# Patient Record
Sex: MALE | Race: BLACK OR AFRICAN AMERICAN | NOT HISPANIC OR LATINO | Employment: OTHER | URBAN - METROPOLITAN AREA
[De-identification: names, ages, dates, MRNs, and addresses within clinical notes are randomized per-mention and may not be internally consistent; named-entity substitution may affect disease eponyms.]

---

## 2020-12-10 DIAGNOSIS — R22.1 LOCALIZED SWELLING, MASS AND LUMP, NECK: Primary | ICD-10-CM

## 2020-12-10 DIAGNOSIS — R59.9 ENLARGED LYMPH NODES, UNSPECIFIED: ICD-10-CM

## 2021-12-21 ENCOUNTER — HOSPITAL ENCOUNTER (OUTPATIENT)
Dept: RADIOLOGY | Facility: OTHER | Age: 81
Discharge: HOME | End: 2021-12-21
Attending: FAMILY MEDICINE
Payer: MEDICARE

## 2021-12-21 DIAGNOSIS — G44.52 NEW DAILY PERSISTENT HEADACHE: Primary | ICD-10-CM

## 2021-12-21 DIAGNOSIS — G44.52 NEW DAILY PERSISTENT HEADACHE: ICD-10-CM

## 2021-12-21 PROCEDURE — 70450 CT HEAD/BRAIN W/O DYE: CPT | Mod: 26,,, | Performed by: RADIOLOGY

## 2021-12-21 PROCEDURE — 70450 CT HEAD WITHOUT CONTRAST: ICD-10-PCS | Mod: 26,,, | Performed by: RADIOLOGY

## 2021-12-21 PROCEDURE — 70450 CT HEAD/BRAIN W/O DYE: CPT | Mod: TC

## 2022-09-15 ENCOUNTER — HOSPITAL ENCOUNTER (EMERGENCY)
Facility: HOSPITAL | Age: 82
Discharge: HOME | End: 2022-09-15
Attending: EMERGENCY MEDICINE
Payer: MEDICARE

## 2022-09-15 VITALS
HEIGHT: 66 IN | OXYGEN SATURATION: 98 % | RESPIRATION RATE: 18 BRPM | DIASTOLIC BLOOD PRESSURE: 71 MMHG | SYSTOLIC BLOOD PRESSURE: 139 MMHG | HEART RATE: 84 BPM | WEIGHT: 196 LBS | BODY MASS INDEX: 31.5 KG/M2 | TEMPERATURE: 98 F

## 2022-09-15 DIAGNOSIS — R33.9 URINARY RETENTION: Primary | ICD-10-CM

## 2022-09-15 LAB
BILIRUB UR QL STRIP: NEGATIVE
CLARITY UR: CLEAR
COLOR UR: YELLOW
GLUCOSE UR QL STRIP: NEGATIVE
HGB UR QL STRIP: NEGATIVE
KETONES UR QL STRIP: NEGATIVE
LEUKOCYTE ESTERASE UR QL STRIP: NEGATIVE
NITRITE UR QL STRIP: NEGATIVE
PH UR STRIP: 6 [PH] (ref 5–8)
POCT GLUCOSE: 181 MG/DL (ref 70–110)
PROT UR QL STRIP: NEGATIVE
SP GR UR STRIP: 1.01 (ref 1–1.03)
URN SPEC COLLECT METH UR: NORMAL
UROBILINOGEN UR STRIP-ACNC: NEGATIVE EU/DL

## 2022-09-15 PROCEDURE — 99283 EMERGENCY DEPT VISIT LOW MDM: CPT | Mod: 25

## 2022-09-15 PROCEDURE — 81003 URINALYSIS AUTO W/O SCOPE: CPT | Performed by: EMERGENCY MEDICINE

## 2022-09-15 PROCEDURE — 82962 GLUCOSE BLOOD TEST: CPT

## 2022-09-15 PROCEDURE — 51702 INSERT TEMP BLADDER CATH: CPT

## 2022-09-15 RX ORDER — TAMSULOSIN HYDROCHLORIDE 0.4 MG/1
0.4 CAPSULE ORAL DAILY
Qty: 14 CAPSULE | Refills: 0 | Status: SHIPPED | OUTPATIENT
Start: 2022-09-15 | End: 2022-09-26 | Stop reason: SDUPTHER

## 2022-09-16 NOTE — ED NOTES
Pt discharged at this time after switching catheter to leg bag. Pt provided adequate return demonstration on emptying leg bag and placement. Pt discharged with son with follow-up referral to urology. Pt and son verbalized understanding.

## 2022-09-16 NOTE — ED PROVIDER NOTES
Encounter Date: 9/15/2022       History     Chief Complaint   Patient presents with    Urinary Frequency     Since last night started new medications on the 9th      82-year-old male history of hypertension diabetes presents with urinary frequency since yesterday.  He was recently admitted at [facility] for possible TIA.  Family reports history of at least 1 prior UTI.  Patient states about every 20 minutes he gets the sensation to urinate feels like he is not completely emptying his bladder.  He denies dysuria or abdominal pain.  Denies fevers chills nausea and vomiting.  To his knowledge he does not have any prostate issues.    The history is provided by the patient.   Review of patient's allergies indicates:  No Known Allergies  No past medical history on file.  No past surgical history on file.  No family history on file.     Review of Systems   Constitutional: Negative.    Gastrointestinal: Negative.    Genitourinary:  Positive for decreased urine volume, frequency and urgency.     Physical Exam     Initial Vitals [09/15/22 2116]   BP Pulse Resp Temp SpO2   139/71 84 18 97.7 °F (36.5 °C) 98 %      MAP       --         Physical Exam    Nursing note and vitals reviewed.  Constitutional: He appears well-developed and well-nourished. He is not diaphoretic.  Non-toxic appearance. He does not have a sickly appearance. He does not appear ill. No distress.   HENT:   Head: Normocephalic and atraumatic.   Eyes: EOM are normal.   Neck: Neck supple.   Normal range of motion.  Cardiovascular:  Normal rate, regular rhythm and normal heart sounds.     Exam reveals no gallop and no friction rub.       No murmur heard.  Pulmonary/Chest: Breath sounds normal. No respiratory distress. He has no wheezes. He has no rhonchi. He has no rales.   Abdominal: He exhibits distension.   Distended bladder   Musculoskeletal:         General: Normal range of motion.      Cervical back: Normal range of motion and neck supple. No rigidity.  Normal range of motion.     Neurological: He is alert and oriented to person, place, and time.   Skin: Skin is warm and dry. No rash noted.   Psychiatric: He has a normal mood and affect. His behavior is normal. Judgment and thought content normal.       ED Course   Procedures  Labs Reviewed   POCT GLUCOSE - Abnormal; Notable for the following components:       Result Value    POCT Glucose 181 (*)     All other components within normal limits   URINALYSIS, REFLEX TO URINE CULTURE    Narrative:     Specimen Source->Urine          Imaging Results    None          Medications - No data to display  Medical Decision Making:   History:   I obtained history from: someone other than patient.       <> Summary of History: Son-in-law  Old Medical Records: I decided to obtain old medical records.  Clinical Tests:   Lab Tests: Ordered and Reviewed             ED Course as of 09/15/22 2313   u Sep 15, 2022   2143 POCT Glucose(!): 181 [EF]   2143 BP: 139/71 [EF]   2143 Temp: 97.7 °F (36.5 °C) [EF]   2143 Temp src: Oral [EF]   2143 Pulse: 84 [EF]   2143 Resp: 18 [EF]   2143 SpO2: 98 % [EF]   2214 Leukocytes, UA: Negative [EF]   2214 NITRITE UA: Negative [EF]      ED Course User Index  [EF] Eric Ybarra MD                 Clinical Impression:   Final diagnoses:  [R33.9] Urinary retention (Primary)      ED Disposition Condition    Discharge Stable          ED Prescriptions       Medication Sig Dispense Start Date End Date Auth. Provider    tamsulosin (FLOMAX) 0.4 mg Cap Take 1 capsule (0.4 mg total) by mouth once daily. for 14 days 14 capsule 9/15/2022 9/29/2022 Eric Ybarra MD          Follow-up Information       Follow up With Specialties Details Why Contact Info    Victor M Campuzano MD Urology Schedule an appointment as soon as possible for a visit   [address] [place] [address] [address]  [place] [address]  [phone]      [facility] Emergency Dept Emergency Medicine  As needed, If symptoms worsen [address] [place] [address]  Drive  [facility] [address] [phone]            82-year-old male presents with urinary frequency hesitancy and a sensation of incomplete emptying.  Bladder scan demonstrates almost 1 L of urine has been retained.  Urinalysis is clear with no sign of infection.  Archer catheter will remain in place until Urology follow-up.  Patient will be started on Flomax.     Eric Ybarra MD  09/15/22 1814

## 2022-09-19 ENCOUNTER — OFFICE VISIT (OUTPATIENT)
Dept: FAMILY MEDICINE | Facility: CLINIC | Age: 82
End: 2022-09-19
Payer: MEDICARE

## 2022-09-19 VITALS
WEIGHT: 196.19 LBS | HEIGHT: 66 IN | BODY MASS INDEX: 31.53 KG/M2 | HEART RATE: 91 BPM | SYSTOLIC BLOOD PRESSURE: 98 MMHG | OXYGEN SATURATION: 98 % | TEMPERATURE: 99 F | DIASTOLIC BLOOD PRESSURE: 56 MMHG

## 2022-09-19 DIAGNOSIS — I95.9 HYPOTENSIVE EPISODE: ICD-10-CM

## 2022-09-19 DIAGNOSIS — R33.9 URINARY RETENTION: Primary | ICD-10-CM

## 2022-09-19 PROCEDURE — 99214 OFFICE O/P EST MOD 30 MIN: CPT | Mod: S$GLB,,, | Performed by: NURSE PRACTITIONER

## 2022-09-19 PROCEDURE — 99999 PR PBB SHADOW E&M-EST. PATIENT-LVL III: CPT | Mod: PBBFAC,,, | Performed by: NURSE PRACTITIONER

## 2022-09-19 PROCEDURE — 3074F SYST BP LT 130 MM HG: CPT | Mod: CPTII,S$GLB,, | Performed by: NURSE PRACTITIONER

## 2022-09-19 PROCEDURE — 1159F MED LIST DOCD IN RCRD: CPT | Mod: CPTII,S$GLB,, | Performed by: NURSE PRACTITIONER

## 2022-09-19 PROCEDURE — 99214 PR OFFICE/OUTPT VISIT, EST, LEVL IV, 30-39 MIN: ICD-10-PCS | Mod: S$GLB,,, | Performed by: NURSE PRACTITIONER

## 2022-09-19 PROCEDURE — 3074F PR MOST RECENT SYSTOLIC BLOOD PRESSURE < 130 MM HG: ICD-10-PCS | Mod: CPTII,S$GLB,, | Performed by: NURSE PRACTITIONER

## 2022-09-19 PROCEDURE — 3078F PR MOST RECENT DIASTOLIC BLOOD PRESSURE < 80 MM HG: ICD-10-PCS | Mod: CPTII,S$GLB,, | Performed by: NURSE PRACTITIONER

## 2022-09-19 PROCEDURE — 1126F PR PAIN SEVERITY QUANTIFIED, NO PAIN PRESENT: ICD-10-PCS | Mod: CPTII,S$GLB,, | Performed by: NURSE PRACTITIONER

## 2022-09-19 PROCEDURE — 3288F PR FALLS RISK ASSESSMENT DOCUMENTED: ICD-10-PCS | Mod: CPTII,S$GLB,, | Performed by: NURSE PRACTITIONER

## 2022-09-19 PROCEDURE — 3288F FALL RISK ASSESSMENT DOCD: CPT | Mod: CPTII,S$GLB,, | Performed by: NURSE PRACTITIONER

## 2022-09-19 PROCEDURE — 3078F DIAST BP <80 MM HG: CPT | Mod: CPTII,S$GLB,, | Performed by: NURSE PRACTITIONER

## 2022-09-19 PROCEDURE — 1126F AMNT PAIN NOTED NONE PRSNT: CPT | Mod: CPTII,S$GLB,, | Performed by: NURSE PRACTITIONER

## 2022-09-19 PROCEDURE — 1101F PR PT FALLS ASSESS DOC 0-1 FALLS W/OUT INJ PAST YR: ICD-10-PCS | Mod: CPTII,S$GLB,, | Performed by: NURSE PRACTITIONER

## 2022-09-19 PROCEDURE — 1159F PR MEDICATION LIST DOCUMENTED IN MEDICAL RECORD: ICD-10-PCS | Mod: CPTII,S$GLB,, | Performed by: NURSE PRACTITIONER

## 2022-09-19 PROCEDURE — 1101F PT FALLS ASSESS-DOCD LE1/YR: CPT | Mod: CPTII,S$GLB,, | Performed by: NURSE PRACTITIONER

## 2022-09-19 PROCEDURE — 99999 PR PBB SHADOW E&M-EST. PATIENT-LVL III: ICD-10-PCS | Mod: PBBFAC,,, | Performed by: NURSE PRACTITIONER

## 2022-09-19 RX ORDER — TIZANIDINE 4 MG/1
4 TABLET ORAL 2 TIMES DAILY PRN
COMMUNITY
Start: 2022-09-16 | End: 2022-09-28 | Stop reason: ALTCHOICE

## 2022-09-19 RX ORDER — LISINOPRIL 20 MG/1
20 TABLET ORAL DAILY
Qty: 90 TABLET | Refills: 3 | Status: SHIPPED | OUTPATIENT
Start: 2022-09-19 | End: 2022-09-28 | Stop reason: ALTCHOICE

## 2022-09-19 RX ORDER — AMLODIPINE BESYLATE 10 MG/1
10 TABLET ORAL DAILY
COMMUNITY
Start: 2022-06-07 | End: 2022-09-28 | Stop reason: ALTCHOICE

## 2022-09-19 RX ORDER — BRIMONIDINE TARTRATE 1.5 MG/ML
1 SOLUTION/ DROPS OPHTHALMIC 3 TIMES DAILY
COMMUNITY
Start: 2022-06-08 | End: 2023-03-21 | Stop reason: SDUPTHER

## 2022-09-19 RX ORDER — METFORMIN HYDROCHLORIDE 1000 MG/1
2000 TABLET ORAL EVERY MORNING
COMMUNITY
Start: 2022-09-08 | End: 2023-01-26 | Stop reason: SDUPTHER

## 2022-09-19 RX ORDER — ATORVASTATIN CALCIUM 80 MG/1
80 TABLET, FILM COATED ORAL DAILY
COMMUNITY
Start: 2022-09-08 | End: 2022-10-25 | Stop reason: SDUPTHER

## 2022-09-19 RX ORDER — INSULIN DETEMIR 100 [IU]/ML
INJECTION, SOLUTION SUBCUTANEOUS
COMMUNITY
Start: 2022-07-12 | End: 2022-09-28 | Stop reason: SDUPTHER

## 2022-09-19 RX ORDER — PEN NEEDLE, DIABETIC 31 GX5/16"
NEEDLE, DISPOSABLE MISCELLANEOUS
COMMUNITY
Start: 2022-06-07 | End: 2023-10-11 | Stop reason: SDUPTHER

## 2022-09-19 RX ORDER — CALCIUM CITRATE/VITAMIN D3 200MG-6.25
TABLET ORAL 3 TIMES DAILY
COMMUNITY
Start: 2022-04-09 | End: 2023-06-16 | Stop reason: SDUPTHER

## 2022-09-19 RX ORDER — INSULIN ASPART 100 [IU]/ML
INJECTION, SOLUTION INTRAVENOUS; SUBCUTANEOUS
COMMUNITY
Start: 2022-09-16 | End: 2023-05-01 | Stop reason: SDUPTHER

## 2022-09-19 RX ORDER — CLOPIDOGREL BISULFATE 75 MG/1
75 TABLET ORAL DAILY
COMMUNITY
Start: 2022-09-08 | End: 2022-10-25 | Stop reason: SDUPTHER

## 2022-09-19 RX ORDER — BRIMONIDINE TARTRATE 2 MG/ML
1 SOLUTION/ DROPS OPHTHALMIC 2 TIMES DAILY
COMMUNITY
Start: 2022-04-05 | End: 2023-03-21 | Stop reason: SDUPTHER

## 2022-09-19 RX ORDER — TAMSULOSIN HYDROCHLORIDE 0.4 MG/1
0.4 CAPSULE ORAL
COMMUNITY
End: 2022-09-19 | Stop reason: SDUPTHER

## 2022-09-19 RX ORDER — LISINOPRIL 40 MG/1
40 TABLET ORAL DAILY
COMMUNITY
Start: 2022-09-16 | End: 2022-09-19 | Stop reason: DRUGHIGH

## 2022-09-19 RX ORDER — ASPIRIN 81 MG/1
81 TABLET ORAL DAILY
COMMUNITY

## 2022-09-19 RX ORDER — DORZOLAMIDE HYDROCHLORIDE AND TIMOLOL MALEATE 20; 5 MG/ML; MG/ML
1 SOLUTION/ DROPS OPHTHALMIC 2 TIMES DAILY
COMMUNITY
Start: 2022-06-07 | End: 2023-03-21 | Stop reason: SDUPTHER

## 2022-09-19 RX ORDER — GABAPENTIN 300 MG/1
300 CAPSULE ORAL NIGHTLY
COMMUNITY
Start: 2022-09-16

## 2022-09-19 RX ORDER — TRAVOPROST OPHTHALMIC SOLUTION 0.04 MG/ML
1 SOLUTION OPHTHALMIC NIGHTLY
COMMUNITY
Start: 2022-06-07 | End: 2023-03-21 | Stop reason: SDUPTHER

## 2022-09-25 NOTE — PROGRESS NOTES
[facility] Note  Staff: KIRSTEN Silver    PCP: CORINNE Still    Chief Complaint: ER F/UP-Urinary Retention    Subjective:        HPI: Evelio Pandya Sr. is a 82 y.o. male presents today for ER F/UP-NEW PATIENT.    ER Visit on 9/15/22:  History of present illness  This 82 y.o. presents today for complaint of follow-up from emergency department.  Patient had urinary retention and has an indwelling catheter.    Emergency room had recommended follow-up with Urology.  He is taking the Flomax as directed.  Denies any fever chills.  No blood in urine.  Patient denies any back pain.  ER removed almost one liter of urine from bladder.    He was recently admitted at [facility] for possible TIA.  Family reports history of at least 1 prior UTI.  Patient states about every 20 minutes he gets the sensation to urinate feels like he is not completely emptying his bladder.  He denies dysuria or abdominal pain.  Denies fevers chills nausea and vomiting.  To his knowledge he does not have any prostate issues.    Yesterday:  Pt went to ER due to blood in his urine with avila catheter.  Therefore ER removed catheter and prescribed pt antibiotics.    TODAY:  Pt was unable to urinate, wearing depends 24/7.  PVR scan of >229 mL at this time.  Pt states he is leaking all day long, unable to hold his urine.  Catheter is not in place, was removed yesterday in ER.  Pt is currently taking Flomax 0.4 mg daily from initial ER visit.     REVIEW OF SYSTEMS:  A comprehensive 10 system review was performed and is negative except as noted above in HPI    PMHx:  Past Medical History:   Diagnosis Date    Diabetes mellitus     Hypertension     TIA (transient ischemic attack)      PSHx:  History reviewed. No pertinent surgical history.    Allergies:  Patient has no known allergies.    Medications: reviewed     Objective:     Vitals:    09/26/22 0813   BP: 125/70   Pulse: 83     General:WDWN in NAD  Eyes: PERRLA, normal  conjunctiva  Respiratory: no increased work on breathing, clear to auscultation  Cardiovascular: regular rate and rhythm. No obvious extremity edema.  GI: palpation of masses. No tenderness. No hepatosplenomegaly to palpation.  Musculoskeletal: normal range of motion of bilateral upper extremities. Normal muscle strength and tone.  Skin: no obvious rashes or lesions. No tightening of skin noted.  Neurologic: CN grossly normal. Normal sensation.   Psychiatric: awake, alert and oriented x 3. Mood and affect normal. Cooperative.     Assessment:       1. Benign prostatic hyperplasia with incomplete bladder emptying    2. Urinary retention          Plan:   BPH, hx recent TIA--Urinary Retention:  Pt was encouraged to drink plenty of fluids throughout the day today.  As well as bladder training every 2 hrs today in order to attempt to emptying bladder.    2.  Pt should increase Tamsulosin 0.4 to 0.8 mg daily at this time.    F/u with me in am to give UA and PVR recheck.  Pt and family verbalized understanding at this time.    Natali East, MARYP-C

## 2022-09-26 ENCOUNTER — HOSPITAL ENCOUNTER (EMERGENCY)
Facility: HOSPITAL | Age: 82
Discharge: HOME | End: 2022-09-26
Attending: EMERGENCY MEDICINE
Payer: MEDICARE

## 2022-09-26 ENCOUNTER — OFFICE VISIT (OUTPATIENT)
Dept: UROLOGY | Facility: CLINIC | Age: 82
End: 2022-09-26
Payer: MEDICARE

## 2022-09-26 VITALS
WEIGHT: 198 LBS | DIASTOLIC BLOOD PRESSURE: 70 MMHG | BODY MASS INDEX: 31.82 KG/M2 | HEART RATE: 83 BPM | SYSTOLIC BLOOD PRESSURE: 125 MMHG | HEIGHT: 66 IN

## 2022-09-26 VITALS
HEART RATE: 89 BPM | HEIGHT: 66 IN | WEIGHT: 198 LBS | BODY MASS INDEX: 31.82 KG/M2 | RESPIRATION RATE: 18 BRPM | SYSTOLIC BLOOD PRESSURE: 125 MMHG | TEMPERATURE: 99 F | DIASTOLIC BLOOD PRESSURE: 86 MMHG | OXYGEN SATURATION: 99 %

## 2022-09-26 DIAGNOSIS — R33.9 URINARY RETENTION: ICD-10-CM

## 2022-09-26 DIAGNOSIS — R39.14 BENIGN PROSTATIC HYPERPLASIA WITH INCOMPLETE BLADDER EMPTYING: Primary | ICD-10-CM

## 2022-09-26 DIAGNOSIS — R31.0 GROSS HEMATURIA: ICD-10-CM

## 2022-09-26 DIAGNOSIS — N40.1 BENIGN PROSTATIC HYPERPLASIA WITH INCOMPLETE BLADDER EMPTYING: Primary | ICD-10-CM

## 2022-09-26 DIAGNOSIS — T83.511A URINARY TRACT INFECTION ASSOCIATED WITH INDWELLING URETHRAL CATHETER, INITIAL ENCOUNTER: Primary | ICD-10-CM

## 2022-09-26 DIAGNOSIS — N39.0 URINARY TRACT INFECTION ASSOCIATED WITH INDWELLING URETHRAL CATHETER, INITIAL ENCOUNTER: Primary | ICD-10-CM

## 2022-09-26 LAB
BACTERIA #/AREA URNS HPF: ABNORMAL /HPF
BILIRUB UR QL STRIP: ABNORMAL
CLARITY UR: ABNORMAL
COLOR UR: ABNORMAL
GLUCOSE UR QL STRIP: ABNORMAL
HCV AB SERPL QL IA: NORMAL
HGB UR QL STRIP: ABNORMAL
HIV 1+2 AB+HIV1 P24 AG SERPL QL IA: NORMAL
HYALINE CASTS #/AREA URNS LPF: 0 /LPF
KETONES UR QL STRIP: ABNORMAL
LEUKOCYTE ESTERASE UR QL STRIP: ABNORMAL
MICROSCOPIC COMMENT: ABNORMAL
NITRITE UR QL STRIP: POSITIVE
PH UR STRIP: 6 [PH] (ref 5–8)
POC RESIDUAL URINE VOLUME: 229 ML (ref 0–100)
PROT UR QL STRIP: ABNORMAL
RBC #/AREA URNS HPF: >100 /HPF (ref 0–4)
SP GR UR STRIP: 1.02 (ref 1–1.03)
URN SPEC COLLECT METH UR: ABNORMAL
UROBILINOGEN UR STRIP-ACNC: ABNORMAL EU/DL
WBC #/AREA URNS HPF: 15 /HPF (ref 0–5)

## 2022-09-26 PROCEDURE — 86803 HEPATITIS C AB TEST: CPT | Performed by: EMERGENCY MEDICINE

## 2022-09-26 PROCEDURE — 99214 OFFICE O/P EST MOD 30 MIN: CPT | Mod: S$GLB,,, | Performed by: NURSE PRACTITIONER

## 2022-09-26 PROCEDURE — 1160F PR REVIEW ALL MEDS BY PRESCRIBER/CLIN PHARMACIST DOCUMENTED: ICD-10-PCS | Mod: CPTII,S$GLB,, | Performed by: NURSE PRACTITIONER

## 2022-09-26 PROCEDURE — 99999 PR PBB SHADOW E&M-EST. PATIENT-LVL V: ICD-10-PCS | Mod: PBBFAC,,, | Performed by: NURSE PRACTITIONER

## 2022-09-26 PROCEDURE — 63600175 PHARM REV CODE 636 W HCPCS: Performed by: EMERGENCY MEDICINE

## 2022-09-26 PROCEDURE — 51798 US URINE CAPACITY MEASURE: CPT | Mod: S$GLB,,, | Performed by: NURSE PRACTITIONER

## 2022-09-26 PROCEDURE — 36415 COLL VENOUS BLD VENIPUNCTURE: CPT | Performed by: EMERGENCY MEDICINE

## 2022-09-26 PROCEDURE — 87389 HIV-1 AG W/HIV-1&-2 AB AG IA: CPT | Performed by: EMERGENCY MEDICINE

## 2022-09-26 PROCEDURE — 51798 POCT BLADDER SCAN: ICD-10-PCS | Mod: S$GLB,,, | Performed by: NURSE PRACTITIONER

## 2022-09-26 PROCEDURE — 3074F PR MOST RECENT SYSTOLIC BLOOD PRESSURE < 130 MM HG: ICD-10-PCS | Mod: CPTII,S$GLB,, | Performed by: NURSE PRACTITIONER

## 2022-09-26 PROCEDURE — 96372 THER/PROPH/DIAG INJ SC/IM: CPT | Performed by: EMERGENCY MEDICINE

## 2022-09-26 PROCEDURE — 87077 CULTURE AEROBIC IDENTIFY: CPT | Performed by: EMERGENCY MEDICINE

## 2022-09-26 PROCEDURE — 87086 URINE CULTURE/COLONY COUNT: CPT | Performed by: EMERGENCY MEDICINE

## 2022-09-26 PROCEDURE — 3074F SYST BP LT 130 MM HG: CPT | Mod: CPTII,S$GLB,, | Performed by: NURSE PRACTITIONER

## 2022-09-26 PROCEDURE — 3078F PR MOST RECENT DIASTOLIC BLOOD PRESSURE < 80 MM HG: ICD-10-PCS | Mod: CPTII,S$GLB,, | Performed by: NURSE PRACTITIONER

## 2022-09-26 PROCEDURE — 3288F FALL RISK ASSESSMENT DOCD: CPT | Mod: CPTII,S$GLB,, | Performed by: NURSE PRACTITIONER

## 2022-09-26 PROCEDURE — 3288F PR FALLS RISK ASSESSMENT DOCUMENTED: ICD-10-PCS | Mod: CPTII,S$GLB,, | Performed by: NURSE PRACTITIONER

## 2022-09-26 PROCEDURE — 87186 SC STD MICRODIL/AGAR DIL: CPT | Performed by: EMERGENCY MEDICINE

## 2022-09-26 PROCEDURE — 1159F MED LIST DOCD IN RCRD: CPT | Mod: CPTII,S$GLB,, | Performed by: NURSE PRACTITIONER

## 2022-09-26 PROCEDURE — 99999 PR PBB SHADOW E&M-EST. PATIENT-LVL V: CPT | Mod: PBBFAC,,, | Performed by: NURSE PRACTITIONER

## 2022-09-26 PROCEDURE — 1159F PR MEDICATION LIST DOCUMENTED IN MEDICAL RECORD: ICD-10-PCS | Mod: CPTII,S$GLB,, | Performed by: NURSE PRACTITIONER

## 2022-09-26 PROCEDURE — 3078F DIAST BP <80 MM HG: CPT | Mod: CPTII,S$GLB,, | Performed by: NURSE PRACTITIONER

## 2022-09-26 PROCEDURE — 1125F AMNT PAIN NOTED PAIN PRSNT: CPT | Mod: CPTII,S$GLB,, | Performed by: NURSE PRACTITIONER

## 2022-09-26 PROCEDURE — 81000 URINALYSIS NONAUTO W/SCOPE: CPT | Performed by: EMERGENCY MEDICINE

## 2022-09-26 PROCEDURE — 1160F RVW MEDS BY RX/DR IN RCRD: CPT | Mod: CPTII,S$GLB,, | Performed by: NURSE PRACTITIONER

## 2022-09-26 PROCEDURE — 99214 PR OFFICE/OUTPT VISIT, EST, LEVL IV, 30-39 MIN: ICD-10-PCS | Mod: S$GLB,,, | Performed by: NURSE PRACTITIONER

## 2022-09-26 PROCEDURE — 99284 EMERGENCY DEPT VISIT MOD MDM: CPT

## 2022-09-26 PROCEDURE — 1125F PR PAIN SEVERITY QUANTIFIED, PAIN PRESENT: ICD-10-PCS | Mod: CPTII,S$GLB,, | Performed by: NURSE PRACTITIONER

## 2022-09-26 PROCEDURE — 87088 URINE BACTERIA CULTURE: CPT | Performed by: EMERGENCY MEDICINE

## 2022-09-26 PROCEDURE — 1101F PR PT FALLS ASSESS DOC 0-1 FALLS W/OUT INJ PAST YR: ICD-10-PCS | Mod: CPTII,S$GLB,, | Performed by: NURSE PRACTITIONER

## 2022-09-26 PROCEDURE — 1101F PT FALLS ASSESS-DOCD LE1/YR: CPT | Mod: CPTII,S$GLB,, | Performed by: NURSE PRACTITIONER

## 2022-09-26 RX ORDER — CEFTRIAXONE 1 G/1
1 INJECTION, POWDER, FOR SOLUTION INTRAMUSCULAR; INTRAVENOUS
Status: COMPLETED | OUTPATIENT
Start: 2022-09-26 | End: 2022-09-26

## 2022-09-26 RX ORDER — CEPHALEXIN 500 MG/1
500 CAPSULE ORAL 4 TIMES DAILY
Qty: 20 CAPSULE | Refills: 0 | Status: SHIPPED | OUTPATIENT
Start: 2022-09-26 | End: 2022-10-01

## 2022-09-26 RX ORDER — TAMSULOSIN HYDROCHLORIDE 0.4 MG/1
0.8 CAPSULE ORAL DAILY
Qty: 180 CAPSULE | Refills: 3 | Status: SHIPPED | OUTPATIENT
Start: 2022-09-26 | End: 2023-04-11 | Stop reason: SDUPTHER

## 2022-09-26 RX ORDER — ACETAMINOPHEN 500 MG/1
500 TABLET, FILM COATED ORAL 4 TIMES DAILY PRN
COMMUNITY
Start: 2022-09-16 | End: 2023-01-26 | Stop reason: ALTCHOICE

## 2022-09-26 RX ADMIN — CEFTRIAXONE SODIUM 1 G: 1 INJECTION, POWDER, FOR SOLUTION INTRAMUSCULAR; INTRAVENOUS at 02:09

## 2022-09-26 NOTE — DISCHARGE INSTRUCTIONS
Future Appointments   Date Time Provider Department Center   9/26/2022  8:00 AM BERTIN Correa 2C UROLOGY [place]

## 2022-09-26 NOTE — ED PROVIDER NOTES
Encounter Date: 9/26/2022       History     Chief Complaint   Patient presents with    Urinary Catheter problem     Seems to be leaking     HPI Patient is an 82-year-old man who presents emergency department for evaluation leaking around the Archer catheter and gross hematuria in his catheter bag.  He had a Archer catheter placed for urinary retention 2 weeks ago was scheduled to get it out on Friday but was left in over the weekend since they did not have a physician on staff Friday.  He has an appointment tomorrow with Urology to evaluate for catheter removal.  He denies any fever abdominal pain.  He has been having pain at the distal urethra from the catheter.  His wife states she has noticed a strong odor to to the urine today.  Review of patient's allergies indicates:  No Known Allergies  History reviewed. No pertinent past medical history.  History reviewed. No pertinent surgical history.  History reviewed. No pertinent family history.  Social History     Tobacco Use    Smoking status: Never    Smokeless tobacco: Never   Substance Use Topics    Alcohol use: Not Currently     Review of Systems   Constitutional:  Negative for fever.   HENT:  Negative for sore throat.    Respiratory:  Negative for shortness of breath.    Cardiovascular:  Negative for chest pain.   Gastrointestinal:  Negative for nausea.   Genitourinary:  Positive for difficulty urinating. Negative for dysuria.   Musculoskeletal:  Negative for back pain.   Skin:  Negative for rash.   Neurological:  Negative for weakness.   Hematological:  Does not bruise/bleed easily.     Physical Exam     Initial Vitals [09/26/22 0056]   BP Pulse Resp Temp SpO2   128/83 94 18 98.5 °F (36.9 °C) 99 %      MAP       --         Physical Exam    Nursing note and vitals reviewed.  Constitutional: He appears well-developed and well-nourished.   HENT:   Head: Normocephalic and atraumatic.   Eyes: EOM are normal. Pupils are equal, round, and reactive to light.   Neck: Neck  supple.   Pulmonary/Chest: No respiratory distress.   Abdominal: Abdomen is soft. He exhibits no distension. There is no abdominal tenderness.   Genitourinary:    Genitourinary Comments: Archer catheter in place with blood tinged urine.  No clots.  Strong odor to the urine noted.     Musculoskeletal:         General: Normal range of motion.      Cervical back: Neck supple.     Neurological: He is alert and oriented to person, place, and time.   Skin: Skin is warm and dry.       ED Course   Procedures  Labs Reviewed   URINALYSIS, REFLEX TO URINE CULTURE - Abnormal; Notable for the following components:       Result Value    Color, UA Brown (*)     Appearance, UA Hazy (*)     Protein, UA 2+ (*)     Glucose, UA 1+ (*)     Ketones, UA Trace (*)     Bilirubin (UA) 2+ (*)     Occult Blood UA 3+ (*)     Nitrite, UA Positive (*)     Urobilinogen, UA 2.0-3.0 (*)     Leukocytes, UA 1+ (*)     All other components within normal limits    Narrative:     Specimen Source->Urine   URINALYSIS MICROSCOPIC - Abnormal; Notable for the following components:    RBC, UA >100 (*)     WBC, UA 15 (*)     Bacteria Many (*)     All other components within normal limits    Narrative:     Specimen Source->Urine   CULTURE, URINE   HIV 1 / 2 ANTIBODY   HEPATITIS C ANTIBODY          Imaging Results    None          Medications   cefTRIAXone injection 1 g (1 g Intramuscular Given 9/26/22 0257)     Medical Decision Making:   History:   Old Medical Records: I decided to obtain old medical records.  Initial Assessment:   82-year-old man presents emergency department for evaluation of leaking around his Archer catheter and gross hematuria.  He has a history of urinary retention likely secondary to BPH and had Archer placed 2 weeks ago and was supposed to get it out last Friday but change to Monday.  Patient wishes to have his Archer removed.  Urinalysis reveals nitrite positive urine leukocytes and bacteria concerning for urinary tract infection.  This is  likely the cause of his hematuria.  Archer was removed and patient passed voiding trial being able to urinate on his own.  He is given Rocephin and will be started on Keflex while awaiting urine culture.  He has follow-up with Urology this morning.  Return precautions discussed.  Discharged in no acute distress.                        Clinical Impression:   Final diagnoses:  [T83.511A, N39.0] Urinary tract infection associated with indwelling urethral catheter, initial encounter (Primary)  [R31.0] Gross hematuria      ED Disposition Condition    Discharge Stable          ED Prescriptions       Medication Sig Dispense Start Date End Date Auth. Provider    cephALEXin (KEFLEX) 500 MG capsule Take 1 capsule (500 mg total) by mouth 4 (four) times daily. for 5 days 20 capsule 9/26/2022 10/1/2022 Rom Milan MD          Follow-up Information       Follow up With Specialties Details Why Contact [facility] Emergency Dept Emergency Medicine  As needed, If symptoms worsen [address] [place] [id number] [phone]             Rom Milan MD  09/26/22 1649

## 2022-09-26 NOTE — ED TRIAGE NOTES
Evelio Pandya Sr. is here with urinary catheter problems, seems to be leaking around penis with blood in bag.

## 2022-09-27 ENCOUNTER — TELEPHONE (OUTPATIENT)
Dept: FAMILY MEDICINE | Facility: CLINIC | Age: 82
End: 2022-09-27
Payer: MEDICARE

## 2022-09-27 ENCOUNTER — OFFICE VISIT (OUTPATIENT)
Dept: UROLOGY | Facility: CLINIC | Age: 82
End: 2022-09-27
Payer: MEDICARE

## 2022-09-27 VITALS
SYSTOLIC BLOOD PRESSURE: 120 MMHG | BODY MASS INDEX: 31.5 KG/M2 | HEART RATE: 44 BPM | HEIGHT: 66 IN | WEIGHT: 196 LBS | DIASTOLIC BLOOD PRESSURE: 74 MMHG

## 2022-09-27 DIAGNOSIS — N40.1 BENIGN PROSTATIC HYPERPLASIA WITH INCOMPLETE BLADDER EMPTYING: ICD-10-CM

## 2022-09-27 DIAGNOSIS — R33.9 URINARY RETENTION: Primary | ICD-10-CM

## 2022-09-27 DIAGNOSIS — R39.14 BENIGN PROSTATIC HYPERPLASIA WITH INCOMPLETE BLADDER EMPTYING: ICD-10-CM

## 2022-09-27 LAB
BILIRUB SERPL-MCNC: ABNORMAL MG/DL
BLOOD URINE, POC: ABNORMAL
CLARITY, POC UA: CLEAR
COLOR, POC UA: YELLOW
GLUCOSE UR QL STRIP: ABNORMAL
KETONES UR QL STRIP: ABNORMAL
LEUKOCYTE ESTERASE URINE, POC: ABNORMAL
NITRITE, POC UA: ABNORMAL
PH, POC UA: 5.5
POC RESIDUAL URINE VOLUME: 13 ML (ref 0–100)
PROTEIN, POC: ABNORMAL
SPECIFIC GRAVITY, POC UA: 1.01
UROBILINOGEN, POC UA: ABNORMAL

## 2022-09-27 PROCEDURE — 81002 POCT URINE DIPSTICK WITHOUT MICROSCOPE: ICD-10-PCS | Mod: S$GLB,,, | Performed by: NURSE PRACTITIONER

## 2022-09-27 PROCEDURE — 3074F PR MOST RECENT SYSTOLIC BLOOD PRESSURE < 130 MM HG: ICD-10-PCS | Mod: CPTII,S$GLB,, | Performed by: NURSE PRACTITIONER

## 2022-09-27 PROCEDURE — 1160F RVW MEDS BY RX/DR IN RCRD: CPT | Mod: CPTII,S$GLB,, | Performed by: NURSE PRACTITIONER

## 2022-09-27 PROCEDURE — 1159F PR MEDICATION LIST DOCUMENTED IN MEDICAL RECORD: ICD-10-PCS | Mod: CPTII,S$GLB,, | Performed by: NURSE PRACTITIONER

## 2022-09-27 PROCEDURE — 99999 PR PBB SHADOW E&M-EST. PATIENT-LVL IV: CPT | Mod: PBBFAC,,, | Performed by: NURSE PRACTITIONER

## 2022-09-27 PROCEDURE — 99213 PR OFFICE/OUTPT VISIT, EST, LEVL III, 20-29 MIN: ICD-10-PCS | Mod: S$GLB,,, | Performed by: NURSE PRACTITIONER

## 2022-09-27 PROCEDURE — 1159F MED LIST DOCD IN RCRD: CPT | Mod: CPTII,S$GLB,, | Performed by: NURSE PRACTITIONER

## 2022-09-27 PROCEDURE — 51798 US URINE CAPACITY MEASURE: CPT | Mod: S$GLB,,, | Performed by: NURSE PRACTITIONER

## 2022-09-27 PROCEDURE — 51798 POCT BLADDER SCAN: ICD-10-PCS | Mod: S$GLB,,, | Performed by: NURSE PRACTITIONER

## 2022-09-27 PROCEDURE — 3074F SYST BP LT 130 MM HG: CPT | Mod: CPTII,S$GLB,, | Performed by: NURSE PRACTITIONER

## 2022-09-27 PROCEDURE — 87086 URINE CULTURE/COLONY COUNT: CPT | Performed by: NURSE PRACTITIONER

## 2022-09-27 PROCEDURE — 3078F PR MOST RECENT DIASTOLIC BLOOD PRESSURE < 80 MM HG: ICD-10-PCS | Mod: CPTII,S$GLB,, | Performed by: NURSE PRACTITIONER

## 2022-09-27 PROCEDURE — 99213 OFFICE O/P EST LOW 20 MIN: CPT | Mod: S$GLB,,, | Performed by: NURSE PRACTITIONER

## 2022-09-27 PROCEDURE — 3078F DIAST BP <80 MM HG: CPT | Mod: CPTII,S$GLB,, | Performed by: NURSE PRACTITIONER

## 2022-09-27 PROCEDURE — 81002 URINALYSIS NONAUTO W/O SCOPE: CPT | Mod: S$GLB,,, | Performed by: NURSE PRACTITIONER

## 2022-09-27 PROCEDURE — 1160F PR REVIEW ALL MEDS BY PRESCRIBER/CLIN PHARMACIST DOCUMENTED: ICD-10-PCS | Mod: CPTII,S$GLB,, | Performed by: NURSE PRACTITIONER

## 2022-09-27 PROCEDURE — 99999 PR PBB SHADOW E&M-EST. PATIENT-LVL IV: ICD-10-PCS | Mod: PBBFAC,,, | Performed by: NURSE PRACTITIONER

## 2022-09-27 NOTE — TELEPHONE ENCOUNTER
----- Message from Dhara Nguyễn sent at 9/27/2022 10:09 AM CDT -----  Regarding: pt call back  Name of Who is Calling: LUIS WALLACE SR. [[id number]] [place] (daughter)       What is the request in detail: Pt's daughter is requesting for her father to be seen today due to having low blood pressure. He does have an appt tomorrow 09/28/2022 but she would like for him to be seen sooner. Please advise        Can the clinic reply by [facility]: NO        [facility]: [phone]

## 2022-09-27 NOTE — TELEPHONE ENCOUNTER
Spoke to patient's daughter via phone. Advised we do not have any earlier appointment. Advised to go to emergency room if pressure or pulse drops.

## 2022-09-27 NOTE — PROGRESS NOTES
[facility] Note  Staff: KIRSTEN Silver    PCP: CORINNE Still    Chief Complaint: F/UP-Urinary Retention    Subjective:        HPI: Evelio Pandya Sr. is a 82 y.o. male presents today in office for routine recheck.  Last ov yesterday we increased pt's flomax to 0.8 mg daily and we want to re-scan bladder this am.    Pt was initially evaluated by me yesterday as a hospital f/up regarding urinary retention issues.    OV on 9/26/22:  Pt was unable to urinate, wearing depends 24/7.  PVR scan of >229 mL at this time.  Pt states he is leaking all day long, unable to hold his urine.  Catheter is not in place, was removed yesterday in ER.  Pt is currently taking Flomax 0.4 mg daily from initial ER visit.    ER Visit on 9/15/22:  History of present illness  This 82 y.o. presents today for complaint of follow-up from emergency department.  Patient had urinary retention and has an indwelling catheter.    Emergency room had recommended follow-up with Urology.  He is taking the Flomax as directed.  Denies any fever chills.  No blood in urine.  Patient denies any back pain.  ER removed almost one liter of urine from bladder.     He was recently admitted at [facility] for possible TIA.  Family reports history of at least 1 prior UTI.  Patient states about every 20 minutes he gets the sensation to urinate feels like he is not completely emptying his bladder.  He denies dysuria or abdominal pain.  Denies fevers chills nausea and vomiting.  To his knowledge he does not have any prostate issues.     TODAY:  UA in office today was abnormal at this time.  PVR by bladder scan shows 13 mL  Pt currently taking daily Flomax 0.8 mg daily at this time.  No gross hematuria, No dysuria.      REVIEW OF SYSTEMS:  A comprehensive 10 system review was performed and is negative except as noted above in HPI    PMHx:  Past Medical History:   Diagnosis Date    Diabetes mellitus     Hypertension     TIA (transient ischemic  attack)     Urinary retention      PSHx:  History reviewed. No pertinent surgical history.    Allergies:  Patient has no known allergies.    Medications: reviewed   Objective:     Vitals:    09/27/22 0920   BP: 120/74   Pulse: (!) 44       General:WDWN in NAD  Eyes: PERRLA, normal conjunctiva  Respiratory: no increased work on breathing, clear to auscultation  Cardiovascular: regular rate and rhythm. No obvious extremity edema.  GI: palpation of masses. No tenderness. No hepatosplenomegaly to palpation.  Musculoskeletal: normal range of motion of bilateral upper extremities. Normal muscle strength and tone.  Skin: no obvious rashes or lesions. No tightening of skin noted.  Neurologic: CN grossly normal. Normal sensation.   Psychiatric: awake, alert and oriented x 3. Mood and affect normal. Cooperative.      Assessment:       1. Urinary retention    2. Benign prostatic hyperplasia with incomplete bladder emptying            Plan:     Urine culture to be processed today.  Continue Flomax 0.8 mg daily at this time, may need to decrease if HR stays below 60 bpm.    Discussed conservative measures to control urgency and frequency including avoiding bladder irritants, timed voiding, not postponing voiding, and bowel regimen (as distended bowel has extrinsic compressive effect on bladder. Discussed bladder irritants include coffe (even decaf), tea, alcohol, soda, spicy foods, acidic juices (orange, tomato), vinegar, and artificial sweeteners.     F/UP in one month for urine and PVR recheck.    Natali East, RENATEC

## 2022-09-28 ENCOUNTER — OFFICE VISIT (OUTPATIENT)
Dept: FAMILY MEDICINE | Facility: CLINIC | Age: 82
End: 2022-09-28
Payer: MEDICARE

## 2022-09-28 VITALS
HEART RATE: 77 BPM | BODY MASS INDEX: 31.64 KG/M2 | WEIGHT: 196.88 LBS | DIASTOLIC BLOOD PRESSURE: 58 MMHG | HEIGHT: 66 IN | SYSTOLIC BLOOD PRESSURE: 94 MMHG | TEMPERATURE: 98 F | OXYGEN SATURATION: 98 %

## 2022-09-28 DIAGNOSIS — H40.1133 PRIMARY OPEN ANGLE GLAUCOMA (POAG) OF BOTH EYES, SEVERE STAGE: ICD-10-CM

## 2022-09-28 DIAGNOSIS — Z79.4 TYPE 2 DIABETES MELLITUS WITH HYPERGLYCEMIA, WITH LONG-TERM CURRENT USE OF INSULIN: ICD-10-CM

## 2022-09-28 DIAGNOSIS — E11.65 TYPE 2 DIABETES MELLITUS WITH HYPERGLYCEMIA, WITH LONG-TERM CURRENT USE OF INSULIN: ICD-10-CM

## 2022-09-28 DIAGNOSIS — Z23 NEED FOR INFLUENZA VACCINATION: ICD-10-CM

## 2022-09-28 PROBLEM — N39.0 URINARY TRACT INFECTION: Status: ACTIVE | Noted: 2019-11-15

## 2022-09-28 PROBLEM — I10 HTN (HYPERTENSION): Status: ACTIVE | Noted: 2019-11-12

## 2022-09-28 PROBLEM — N40.0 BPH (BENIGN PROSTATIC HYPERPLASIA): Status: ACTIVE | Noted: 2022-09-06

## 2022-09-28 LAB — BACTERIA UR CULT: ABNORMAL

## 2022-09-28 PROCEDURE — 90686 IIV4 VACC NO PRSV 0.5 ML IM: CPT | Mod: S$GLB,,, | Performed by: FAMILY MEDICINE

## 2022-09-28 PROCEDURE — G0008 FLU VACCINE (QUAD) GREATER THAN OR EQUAL TO 3YO PRESERVATIVE FREE IM: ICD-10-PCS | Mod: S$GLB,,, | Performed by: FAMILY MEDICINE

## 2022-09-28 PROCEDURE — 1159F PR MEDICATION LIST DOCUMENTED IN MEDICAL RECORD: ICD-10-PCS | Mod: CPTII,S$GLB,, | Performed by: FAMILY MEDICINE

## 2022-09-28 PROCEDURE — 1159F MED LIST DOCD IN RCRD: CPT | Mod: CPTII,S$GLB,, | Performed by: FAMILY MEDICINE

## 2022-09-28 PROCEDURE — G0008 ADMIN INFLUENZA VIRUS VAC: HCPCS | Mod: S$GLB,,, | Performed by: FAMILY MEDICINE

## 2022-09-28 PROCEDURE — 1101F PT FALLS ASSESS-DOCD LE1/YR: CPT | Mod: CPTII,S$GLB,, | Performed by: FAMILY MEDICINE

## 2022-09-28 PROCEDURE — 1126F PR PAIN SEVERITY QUANTIFIED, NO PAIN PRESENT: ICD-10-PCS | Mod: CPTII,S$GLB,, | Performed by: FAMILY MEDICINE

## 2022-09-28 PROCEDURE — 99999 PR PBB SHADOW E&M-EST. PATIENT-LVL IV: ICD-10-PCS | Mod: PBBFAC,,, | Performed by: FAMILY MEDICINE

## 2022-09-28 PROCEDURE — 99214 OFFICE O/P EST MOD 30 MIN: CPT | Mod: 25,S$GLB,, | Performed by: FAMILY MEDICINE

## 2022-09-28 PROCEDURE — 3288F FALL RISK ASSESSMENT DOCD: CPT | Mod: CPTII,S$GLB,, | Performed by: FAMILY MEDICINE

## 2022-09-28 PROCEDURE — 3074F PR MOST RECENT SYSTOLIC BLOOD PRESSURE < 130 MM HG: ICD-10-PCS | Mod: CPTII,S$GLB,, | Performed by: FAMILY MEDICINE

## 2022-09-28 PROCEDURE — 1126F AMNT PAIN NOTED NONE PRSNT: CPT | Mod: CPTII,S$GLB,, | Performed by: FAMILY MEDICINE

## 2022-09-28 PROCEDURE — 90686 FLU VACCINE (QUAD) GREATER THAN OR EQUAL TO 3YO PRESERVATIVE FREE IM: ICD-10-PCS | Mod: S$GLB,,, | Performed by: FAMILY MEDICINE

## 2022-09-28 PROCEDURE — 3288F PR FALLS RISK ASSESSMENT DOCUMENTED: ICD-10-PCS | Mod: CPTII,S$GLB,, | Performed by: FAMILY MEDICINE

## 2022-09-28 PROCEDURE — 3078F PR MOST RECENT DIASTOLIC BLOOD PRESSURE < 80 MM HG: ICD-10-PCS | Mod: CPTII,S$GLB,, | Performed by: FAMILY MEDICINE

## 2022-09-28 PROCEDURE — 1101F PR PT FALLS ASSESS DOC 0-1 FALLS W/OUT INJ PAST YR: ICD-10-PCS | Mod: CPTII,S$GLB,, | Performed by: FAMILY MEDICINE

## 2022-09-28 PROCEDURE — 3074F SYST BP LT 130 MM HG: CPT | Mod: CPTII,S$GLB,, | Performed by: FAMILY MEDICINE

## 2022-09-28 PROCEDURE — 1160F RVW MEDS BY RX/DR IN RCRD: CPT | Mod: CPTII,S$GLB,, | Performed by: FAMILY MEDICINE

## 2022-09-28 PROCEDURE — 1160F PR REVIEW ALL MEDS BY PRESCRIBER/CLIN PHARMACIST DOCUMENTED: ICD-10-PCS | Mod: CPTII,S$GLB,, | Performed by: FAMILY MEDICINE

## 2022-09-28 PROCEDURE — 99214 PR OFFICE/OUTPT VISIT, EST, LEVL IV, 30-39 MIN: ICD-10-PCS | Mod: 25,S$GLB,, | Performed by: FAMILY MEDICINE

## 2022-09-28 PROCEDURE — 99999 PR PBB SHADOW E&M-EST. PATIENT-LVL IV: CPT | Mod: PBBFAC,,, | Performed by: FAMILY MEDICINE

## 2022-09-28 PROCEDURE — 3078F DIAST BP <80 MM HG: CPT | Mod: CPTII,S$GLB,, | Performed by: FAMILY MEDICINE

## 2022-09-28 RX ORDER — INSULIN DETEMIR 100 [IU]/ML
20 INJECTION, SOLUTION SUBCUTANEOUS NIGHTLY
Qty: 3 EACH | Refills: 11
Start: 2022-09-28 | End: 2023-04-11

## 2022-09-28 NOTE — PROGRESS NOTES
[facility]     Subjective:    Chief Complaint:   Chief Complaint   Patient presents with    Low Blood pressure       History of Present Illness:  82 y.o. male presents for multiple issues.   Diabetes type 2 more than 10 years, diabetic retinopathy and diabetic nephropathy and polyneuropathy present.  No glucose testing home.  Lab Results   Component Value Date    HGBA1C 7.7 (H) 09/07/2022   He is due for foot examination, I sent i ophthalmology appointment to to history of glaucoma, a micro-albumin test is pending.  He has poor compliance with diet.  Severe planus of the right eye.  Poor alertness, short-term memory.  He is able to complete complex task.  Interview handle also with the presence by phone of his daughter.  Lengthy discussion regarding living will/advance directives    He has moderate low blood pressure today with a thickness dizziness.  He denies syncope nor  orthostatic, no delirium, melena, syncope, rashes, or weight loss      I reviewed the patients chart dating back for the past few appointments. See above.    The following portions of the patient's history were reviewed and updated as appropriate: allergies, current medications, past family history, past medical history, past social history, past surgical history and problem list.    He denies chest pain upon exertion, dyspnea, nausea, vomiting, diaphoresis, and syncope. No pleuritic chest pain, unilateral leg swelling, calf tenderness, or calf pain.      Past Medical History:   Diagnosis Date    Diabetes mellitus     Hypertension     TIA (transient ischemic attack)     Urinary retention        History reviewed. No pertinent surgical history.    Social History  Social History     Tobacco Use    Smoking status: Never    Smokeless tobacco: Never   Substance Use Topics    Alcohol use: Not Currently       No family history on file.  Review of patient's allergies indicates:  No Known Allergies    Review of Systems [Negative unless checked  "off]    General ROS: []fever, []chills, []weight loss, [x]malaise/fatigue.  ENT ROS: []congestion, []rhinorrhea,  []sore throat, []neck pain, [x]hearing loss.  Ophthalmic ROS:[x]blurry vision, [] double vision, []photophobia, []eye pain.  Respiratory ROS: []cough, []pleuritic chest pain, []shortness of breath, []wheezing.  CVS ROS:[]chest pain, []dyspnea on exertion, []palpitations, []orthopnea, []leg swelling, []PND.   GI ROS: []nausea, []vomiting, [] epigastric pain, []abd pain, []diarrhea, []constipation, []blood/melena in stool.   Urology ROS:[]dysuria, []frequency, []flank pain,[] trouble voiding, [] hematuria.   MSK ROS: []myalgias, [x]joint pain, []muscular weakness,  []back pain, [] falls.   Derm ROS: []pruritis, []rash, []jaundice.  Neurological:[x]dizziness,[x]numbness,[]loss of consciousness, []weakness  []headaches.   Psych ROS: []hallucinations, []depression, []anxiety, []suicidal ideation.    Physical Examination  BP (!) 94/58 (BP Location: Right arm, Patient Position: Sitting, BP Method: Medium (Manual))   Pulse 77   Temp 97.6 °F (36.4 °C)   Ht 5' 6" (1.676 m)   Wt 89.3 kg (196 lb 13.9 oz)   SpO2 98%   BMI 31.78 kg/m²   Wt Readings from Last 3 Encounters:   09/28/22 89.3 kg (196 lb 13.9 oz)   09/27/22 88.9 kg (195 lb 15.8 oz)   09/26/22 89.8 kg (198 lb)     BP Readings from Last 3 Encounters:   09/28/22 (!) 94/58   09/27/22 120/74   09/26/22 125/70     Estimated body mass index is 31.78 kg/m² as calculated from the following:    Height as of this encounter: 5' 6" (1.676 m).    Weight as of this encounter: 89.3 kg (196 lb 13.9 oz).     General appearance: alert, cooperative, no distress, BMI 31  Eyes: left eye normal, cataracts noted, right eye blindness, left eye exam able to see fingers   Ears:  Moderate hearing loss   Nose: normal and patent, no erythema, discharge or polyps   Sinuses: Normal paranasal sinuses without tenderness   Throat: mucous membranes moist, pharynx normal without lesions "   Neck: no thyromegaly, trachea midline  Lungs: clear to auscultation, no wheezes, rales or rhonchi, symmetric air entry, no dullness to percussion bilaterally.  Heart: normal rate, regular rhythm, normal S1, S2, no murmurs, rubs, clicks or gallops, no displacement of the PMI.  Abdomen: soft, nontender, nondistended, no masses or organomegaly No rigidity, rebound, or guarding.   Back: full range of motion, no tenderness, palpable spasm or pain on motion   Extremities: peripheral pulses normal, no pedal edema, no clubbing or cyanosis   Feet: warm, good capillary refill.  Neurological:alert, oriented, normal speech, no focal findings or movement disorder noted, screening mental status exam normal, neck supple without rigidity, cranial nerves II through XII intact   Psychiatric: alert, oriented to person, place, and time, depressed mood, confused  Integument:  No rashes normal coloration and turgor, no rashes, no suspicious skin lesions noted.    Data reviewed    Previous medical records reviewed and summarized above in HPI. 274}    Laboratory    I have reviewed old labs below:   { Click here to jump to lab section :[id number]}274}  Lab Results   Component Value Date    HGBA1C 7.7 (H) 09/07/2022       Imaging/Tracing: I have reviewed the pertinent results/findings and my personal findings are below:  274}    Assessment/Plan     274}    Evelio Pandya is a 82 y.o. male who presents to clinic with:    1. DM (diabetes mellitus), type 2, uncontrolled with complications    2. Type 2 diabetes mellitus with hyperglycemia, with long-term current use of insulin    3. Primary open angle glaucoma (POAG) of both eyes, severe stage         Diagnostic impression remarks       1. DM (diabetes mellitus), type 2, uncontrolled with complications  - LEVEMIR FLEXTOUCH U-100 INSULN 100 unit/mL (3 mL) InPn pen; Inject 20 Units into the skin every evening.  Dispense: 3 each; Refill: 11  - Lipid panel; Future  - Comprehensive metabolic panel;  "Future  - Hemoglobin A1c; Future  - Microalbumin/creatinine urine ratio; Future  - Ambulatory referral/consult to Ophthalmology; Future  - Ambulatory referral/consult to Outpatient Case Management  - TSH; Future    2. Type 2 diabetes mellitus with hyperglycemia, with long-term current use of insulin    3. Primary open angle glaucoma (POAG) of both eyes, severe stage      Medication Monitoring: In today's visit, monitoring for drug toxicity was accomplished. Proper use of medications was discussed.     Counseling: Counseling included discussion regarding imaging findings, diagnosis, possibilities, treatment options, medications, risks, and benefits. He had many questions regarding the options and long-term effects. All questions were answered. He expressed understanding after counseling regarding the diagnosis and recommendations. He was capable and demonstrated competence with understanding of these options. Shared decision making was performed resulting in him choosing the current treatment plan.     He was counseled about the importance of healthy dietary habits as well as routine physical activity and exercise for better health outcomes. I also discussed the importance of cancer screening.     I also discussed the importance of close follow up to discuss labs, change or modify his medications if needed, monitor side effects, and further evaluation of medical problems.     Additional workup planned: see labs ordered below.    See below for labs and meds ordered with associated diagnosis      Medication List with Changes/Refills   Current Medications    ASPIRIN (ECOTRIN) 81 MG EC TABLET    Take 81 mg by mouth once daily.    ATORVASTATIN (LIPITOR) 80 MG TABLET    Take 80 mg by mouth once daily.    BD ULTRA-FINE SHORT PEN NEEDLE 31 GAUGE X 5/16" NDLE    Inject into the skin.    BRIMONIDINE 0.15 % OPTH DROP (ALPHAGAN) 0.15 % OPHTHALMIC SOLUTION    Place 1 drop into both eyes 3 (three) times daily.    BRIMONIDINE 0.2% " (ALPHAGAN) 0.2 % DROP    Place 1 drop into both eyes 2 (two) times daily.    CEPHALEXIN (KEFLEX) 500 MG CAPSULE    Take 1 capsule (500 mg total) by mouth 4 (four) times daily. for 5 days    CLOPIDOGREL (PLAVIX) 75 MG TABLET    Take 75 mg by mouth once daily.    COSOPT 22.3-6.8 MG/ML OPHTHALMIC SOLUTION    Place 1 drop into both eyes 2 (two) times daily.    GABAPENTIN (NEURONTIN) 300 MG CAPSULE    Take 300 mg by mouth every evening.    METFORMIN (GLUCOPHAGE) 1000 MG TABLET    Take 2,000 mg by mouth every morning.    NOVOLOG FLEXPEN U-100 INSULIN 100 UNIT/ML (3 ML) INPN PEN    SMARTSI Unit(s) SUB-Q 3 Times Daily    TAMSULOSIN (FLOMAX) 0.4 MG CAP    Take 2 capsules (0.8 mg total) by mouth once daily.    TRAVOPROST (TRAVATAN Z) 0.004 % OPHTHALMIC SOLUTION    Place 1 drop into both eyes every evening.    TRUE METRIX GLUCOSE TEST STRIP STRP    3 (three) times daily. Use to test blood glucose    TYLENOL EXTRA STRENGTH 500 MG TABLET    Take 500 mg by mouth 4 (four) times daily as needed.   Changed and/or Refilled Medications    Modified Medication Previous Medication    LEVEMIR FLEXTOUCH U-100 INSULN 100 UNIT/ML (3 ML) INPN PEN LEVEMIR FLEXTOUCH U-100 INSULN 100 unit/mL (3 mL) InPn pen       Inject 20 Units into the skin every evening.    SMARTSI Unit(s) SUB-Q Twice Daily   Discontinued Medications    AMLODIPINE (NORVASC) 10 MG TABLET    Take 10 mg by mouth once daily.    LISINOPRIL (PRINIVIL,ZESTRIL) 20 MG TABLET    Take 1 tablet (20 mg total) by mouth once daily.    TIZANIDINE (ZANAFLEX) 4 MG TABLET    Take 4 mg by mouth 2 (two) times daily as needed.     Modified Medications    Modified Medication Previous Medication    LEVEMIR FLEXTOUCH U-100 INSULN 100 UNIT/ML (3 ML) INPN PEN LEVEMIR FLEXTOUCH U-100 INSULN 100 unit/mL (3 mL) InPn pen       Inject 20 Units into the skin every evening.    SMARTSI Unit(s) SUB-Q Twice Daily       Follow up in about 3 months (around 2022) for labs before next visit. for  "further workup and reassessment if labs and tests obtained are stable or sooner as needed. He was instructed to call the clinic or go to the emergency department if his symptoms do not improve, worsens, or if new symptoms develop. Patient knows to call any time if an emergency arises. Shared decision making occurred and he verbalized understanding in agreement with this plan.     Documentation entered by me for this encounter may have been done in part using speech-recognition technology. Although I have made an effort to ensure accuracy, "sound like" errors may exist and should be interpreted in context.       Jae Henley MD     Discussed health maintenance guidelines appropriate for age.    "

## 2022-09-29 LAB — BACTERIA UR CULT: NO GROWTH

## 2022-09-30 ENCOUNTER — PATIENT MESSAGE (OUTPATIENT)
Dept: FAMILY MEDICINE | Facility: CLINIC | Age: 82
End: 2022-09-30
Payer: MEDICARE

## 2022-10-25 RX ORDER — CLOPIDOGREL BISULFATE 75 MG/1
75 TABLET ORAL DAILY
Qty: 90 TABLET | Refills: 1 | Status: SHIPPED | OUTPATIENT
Start: 2022-10-25 | End: 2023-05-18

## 2022-10-25 RX ORDER — ATORVASTATIN CALCIUM 80 MG/1
80 TABLET, FILM COATED ORAL DAILY
Qty: 90 TABLET | Refills: 1 | Status: SHIPPED | OUTPATIENT
Start: 2022-10-25

## 2022-10-25 NOTE — TELEPHONE ENCOUNTER
----- Message from Patria Ramírez sent at 10/25/2022 10:56 AM CDT -----  Contact: call dauter at  [phone]  Type:  RX Refill Request    Who Called:  [place]   Refill or New Rx:  Refill  RX Name and Strength:  atorvastatin (LIPITOR) 80 MG tablet  clopidogreL (PLAVIX) 75 mg tablet  How is the patient currently taking it? (ex. 1XDay):  1XDay  Is this a 30 day or 90 day RX:  90  Preferred Pharmacy with phone number:    [id number] - [place], [place] - [address] [facility] [address]  [address] [facility]  [place] [address]  Phone: [phone] Fax: [phone]  Local or Mail Order:  Local  Ordering Provider:  Arnoldo Esquivel Call Back Number:  [phone]  Additional Information:  Pt needs a supply of meds atorvastatin (LIPITOR) 80 MG tablet  clopidogreL (PLAVIX) 75 mg tablet until appt on 11/11/22.    Pt is completely out.  Please call back to advise.

## 2022-11-07 ENCOUNTER — OFFICE VISIT (OUTPATIENT)
Dept: UROLOGY | Facility: CLINIC | Age: 82
End: 2022-11-07
Payer: MEDICARE

## 2022-11-07 VITALS
DIASTOLIC BLOOD PRESSURE: 67 MMHG | SYSTOLIC BLOOD PRESSURE: 97 MMHG | BODY MASS INDEX: 31.29 KG/M2 | HEIGHT: 66 IN | WEIGHT: 194.69 LBS | HEART RATE: 46 BPM

## 2022-11-07 DIAGNOSIS — R33.9 URINARY RETENTION: Primary | ICD-10-CM

## 2022-11-07 DIAGNOSIS — N40.1 BENIGN PROSTATIC HYPERPLASIA WITH INCOMPLETE BLADDER EMPTYING: ICD-10-CM

## 2022-11-07 DIAGNOSIS — R39.14 BENIGN PROSTATIC HYPERPLASIA WITH INCOMPLETE BLADDER EMPTYING: ICD-10-CM

## 2022-11-07 LAB — POC RESIDUAL URINE VOLUME: 101 ML (ref 0–100)

## 2022-11-07 PROCEDURE — 99999 PR PBB SHADOW E&M-EST. PATIENT-LVL IV: ICD-10-PCS | Mod: PBBFAC,,, | Performed by: NURSE PRACTITIONER

## 2022-11-07 PROCEDURE — 3078F PR MOST RECENT DIASTOLIC BLOOD PRESSURE < 80 MM HG: ICD-10-PCS | Mod: CPTII,S$GLB,, | Performed by: NURSE PRACTITIONER

## 2022-11-07 PROCEDURE — 99213 PR OFFICE/OUTPT VISIT, EST, LEVL III, 20-29 MIN: ICD-10-PCS | Mod: S$GLB,,, | Performed by: NURSE PRACTITIONER

## 2022-11-07 PROCEDURE — 3074F PR MOST RECENT SYSTOLIC BLOOD PRESSURE < 130 MM HG: ICD-10-PCS | Mod: CPTII,S$GLB,, | Performed by: NURSE PRACTITIONER

## 2022-11-07 PROCEDURE — 51798 US URINE CAPACITY MEASURE: CPT | Mod: S$GLB,,, | Performed by: NURSE PRACTITIONER

## 2022-11-07 PROCEDURE — 1159F MED LIST DOCD IN RCRD: CPT | Mod: CPTII,S$GLB,, | Performed by: NURSE PRACTITIONER

## 2022-11-07 PROCEDURE — 1160F PR REVIEW ALL MEDS BY PRESCRIBER/CLIN PHARMACIST DOCUMENTED: ICD-10-PCS | Mod: CPTII,S$GLB,, | Performed by: NURSE PRACTITIONER

## 2022-11-07 PROCEDURE — 3074F SYST BP LT 130 MM HG: CPT | Mod: CPTII,S$GLB,, | Performed by: NURSE PRACTITIONER

## 2022-11-07 PROCEDURE — 1160F RVW MEDS BY RX/DR IN RCRD: CPT | Mod: CPTII,S$GLB,, | Performed by: NURSE PRACTITIONER

## 2022-11-07 PROCEDURE — 99213 OFFICE O/P EST LOW 20 MIN: CPT | Mod: S$GLB,,, | Performed by: NURSE PRACTITIONER

## 2022-11-07 PROCEDURE — 1159F PR MEDICATION LIST DOCUMENTED IN MEDICAL RECORD: ICD-10-PCS | Mod: CPTII,S$GLB,, | Performed by: NURSE PRACTITIONER

## 2022-11-07 PROCEDURE — 3078F DIAST BP <80 MM HG: CPT | Mod: CPTII,S$GLB,, | Performed by: NURSE PRACTITIONER

## 2022-11-07 PROCEDURE — 99999 PR PBB SHADOW E&M-EST. PATIENT-LVL IV: CPT | Mod: PBBFAC,,, | Performed by: NURSE PRACTITIONER

## 2022-11-07 PROCEDURE — 51798 POCT BLADDER SCAN: ICD-10-PCS | Mod: S$GLB,,, | Performed by: NURSE PRACTITIONER

## 2022-11-07 RX ORDER — FINASTERIDE 5 MG/1
5 TABLET, FILM COATED ORAL DAILY
Qty: 90 TABLET | Refills: 3 | Status: SHIPPED | OUTPATIENT
Start: 2022-11-07 | End: 2023-04-11 | Stop reason: SDUPTHER

## 2022-11-07 NOTE — PROGRESS NOTES
[facility] Note  Staff: KIRSTEN Silver    PCP: CORINNE Still    Chief Complaint: Urinary Retention    Subjective:        HPI: Evelio Pandya is a 82 y.o. male presents today in office for routine recheck.  Pt with hx of BPH with incomplete bladder emptying.    TODAY:  Pt overall doing fine with no complaints voiced today.  Here with family member.  Pt was unable to give us a urine sample this am.  PVR by bladder scan was 101 mL  No gross hematuria, No dysuria at this time.    09/27/22 OV  UA in office today was abnormal at this time.  PVR by bladder scan shows 13 mL  Pt currently taking daily Flomax 0.8 mg daily at this time.  No gross hematuria, No dysuria.    OV on 9/26/22:  Pt was unable to urinate, wearing depends 24/7.  PVR scan of >229 mL at this time.  Pt states he is leaking all day long, unable to hold his urine.  Catheter is not in place, was removed yesterday in ER.  Pt is currently taking Flomax 0.4 mg daily from initial ER visit.     ER Visit on 9/15/22:  History of present illness  This 82 y.o. presents today for complaint of follow-up from emergency department.  Patient had urinary retention and has an indwelling catheter.    Emergency room had recommended follow-up with Urology.  He is taking the Flomax as directed.  Denies any fever chills.  No blood in urine.  Patient denies any back pain.  ER removed almost one liter of urine from bladder.     He was recently admitted at [facility] for possible TIA.  Family reports history of at least 1 prior UTI.  Patient states about every 20 minutes he gets the sensation to urinate feels like he is not completely emptying his bladder.  He denies dysuria or abdominal pain.  Denies fevers chills nausea and vomiting.  To his knowledge he does not have any prostate issues.     REVIEW OF SYSTEMS:  A comprehensive 10 system review was performed and is negative except as noted above in HPI    PMHx:  Past Medical History:   Diagnosis  Date    Diabetes mellitus     Hypertension     TIA (transient ischemic attack)     Urinary retention      PSHx:  History reviewed. No pertinent surgical history.    Allergies:  Patient has no known allergies.    Medications: reviewed   Objective:     Vitals:    11/07/22 0842   BP: 97/67   Pulse: (!) 46     General:WDWN in NAD  Eyes: PERRLA, normal conjunctiva  Respiratory: no increased work on breathing, clear to auscultation  Cardiovascular: regular rate and rhythm. No obvious extremity edema.  GI: palpation of masses. No tenderness. No hepatosplenomegaly to palpation.  Musculoskeletal: normal range of motion of bilateral upper extremities. Normal muscle strength and tone.  Skin: no obvious rashes or lesions. No tightening of skin noted.  Neurologic: CN grossly normal. Normal sensation.   Psychiatric: awake, alert and oriented x 3. Mood and affect normal. Cooperative.    Assessment:       1. Urinary retention    2. Benign prostatic hyperplasia with incomplete bladder emptying          Plan:     Pt was advised to keep taking the Flomax 0.8 mg daily as previously prescribed.  After further discussion with pt and family, pt would like to try Finasteride in order to shrink prostate.  Benefits/Risks were thoroughly reviewed with pt during ov today with all questions answered at this time.    F/UP with me with UA and PVR in six months.    [facility]: Active    Natali East, KIRSTEN

## 2022-12-06 ENCOUNTER — TELEPHONE (OUTPATIENT)
Dept: OPTOMETRY | Facility: CLINIC | Age: 82
End: 2022-12-06
Payer: MEDICARE

## 2022-12-06 NOTE — TELEPHONE ENCOUNTER
Called pt to remind him of eye appt on 12-7-22 with Dr. Basurto.   No answer and unable to leave message due to voice mailbox full.

## 2022-12-30 ENCOUNTER — LAB VISIT (OUTPATIENT)
Dept: LAB | Facility: HOSPITAL | Age: 82
End: 2022-12-30
Attending: FAMILY MEDICINE
Payer: MEDICARE

## 2022-12-30 LAB
ALBUMIN SERPL BCP-MCNC: 4 G/DL (ref 3.5–5.2)
ALP SERPL-CCNC: 85 U/L (ref 55–135)
ALT SERPL W/O P-5'-P-CCNC: 13 U/L (ref 10–44)
ANION GAP SERPL CALC-SCNC: 8 MMOL/L (ref 8–16)
AST SERPL-CCNC: 15 U/L (ref 10–40)
BILIRUB SERPL-MCNC: 1.1 MG/DL (ref 0.1–1)
BUN SERPL-MCNC: 10 MG/DL (ref 8–23)
CALCIUM SERPL-MCNC: 9.4 MG/DL (ref 8.7–10.5)
CHLORIDE SERPL-SCNC: 103 MMOL/L (ref 95–110)
CHOLEST SERPL-MCNC: 148 MG/DL (ref 120–199)
CHOLEST/HDLC SERPL: 3.7 {RATIO} (ref 2–5)
CO2 SERPL-SCNC: 24 MMOL/L (ref 23–29)
CREAT SERPL-MCNC: 1.2 MG/DL (ref 0.5–1.4)
EST. GFR  (NO RACE VARIABLE): >60 ML/MIN/1.73 M^2
ESTIMATED AVG GLUCOSE: 200 MG/DL (ref 68–131)
GLUCOSE SERPL-MCNC: 257 MG/DL (ref 70–110)
HBA1C MFR BLD: 8.6 % (ref 4–5.6)
HDLC SERPL-MCNC: 40 MG/DL (ref 40–75)
HDLC SERPL: 27 % (ref 20–50)
LDLC SERPL CALC-MCNC: 89 MG/DL (ref 63–159)
NONHDLC SERPL-MCNC: 108 MG/DL
POTASSIUM SERPL-SCNC: 3.6 MMOL/L (ref 3.5–5.1)
PROT SERPL-MCNC: 7.7 G/DL (ref 6–8.4)
SODIUM SERPL-SCNC: 135 MMOL/L (ref 136–145)
TRIGL SERPL-MCNC: 95 MG/DL (ref 30–150)
TSH SERPL DL<=0.005 MIU/L-ACNC: 1.72 UIU/ML (ref 0.4–4)

## 2022-12-30 PROCEDURE — 80061 LIPID PANEL: CPT | Performed by: FAMILY MEDICINE

## 2022-12-30 PROCEDURE — 36415 COLL VENOUS BLD VENIPUNCTURE: CPT | Mod: PO | Performed by: FAMILY MEDICINE

## 2022-12-30 PROCEDURE — 84443 ASSAY THYROID STIM HORMONE: CPT | Performed by: FAMILY MEDICINE

## 2022-12-30 PROCEDURE — 80053 COMPREHEN METABOLIC PANEL: CPT | Performed by: FAMILY MEDICINE

## 2022-12-30 PROCEDURE — 83036 HEMOGLOBIN GLYCOSYLATED A1C: CPT | Performed by: FAMILY MEDICINE

## 2023-01-02 PROBLEM — N39.0 URINARY TRACT INFECTION: Status: RESOLVED | Noted: 2019-11-15 | Resolved: 2023-01-02

## 2023-01-26 ENCOUNTER — OFFICE VISIT (OUTPATIENT)
Dept: FAMILY MEDICINE | Facility: CLINIC | Age: 83
End: 2023-01-26
Payer: MEDICARE

## 2023-01-26 VITALS
WEIGHT: 196.63 LBS | DIASTOLIC BLOOD PRESSURE: 60 MMHG | OXYGEN SATURATION: 99 % | SYSTOLIC BLOOD PRESSURE: 104 MMHG | BODY MASS INDEX: 31.6 KG/M2 | HEIGHT: 66 IN | TEMPERATURE: 98 F | HEART RATE: 79 BPM

## 2023-01-26 DIAGNOSIS — I10 HYPERTENSION, UNSPECIFIED TYPE: ICD-10-CM

## 2023-01-26 DIAGNOSIS — H40.1133 PRIMARY OPEN ANGLE GLAUCOMA (POAG) OF BOTH EYES, SEVERE STAGE: ICD-10-CM

## 2023-01-26 DIAGNOSIS — Z79.4 TYPE 2 DIABETES MELLITUS WITH HYPERGLYCEMIA, WITH LONG-TERM CURRENT USE OF INSULIN: Primary | ICD-10-CM

## 2023-01-26 DIAGNOSIS — N40.0 BENIGN PROSTATIC HYPERPLASIA, UNSPECIFIED WHETHER LOWER URINARY TRACT SYMPTOMS PRESENT: ICD-10-CM

## 2023-01-26 DIAGNOSIS — E11.65 TYPE 2 DIABETES MELLITUS WITH HYPERGLYCEMIA, WITH LONG-TERM CURRENT USE OF INSULIN: Primary | ICD-10-CM

## 2023-01-26 LAB — GLUCOSE SERPL-MCNC: 150 MG/DL (ref 70–110)

## 2023-01-26 PROCEDURE — 3074F SYST BP LT 130 MM HG: CPT | Mod: CPTII,S$GLB,, | Performed by: NURSE PRACTITIONER

## 2023-01-26 PROCEDURE — 3078F PR MOST RECENT DIASTOLIC BLOOD PRESSURE < 80 MM HG: ICD-10-PCS | Mod: CPTII,S$GLB,, | Performed by: NURSE PRACTITIONER

## 2023-01-26 PROCEDURE — 99214 OFFICE O/P EST MOD 30 MIN: CPT | Mod: S$GLB,,, | Performed by: NURSE PRACTITIONER

## 2023-01-26 PROCEDURE — 1101F PR PT FALLS ASSESS DOC 0-1 FALLS W/OUT INJ PAST YR: ICD-10-PCS | Mod: CPTII,S$GLB,, | Performed by: NURSE PRACTITIONER

## 2023-01-26 PROCEDURE — 82962 GLUCOSE BLOOD TEST: CPT | Mod: S$GLB,,, | Performed by: NURSE PRACTITIONER

## 2023-01-26 PROCEDURE — 99214 PR OFFICE/OUTPT VISIT, EST, LEVL IV, 30-39 MIN: ICD-10-PCS | Mod: S$GLB,,, | Performed by: NURSE PRACTITIONER

## 2023-01-26 PROCEDURE — 1159F MED LIST DOCD IN RCRD: CPT | Mod: CPTII,S$GLB,, | Performed by: NURSE PRACTITIONER

## 2023-01-26 PROCEDURE — 1126F PR PAIN SEVERITY QUANTIFIED, NO PAIN PRESENT: ICD-10-PCS | Mod: CPTII,S$GLB,, | Performed by: NURSE PRACTITIONER

## 2023-01-26 PROCEDURE — 3288F FALL RISK ASSESSMENT DOCD: CPT | Mod: CPTII,S$GLB,, | Performed by: NURSE PRACTITIONER

## 2023-01-26 PROCEDURE — 82962 POCT GLUCOSE, HAND-HELD DEVICE: ICD-10-PCS | Mod: S$GLB,,, | Performed by: NURSE PRACTITIONER

## 2023-01-26 PROCEDURE — 1126F AMNT PAIN NOTED NONE PRSNT: CPT | Mod: CPTII,S$GLB,, | Performed by: NURSE PRACTITIONER

## 2023-01-26 PROCEDURE — 1159F PR MEDICATION LIST DOCUMENTED IN MEDICAL RECORD: ICD-10-PCS | Mod: CPTII,S$GLB,, | Performed by: NURSE PRACTITIONER

## 2023-01-26 PROCEDURE — 99999 PR PBB SHADOW E&M-EST. PATIENT-LVL III: CPT | Mod: PBBFAC,,, | Performed by: NURSE PRACTITIONER

## 2023-01-26 PROCEDURE — 99999 PR PBB SHADOW E&M-EST. PATIENT-LVL III: ICD-10-PCS | Mod: PBBFAC,,, | Performed by: NURSE PRACTITIONER

## 2023-01-26 PROCEDURE — 1101F PT FALLS ASSESS-DOCD LE1/YR: CPT | Mod: CPTII,S$GLB,, | Performed by: NURSE PRACTITIONER

## 2023-01-26 PROCEDURE — 3288F PR FALLS RISK ASSESSMENT DOCUMENTED: ICD-10-PCS | Mod: CPTII,S$GLB,, | Performed by: NURSE PRACTITIONER

## 2023-01-26 PROCEDURE — 3078F DIAST BP <80 MM HG: CPT | Mod: CPTII,S$GLB,, | Performed by: NURSE PRACTITIONER

## 2023-01-26 PROCEDURE — 3074F PR MOST RECENT SYSTOLIC BLOOD PRESSURE < 130 MM HG: ICD-10-PCS | Mod: CPTII,S$GLB,, | Performed by: NURSE PRACTITIONER

## 2023-01-26 RX ORDER — LISINOPRIL 20 MG/1
20 TABLET ORAL DAILY
COMMUNITY
End: 2023-10-11

## 2023-01-26 RX ORDER — METFORMIN HYDROCHLORIDE 1000 MG/1
2000 TABLET ORAL EVERY MORNING
Qty: 180 TABLET | Refills: 1 | Status: SHIPPED | OUTPATIENT
Start: 2023-01-26 | End: 2023-04-11 | Stop reason: DRUGHIGH

## 2023-01-26 NOTE — PATIENT INSTRUCTIONS
Ez Fraser,     If you are due for any health screening(s) below please notify me so we can arrange them to be ordered and scheduled to maintain your health. Most healthy patients complete it. Don't lose out on improving your health.     Tests to Keep You Healthy    Eye Exam: SCHEDULED  Last Blood Pressure <= 139/89 (1/26/2023): Yes                 Diabetic Retinal Eye Exam    Diabetes is the #1 cause of blindness in the [place] - early detection before signs or symptoms develop can prevent debilitating blindness.    Once-a-year screening is recommended for all diabetic patients. This exam can prevent and treat diabetes complications in the eye before developing symptoms. This can be done with a special camera is used to take photographs of the back of your eye without having to dilate them, or you can see an eye doctor for a full dilated exam.    Although you are still overdue for this important screening, due to the COVID-19 pandemic, we recommend scheduling it in the near future.    A1c every 3-6 months, lipid panel every year, microalbumin (urine test) once per year, comprehensive foot exam once per year, dilated eye exam at least once per year, dental exam every 6 months, flu vaccination every year, and pneumonia vaccination(s) as recommended

## 2023-01-26 NOTE — PROGRESS NOTES
This dictation has been generated using Modal Fluency Dictation some phonetic errors may occur. Please contact author for clarification if needed.     Problem List Items Addressed This Visit       Type 2 diabetes mellitus with hyperglycemia, with long-term current use of insulin - Primary    Overview     Diabetes controlled, hyperglycemia at evening.  Should case of a hypoglycemia in the morning.         Current Assessment & Plan     A1c 8.6 not controlled. Improve diet control.          Relevant Medications    metFORMIN (GLUCOPHAGE) 1000 MG tablet    Other Relevant Orders    POCT Glucose, Hand-Held Device    Diabetic Eye Screening Photo    HTN (hypertension)    BPH (benign prostatic hyperplasia)    Primary open angle glaucoma (POAG) of both eyes, severe stage    Overview     Right eye blindness.             Orders Placed This Encounter    POCT Glucose, Hand-Held Device    Diabetic Eye Screening Photo    metFORMIN (GLUCOPHAGE) 1000 MG tablet     Diabetes uncontrolled.  Needs to improve diet exercise and med compliance.  He has not been staying with his daughter [place]  Hypertension stable and controlled continue current therapy  BPH stable and controlled continue follow-up with Urology continue meds   Primary open-angle glaucoma needs follow-up with optometry    Follow up in about 3 months (around 4/26/2023).    ________________________________________________________________  ________________________________________________________________      Chief Complaint   Patient presents with    Follow-up     History of present illness  This 82 y.o. presents today for complaint of 3 month follow-up.  Reviewed PCP note 3 months ago.  Patient has diabetes hypertension BPH and POAG.  Notes need for follow-up with the eye doctor.  Had been living with his daughter [place] however once he is moved out his blood sugar is not controlled.  Patient has not had control of his diet exercise or medications since he moved out.   "Discussed risk of diabetic complications including but not limited to risk of CVA, diabetic eye disease, heart attack, kidney disease, peripheral vascular disease, neuropathy, and loss of limb.  Primary open angle glaucoma needs a follow-up with eye doctor.  Denies eye pain.    BPH symptoms stable follows with Urology  Hypertension no complaints.      Past Medical History:   Diagnosis Date    Diabetes mellitus     Hypertension     TIA (transient ischemic attack)     Urinary retention        History reviewed. No pertinent surgical history.    History reviewed. No pertinent family history.    Social History     Socioeconomic History    Marital status:    Tobacco Use    Smoking status: Never    Smokeless tobacco: Never   Substance and Sexual Activity    Alcohol use: Not Currently    Sexual activity: Not Currently       Current Outpatient Medications   Medication Sig Dispense Refill    aspirin (ECOTRIN) 81 MG EC tablet Take 81 mg by mouth once daily.      atorvastatin (LIPITOR) 80 MG tablet Take 1 tablet (80 mg total) by mouth once daily. 90 tablet 1    BD ULTRA-FINE SHORT PEN NEEDLE 31 gauge x 5/16" Ndle Inject into the skin.      brimonidine 0.2% (ALPHAGAN) 0.2 % Drop Place 1 drop into both eyes 2 (two) times daily.      clopidogreL (PLAVIX) 75 mg tablet Take 1 tablet (75 mg total) by mouth once daily. 90 tablet 1    COSOPT 22.3-6.8 mg/mL ophthalmic solution Place 1 drop into both eyes 2 (two) times daily.      finasteride (PROSCAR) 5 mg tablet Take 1 tablet (5 mg total) by mouth once daily. 90 tablet 3    gabapentin (NEURONTIN) 300 MG capsule Take 300 mg by mouth every evening.      LEVEMIR FLEXTOUCH U-100 INSULN 100 unit/mL (3 mL) InPn pen Inject 20 Units into the skin every evening. 3 each 11    lisinopriL (PRINIVIL,ZESTRIL) 20 MG tablet Take 20 mg by mouth once daily.      NOVOLOG FLEXPEN U-100 INSULIN 100 unit/mL (3 mL) InPn pen SMARTSI Unit(s) SUB-Q 3 Times Daily      tamsulosin (FLOMAX) 0.4 mg Cap " Take 2 capsules (0.8 mg total) by mouth once daily. 180 capsule 3    travoprost (TRAVATAN Z) 0.004 % ophthalmic solution Place 1 drop into both eyes every evening.      TRUE METRIX GLUCOSE TEST STRIP Strp 3 (three) times daily. Use to test blood glucose      brimonidine 0.15 % OPTH DROP (ALPHAGAN) 0.15 % ophthalmic solution Place 1 drop into both eyes 3 (three) times daily.      metFORMIN (GLUCOPHAGE) 1000 MG tablet Take 2 tablets (2,000 mg total) by mouth every morning. 180 tablet 1     No current facility-administered medications for this visit.       Review of patient's allergies indicates:  No Known Allergies    Physical examination  Vitals Reviewed\  Vitals:    01/26/23 0913   BP: 104/60   Pulse: 79   Temp: 97.8 °F (36.6 °C)     Body mass index is 31.74 kg/m². .     Weight: 89.2 kg (196 lb 10.4 oz)    Gen. Well-dressed well-nourished   Skin warm dry and intact.  No rashes noted.  HEENT.   Eyechanges right eye.     Neck is supple without adenopathy  Chest.  Respirations are even unlabored.  Lungs are clear to auscultation.  Cardiac regular rate and rhythm.  No chest wall adenopathy noted.  Neuro. Awake alert oriented x4.  Normal judgment and cognition noted.  Extremities no clubbing cyanosis or edema noted.     Call or return to clinic prn if these symptoms worsen or fail to improve as anticipated.

## 2023-03-06 ENCOUNTER — PATIENT OUTREACH (OUTPATIENT)
Dept: ADMINISTRATIVE | Facility: OTHER | Age: 83
End: 2023-03-06
Payer: MEDICARE

## 2023-03-06 NOTE — PROGRESS NOTES
CHW - Case Closure    This Community Health Worker spoke to patient via telephone today.   Pt/Caregiver reported: patient declined services  Pt/Caregiver denied any additional needs at this time and agrees with episode closure at this time.  Provided patient with Community Health Worker's contact information and encouraged him/her to contact this Community Health Worker if additional needs arise.

## 2023-03-21 ENCOUNTER — OFFICE VISIT (OUTPATIENT)
Dept: OPHTHALMOLOGY | Facility: CLINIC | Age: 83
End: 2023-03-21
Payer: MEDICARE

## 2023-03-21 DIAGNOSIS — Z96.1 PSEUDOPHAKIA, RIGHT EYE: ICD-10-CM

## 2023-03-21 DIAGNOSIS — H40.1134 PRIMARY OPEN-ANGLE GLAUCOMA, BILATERAL, INDETERMINATE STAGE: Primary | ICD-10-CM

## 2023-03-21 DIAGNOSIS — E11.9 DIABETES MELLITUS TYPE 2 WITHOUT RETINOPATHY: ICD-10-CM

## 2023-03-21 DIAGNOSIS — H25.812 COMBINED FORM OF AGE-RELATED CATARACT, LEFT EYE: ICD-10-CM

## 2023-03-21 PROCEDURE — 1126F PR PAIN SEVERITY QUANTIFIED, NO PAIN PRESENT: ICD-10-PCS | Mod: CPTII,S$GLB,, | Performed by: OPHTHALMOLOGY

## 2023-03-21 PROCEDURE — 1160F RVW MEDS BY RX/DR IN RCRD: CPT | Mod: CPTII,S$GLB,, | Performed by: OPHTHALMOLOGY

## 2023-03-21 PROCEDURE — 76514 PR  US, EYE, FOR CORNEAL THICKNESS: ICD-10-PCS | Mod: S$GLB,,, | Performed by: OPHTHALMOLOGY

## 2023-03-21 PROCEDURE — 99999 PR PBB SHADOW E&M-EST. PATIENT-LVL III: ICD-10-PCS | Mod: PBBFAC,,, | Performed by: OPHTHALMOLOGY

## 2023-03-21 PROCEDURE — 3288F PR FALLS RISK ASSESSMENT DOCUMENTED: ICD-10-PCS | Mod: CPTII,S$GLB,, | Performed by: OPHTHALMOLOGY

## 2023-03-21 PROCEDURE — 92133 CPTRZD OPH DX IMG PST SGM ON: CPT | Mod: S$GLB,,, | Performed by: OPHTHALMOLOGY

## 2023-03-21 PROCEDURE — 99204 PR OFFICE/OUTPT VISIT, NEW, LEVL IV, 45-59 MIN: ICD-10-PCS | Mod: S$GLB,,, | Performed by: OPHTHALMOLOGY

## 2023-03-21 PROCEDURE — 99999 PR PBB SHADOW E&M-EST. PATIENT-LVL III: CPT | Mod: PBBFAC,,, | Performed by: OPHTHALMOLOGY

## 2023-03-21 PROCEDURE — 1126F AMNT PAIN NOTED NONE PRSNT: CPT | Mod: CPTII,S$GLB,, | Performed by: OPHTHALMOLOGY

## 2023-03-21 PROCEDURE — 76514 ECHO EXAM OF EYE THICKNESS: CPT | Mod: S$GLB,,, | Performed by: OPHTHALMOLOGY

## 2023-03-21 PROCEDURE — 1101F PT FALLS ASSESS-DOCD LE1/YR: CPT | Mod: CPTII,S$GLB,, | Performed by: OPHTHALMOLOGY

## 2023-03-21 PROCEDURE — 99204 OFFICE O/P NEW MOD 45 MIN: CPT | Mod: S$GLB,,, | Performed by: OPHTHALMOLOGY

## 2023-03-21 PROCEDURE — 1159F PR MEDICATION LIST DOCUMENTED IN MEDICAL RECORD: ICD-10-PCS | Mod: CPTII,S$GLB,, | Performed by: OPHTHALMOLOGY

## 2023-03-21 PROCEDURE — 92133 POSTERIOR SEGMENT OCT OPTIC NERVE(OCULAR COHERENCE TOMOGRAPHY) - OU - BOTH EYES: ICD-10-PCS | Mod: S$GLB,,, | Performed by: OPHTHALMOLOGY

## 2023-03-21 PROCEDURE — 1159F MED LIST DOCD IN RCRD: CPT | Mod: CPTII,S$GLB,, | Performed by: OPHTHALMOLOGY

## 2023-03-21 PROCEDURE — 1160F PR REVIEW ALL MEDS BY PRESCRIBER/CLIN PHARMACIST DOCUMENTED: ICD-10-PCS | Mod: CPTII,S$GLB,, | Performed by: OPHTHALMOLOGY

## 2023-03-21 PROCEDURE — 2023F PR DILATED RETINAL EXAM W/O EVID OF RETINOPATHY: ICD-10-PCS | Mod: CPTII,S$GLB,, | Performed by: OPHTHALMOLOGY

## 2023-03-21 PROCEDURE — 3288F FALL RISK ASSESSMENT DOCD: CPT | Mod: CPTII,S$GLB,, | Performed by: OPHTHALMOLOGY

## 2023-03-21 PROCEDURE — 2023F DILAT RTA XM W/O RTNOPTHY: CPT | Mod: CPTII,S$GLB,, | Performed by: OPHTHALMOLOGY

## 2023-03-21 PROCEDURE — 1101F PR PT FALLS ASSESS DOC 0-1 FALLS W/OUT INJ PAST YR: ICD-10-PCS | Mod: CPTII,S$GLB,, | Performed by: OPHTHALMOLOGY

## 2023-03-21 RX ORDER — TRAVOPROST OPHTHALMIC SOLUTION 0.04 MG/ML
1 SOLUTION OPHTHALMIC NIGHTLY
Qty: 7.5 ML | Refills: 6 | Status: SHIPPED | OUTPATIENT
Start: 2023-03-21 | End: 2023-12-07 | Stop reason: SDUPTHER

## 2023-03-21 RX ORDER — DORZOLAMIDE HYDROCHLORIDE AND TIMOLOL MALEATE 20; 5 MG/ML; MG/ML
1 SOLUTION/ DROPS OPHTHALMIC EVERY 12 HOURS
Qty: 30 ML | Refills: 6 | Status: SHIPPED | OUTPATIENT
Start: 2023-03-21 | End: 2023-12-07 | Stop reason: SDUPTHER

## 2023-03-21 RX ORDER — BRIMONIDINE TARTRATE 2 MG/ML
1 SOLUTION/ DROPS OPHTHALMIC EVERY 12 HOURS
Qty: 30 ML | Refills: 6 | Status: SHIPPED | OUTPATIENT
Start: 2023-03-21 | End: 2023-12-07 | Stop reason: SDUPTHER

## 2023-03-21 RX ORDER — METFORMIN HYDROCHLORIDE 500 MG/1
TABLET, EXTENDED RELEASE ORAL
COMMUNITY
Start: 2023-01-28 | End: 2023-04-11 | Stop reason: SDUPTHER

## 2023-03-21 NOTE — PROGRESS NOTES
HPI    Glaucoma exam. - Dr. Cheung in [facility]. (Hasn't seen in about 2-3 mths)   States transforming care due to living in [place] now. Denies pain/ FOL/   Floaters. Dm stable.     Blurry vision- night is difficult. No specs.         Hemoglobin A1C       Date                     Value               Ref Range             Status                12/30/2022               8.6 (H)             4.0 - 5.6 %           Final                 09/07/2022               7.7 (H)             4.5 - 5.7 %           Final               Gtts: (using 3-4 yrs)   Brim OU BID (rx TID)   Latano OU QHS   Cosopt OU BID (has neen out for 1+ mths)        Last edited by Yenifer Haider on 3/21/2023  1:36 PM.            Assessment /Plan     For exam results, see Encounter Report.    Primary open-angle glaucoma, bilateral, indeterminate stage  -     travoprost (TRAVATAN Z) 0.004 % ophthalmic solution; Place 1 drop into both eyes every evening.  Dispense: 7.5 mL; Refill: 6  -     brimonidine 0.2% (ALPHAGAN) 0.2 % Drop; Place 1 drop into both eyes every 12 (twelve) hours.  Dispense: 30 mL; Refill: 6  -     dorzolamide-timolol 2-0.5% (COSOPT) 22.3-6.8 mg/mL ophthalmic solution; Place 1 drop into both eyes every 12 (twelve) hours.  Dispense: 30 mL; Refill: 6    Diabetes mellitus type 2 without retinopathy    Pseudophakia, right eye    Combined form of age-related cataract, left eye      1. Primary open-angle glaucoma, bilateral, indeterminate stage  +famhx (mother)  Thin pachy    HM vision OD  OCT NFL damaged OD>OS  Tmax unknown    IOP reasonable, will restart prior regimen  Request records    Brim bid OU  Dorz/viv bid OU  Konrad qhs OU    F/u 3-4 months, HVF OS only, gonio, review records    2. Diabetes mellitus type 2 without retinopathy  Diabetes without retinopathy, discussed with patient importance of glucose control and follow up.  Patient voices understanding.    3. Pseudophakia, right eye  stable    4. Combined form of age-related cataract, left  eye  Moderate, pt not bothered

## 2023-04-11 ENCOUNTER — OFFICE VISIT (OUTPATIENT)
Dept: FAMILY MEDICINE | Facility: CLINIC | Age: 83
End: 2023-04-11
Payer: MEDICARE

## 2023-04-11 VITALS
SYSTOLIC BLOOD PRESSURE: 130 MMHG | OXYGEN SATURATION: 97 % | BODY MASS INDEX: 30.9 KG/M2 | TEMPERATURE: 97 F | HEART RATE: 89 BPM | HEIGHT: 66 IN | WEIGHT: 192.25 LBS | DIASTOLIC BLOOD PRESSURE: 64 MMHG

## 2023-04-11 DIAGNOSIS — N40.0 BENIGN PROSTATIC HYPERPLASIA, UNSPECIFIED WHETHER LOWER URINARY TRACT SYMPTOMS PRESENT: Primary | ICD-10-CM

## 2023-04-11 DIAGNOSIS — E11.65 TYPE 2 DIABETES MELLITUS WITH HYPERGLYCEMIA, WITH LONG-TERM CURRENT USE OF INSULIN: ICD-10-CM

## 2023-04-11 DIAGNOSIS — Z79.4 TYPE 2 DIABETES MELLITUS WITH HYPERGLYCEMIA, WITH LONG-TERM CURRENT USE OF INSULIN: ICD-10-CM

## 2023-04-11 PROCEDURE — 3288F FALL RISK ASSESSMENT DOCD: CPT | Mod: CPTII,S$GLB,, | Performed by: NURSE PRACTITIONER

## 2023-04-11 PROCEDURE — 1101F PR PT FALLS ASSESS DOC 0-1 FALLS W/OUT INJ PAST YR: ICD-10-PCS | Mod: CPTII,S$GLB,, | Performed by: NURSE PRACTITIONER

## 2023-04-11 PROCEDURE — 1160F PR REVIEW ALL MEDS BY PRESCRIBER/CLIN PHARMACIST DOCUMENTED: ICD-10-PCS | Mod: CPTII,S$GLB,, | Performed by: NURSE PRACTITIONER

## 2023-04-11 PROCEDURE — 3075F PR MOST RECENT SYSTOLIC BLOOD PRESS GE 130-139MM HG: ICD-10-PCS | Mod: CPTII,S$GLB,, | Performed by: NURSE PRACTITIONER

## 2023-04-11 PROCEDURE — 3078F PR MOST RECENT DIASTOLIC BLOOD PRESSURE < 80 MM HG: ICD-10-PCS | Mod: CPTII,S$GLB,, | Performed by: NURSE PRACTITIONER

## 2023-04-11 PROCEDURE — 1159F PR MEDICATION LIST DOCUMENTED IN MEDICAL RECORD: ICD-10-PCS | Mod: CPTII,S$GLB,, | Performed by: NURSE PRACTITIONER

## 2023-04-11 PROCEDURE — 1126F AMNT PAIN NOTED NONE PRSNT: CPT | Mod: CPTII,S$GLB,, | Performed by: NURSE PRACTITIONER

## 2023-04-11 PROCEDURE — 99214 PR OFFICE/OUTPT VISIT, EST, LEVL IV, 30-39 MIN: ICD-10-PCS | Mod: S$GLB,,, | Performed by: NURSE PRACTITIONER

## 2023-04-11 PROCEDURE — 3288F PR FALLS RISK ASSESSMENT DOCUMENTED: ICD-10-PCS | Mod: CPTII,S$GLB,, | Performed by: NURSE PRACTITIONER

## 2023-04-11 PROCEDURE — 1160F RVW MEDS BY RX/DR IN RCRD: CPT | Mod: CPTII,S$GLB,, | Performed by: NURSE PRACTITIONER

## 2023-04-11 PROCEDURE — 99999 PR PBB SHADOW E&M-EST. PATIENT-LVL IV: ICD-10-PCS | Mod: PBBFAC,,, | Performed by: NURSE PRACTITIONER

## 2023-04-11 PROCEDURE — 99214 OFFICE O/P EST MOD 30 MIN: CPT | Mod: S$GLB,,, | Performed by: NURSE PRACTITIONER

## 2023-04-11 PROCEDURE — 3078F DIAST BP <80 MM HG: CPT | Mod: CPTII,S$GLB,, | Performed by: NURSE PRACTITIONER

## 2023-04-11 PROCEDURE — 1159F MED LIST DOCD IN RCRD: CPT | Mod: CPTII,S$GLB,, | Performed by: NURSE PRACTITIONER

## 2023-04-11 PROCEDURE — 1101F PT FALLS ASSESS-DOCD LE1/YR: CPT | Mod: CPTII,S$GLB,, | Performed by: NURSE PRACTITIONER

## 2023-04-11 PROCEDURE — 1126F PR PAIN SEVERITY QUANTIFIED, NO PAIN PRESENT: ICD-10-PCS | Mod: CPTII,S$GLB,, | Performed by: NURSE PRACTITIONER

## 2023-04-11 PROCEDURE — 99999 PR PBB SHADOW E&M-EST. PATIENT-LVL IV: CPT | Mod: PBBFAC,,, | Performed by: NURSE PRACTITIONER

## 2023-04-11 PROCEDURE — 3075F SYST BP GE 130 - 139MM HG: CPT | Mod: CPTII,S$GLB,, | Performed by: NURSE PRACTITIONER

## 2023-04-11 RX ORDER — TAMSULOSIN HYDROCHLORIDE 0.4 MG/1
0.8 CAPSULE ORAL DAILY
Qty: 180 CAPSULE | Refills: 3 | Status: SHIPPED | OUTPATIENT
Start: 2023-04-11 | End: 2023-10-05 | Stop reason: SDUPTHER

## 2023-04-11 RX ORDER — INSULIN DETEMIR 100 [IU]/ML
15 INJECTION, SOLUTION SUBCUTANEOUS NIGHTLY
Qty: 15 ML | Refills: 6 | Status: SHIPPED | OUTPATIENT
Start: 2023-04-11 | End: 2024-02-08 | Stop reason: SDUPTHER

## 2023-04-11 RX ORDER — FLUCONAZOLE 100 MG/1
100 TABLET ORAL DAILY
Qty: 7 TABLET | Refills: 0 | Status: SHIPPED | OUTPATIENT
Start: 2023-04-11 | End: 2023-04-18

## 2023-04-11 RX ORDER — FINASTERIDE 5 MG/1
5 TABLET, FILM COATED ORAL DAILY
Qty: 90 TABLET | Refills: 3 | Status: SHIPPED | OUTPATIENT
Start: 2023-04-11 | End: 2023-10-05

## 2023-04-11 RX ORDER — METFORMIN HYDROCHLORIDE 500 MG/1
TABLET, EXTENDED RELEASE ORAL
Qty: 120 TABLET | Refills: 0 | Status: SHIPPED | OUTPATIENT
Start: 2023-04-11 | End: 2023-05-10

## 2023-04-11 NOTE — PROGRESS NOTES
This dictation has been generated using Modal Fluency Dictation some phonetic errors may occur. Please contact author for clarification if needed.     Problem List Items Addressed This Visit       Type 2 diabetes mellitus with hyperglycemia, with long-term current use of insulin    Overview     Diabetes controlled, hyperglycemia at evening.  Should case of a hypoglycemia in the morning.           Relevant Medications    metFORMIN (GLUCOPHAGE-XR) 500 MG ER 24hr tablet    insulin detemir U-100, Levemir, (LEVEMIR FLEXPEN) 100 unit/mL (3 mL) InPn pen    BPH (benign prostatic hyperplasia) - Primary       Orders Placed This Encounter    metFORMIN (GLUCOPHAGE-XR) 500 MG ER 24hr tablet    insulin detemir U-100, Levemir, (LEVEMIR FLEXPEN) 100 unit/mL (3 mL) InPn pen    tamsulosin (FLOMAX) 0.4 mg Cap    finasteride (PROSCAR) 5 mg tablet    fluconazole (DIFLUCAN) 100 MG tablet     Penile yeast infection Diflucan as above   Diabetes discussed diet meds med refill.  Out of med currently.  Discussed uncontrolled sugar contributing to yeast   BPH refill meds    Follow up in about 1 week (around 4/18/2023).    ________________________________________________________________  ________________________________________________________________      Chief Complaint   Patient presents with    Rash     History of present illness  This 83 y.o. presents today for complaint of penile yeast infection.  Onset of symptoms in the last week.  Notes some draining in itching.  He is not sexually active as erectile dysfunction.  He is diabetic and out of Levemir.  There seems to be some confusion around his metformin dosing.  He has not been taking Flomax.  No other complaints.      Past Medical History:   Diagnosis Date    Diabetes mellitus     Hypertension     TIA (transient ischemic attack)     Urinary retention        Past Surgical History:   Procedure Laterality Date    CATARACT EXTRACTION Right     GLAUCOMA SURGERY Right        Family History  "  Family history unknown: Yes       Social History     Socioeconomic History    Marital status:    Tobacco Use    Smoking status: Never    Smokeless tobacco: Never   Substance and Sexual Activity    Alcohol use: Not Currently    Sexual activity: Not Currently       Current Outpatient Medications   Medication Sig Dispense Refill    aspirin (ECOTRIN) 81 MG EC tablet Take 81 mg by mouth once daily.      atorvastatin (LIPITOR) 80 MG tablet Take 1 tablet (80 mg total) by mouth once daily. 90 tablet 1    BD ULTRA-FINE SHORT PEN NEEDLE 31 gauge x 5/16" Ndle Inject into the skin.      brimonidine 0.2% (ALPHAGAN) 0.2 % Drop Place 1 drop into both eyes every 12 (twelve) hours. 30 mL 6    clopidogreL (PLAVIX) 75 mg tablet Take 1 tablet (75 mg total) by mouth once daily. 90 tablet 1    dorzolamide-timolol 2-0.5% (COSOPT) 22.3-6.8 mg/mL ophthalmic solution Place 1 drop into both eyes every 12 (twelve) hours. 30 mL 6    lisinopriL (PRINIVIL,ZESTRIL) 20 MG tablet Take 20 mg by mouth once daily.      NOVOLOG FLEXPEN U-100 INSULIN 100 unit/mL (3 mL) InPn pen SMARTSI Unit(s) SUB-Q 3 Times Daily      travoprost (TRAVATAN Z) 0.004 % ophthalmic solution Place 1 drop into both eyes every evening. 7.5 mL 6    TRUE METRIX GLUCOSE TEST STRIP Strp 3 (three) times daily. Use to test blood glucose      finasteride (PROSCAR) 5 mg tablet Take 1 tablet (5 mg total) by mouth once daily. 90 tablet 3    fluconazole (DIFLUCAN) 100 MG tablet Take 1 tablet (100 mg total) by mouth once daily. for 7 days 7 tablet 0    gabapentin (NEURONTIN) 300 MG capsule Take 300 mg by mouth every evening.      insulin detemir U-100, Levemir, (LEVEMIR FLEXPEN) 100 unit/mL (3 mL) InPn pen Inject 15 Units into the skin every evening. 15 mL 6    metFORMIN (GLUCOPHAGE-XR) 500 MG ER 24hr tablet SMARTSI Tablet(s) By Mouth Every Evening 120 tablet 0    tamsulosin (FLOMAX) 0.4 mg Cap Take 2 capsules (0.8 mg total) by mouth once daily. 180 capsule 3     No current " facility-administered medications for this visit.       Review of patient's allergies indicates:  No Known Allergies    Physical examination  Vitals Reviewed\  Vitals:    04/11/23 1104   BP: 130/64   Pulse: 89   Temp: 97.4 °F (36.3 °C)     Body mass index is 31.03 kg/m². .     Weight: 87.2 kg (192 lb 3.9 oz)    Gen. Well-dressed well-nourished   Skin warm dry and intact.  No rashes noted.  Neuro. Awake alert oriented x4.  Normal judgment and cognition noted.  Extremities no clubbing cyanosis or edema noted.   : penile yeast. White d/c noted. Skin cracking noted.     Call or return to clinic prn if these symptoms worsen or fail to improve as anticipated.

## 2023-04-11 NOTE — PATIENT INSTRUCTIONS
Ez Fraser,     If you are due for any health screening(s) below please notify me so we can arrange them to be ordered and scheduled to maintain your health. Most healthy patients complete it. Don't lose out on improving your health.     All of your core healthy metrics are met.                    Diabetic A1c Testing    Your chart identifies you as having diabetes. It is recommended that all diabetes patients have an A1c test done at least once a year, but [facility] recommends twice a year for most patients. This helps your doctor to better help you manage your diabetes. This is a non-fasting lab test which can be completed at any time. Your result will be sent to your Primary Care Provider for review and that office will contact you with the results.  A1c every 3-6 months, lipid panel every year, microalbumin (urine test) once per year, comprehensive foot exam once per year, dilated eye exam at least once per year, dental exam every 6 months, flu vaccination every year, and pneumonia vaccination(s) as recommended

## 2023-04-13 ENCOUNTER — TELEPHONE (OUTPATIENT)
Dept: FAMILY MEDICINE | Facility: CLINIC | Age: 83
End: 2023-04-13
Payer: MEDICARE

## 2023-04-13 NOTE — TELEPHONE ENCOUNTER
----- Message from Padmini Batista sent at 4/13/2023 11:15 AM CDT -----  Regarding: pt call bk  Type:Patient Returning Call:LUIS WALLACE [[id number]]       Who Called: MA         Who Left Message for Patient: Shaka         Does the patient know what this is regarding? Missed call for re fill meds         Best Call Back Number: Telephone Information:  Mobile          [phone]            Additional Information:

## 2023-04-13 NOTE — TELEPHONE ENCOUNTER
----- Message from Padmini Batista sent at 4/13/2023  9:20 AM CDT -----  Regarding: PT CALL BK  Can the clinic reply in [facility]: NO         Please refill the medication(s) listed below.         Please call the patient when the prescription(s) is ready for  at this phone number  LUIS WALLACE [[id number]]  Pt states medication was not never sent over to pharmacy for his bladder infection. Please send soon pt states he's in pain         Medication #1 Pt not sure name of meds its for (Bladder infection)       Medication #2       Preferred Pharmacy:   [id number] - [place] - [address] [facility] [address]  [address] [address]  Phone: [phone] Fax: [phone]

## 2023-04-13 NOTE — TELEPHONE ENCOUNTER
Returned patients call in regards to medication request. No answer , left voicemail to return call.

## 2023-04-27 ENCOUNTER — OFFICE VISIT (OUTPATIENT)
Dept: FAMILY MEDICINE | Facility: CLINIC | Age: 83
End: 2023-04-27
Payer: MEDICARE

## 2023-04-27 VITALS
TEMPERATURE: 98 F | DIASTOLIC BLOOD PRESSURE: 64 MMHG | HEART RATE: 76 BPM | HEIGHT: 66 IN | BODY MASS INDEX: 31.25 KG/M2 | WEIGHT: 194.44 LBS | SYSTOLIC BLOOD PRESSURE: 118 MMHG | OXYGEN SATURATION: 97 %

## 2023-04-27 DIAGNOSIS — R53.83 FATIGUE, UNSPECIFIED TYPE: ICD-10-CM

## 2023-04-27 DIAGNOSIS — R45.84 ANHEDONIA: ICD-10-CM

## 2023-04-27 DIAGNOSIS — B37.9 YEAST INFECTION: ICD-10-CM

## 2023-04-27 DIAGNOSIS — N40.0 BENIGN PROSTATIC HYPERPLASIA, UNSPECIFIED WHETHER LOWER URINARY TRACT SYMPTOMS PRESENT: ICD-10-CM

## 2023-04-27 DIAGNOSIS — I10 HYPERTENSION, UNSPECIFIED TYPE: Primary | ICD-10-CM

## 2023-04-27 DIAGNOSIS — E11.65 TYPE 2 DIABETES MELLITUS WITH HYPERGLYCEMIA, WITH LONG-TERM CURRENT USE OF INSULIN: ICD-10-CM

## 2023-04-27 DIAGNOSIS — Z79.4 TYPE 2 DIABETES MELLITUS WITH HYPERGLYCEMIA, WITH LONG-TERM CURRENT USE OF INSULIN: ICD-10-CM

## 2023-04-27 PROCEDURE — 3074F PR MOST RECENT SYSTOLIC BLOOD PRESSURE < 130 MM HG: ICD-10-PCS | Mod: CPTII,S$GLB,, | Performed by: NURSE PRACTITIONER

## 2023-04-27 PROCEDURE — 3078F PR MOST RECENT DIASTOLIC BLOOD PRESSURE < 80 MM HG: ICD-10-PCS | Mod: CPTII,S$GLB,, | Performed by: NURSE PRACTITIONER

## 2023-04-27 PROCEDURE — 1101F PT FALLS ASSESS-DOCD LE1/YR: CPT | Mod: CPTII,S$GLB,, | Performed by: NURSE PRACTITIONER

## 2023-04-27 PROCEDURE — 99214 OFFICE O/P EST MOD 30 MIN: CPT | Mod: S$GLB,,, | Performed by: NURSE PRACTITIONER

## 2023-04-27 PROCEDURE — 3288F PR FALLS RISK ASSESSMENT DOCUMENTED: ICD-10-PCS | Mod: CPTII,S$GLB,, | Performed by: NURSE PRACTITIONER

## 2023-04-27 PROCEDURE — 99999 PR PBB SHADOW E&M-EST. PATIENT-LVL V: CPT | Mod: PBBFAC,,, | Performed by: NURSE PRACTITIONER

## 2023-04-27 PROCEDURE — 99214 PR OFFICE/OUTPT VISIT, EST, LEVL IV, 30-39 MIN: ICD-10-PCS | Mod: S$GLB,,, | Performed by: NURSE PRACTITIONER

## 2023-04-27 PROCEDURE — 99999 PR PBB SHADOW E&M-EST. PATIENT-LVL V: ICD-10-PCS | Mod: PBBFAC,,, | Performed by: NURSE PRACTITIONER

## 2023-04-27 PROCEDURE — 1160F PR REVIEW ALL MEDS BY PRESCRIBER/CLIN PHARMACIST DOCUMENTED: ICD-10-PCS | Mod: CPTII,S$GLB,, | Performed by: NURSE PRACTITIONER

## 2023-04-27 PROCEDURE — 1159F MED LIST DOCD IN RCRD: CPT | Mod: CPTII,S$GLB,, | Performed by: NURSE PRACTITIONER

## 2023-04-27 PROCEDURE — 1126F PR PAIN SEVERITY QUANTIFIED, NO PAIN PRESENT: ICD-10-PCS | Mod: CPTII,S$GLB,, | Performed by: NURSE PRACTITIONER

## 2023-04-27 PROCEDURE — 1126F AMNT PAIN NOTED NONE PRSNT: CPT | Mod: CPTII,S$GLB,, | Performed by: NURSE PRACTITIONER

## 2023-04-27 PROCEDURE — 3288F FALL RISK ASSESSMENT DOCD: CPT | Mod: CPTII,S$GLB,, | Performed by: NURSE PRACTITIONER

## 2023-04-27 PROCEDURE — 3078F DIAST BP <80 MM HG: CPT | Mod: CPTII,S$GLB,, | Performed by: NURSE PRACTITIONER

## 2023-04-27 PROCEDURE — 1101F PR PT FALLS ASSESS DOC 0-1 FALLS W/OUT INJ PAST YR: ICD-10-PCS | Mod: CPTII,S$GLB,, | Performed by: NURSE PRACTITIONER

## 2023-04-27 PROCEDURE — 3074F SYST BP LT 130 MM HG: CPT | Mod: CPTII,S$GLB,, | Performed by: NURSE PRACTITIONER

## 2023-04-27 PROCEDURE — 1160F RVW MEDS BY RX/DR IN RCRD: CPT | Mod: CPTII,S$GLB,, | Performed by: NURSE PRACTITIONER

## 2023-04-27 PROCEDURE — 1159F PR MEDICATION LIST DOCUMENTED IN MEDICAL RECORD: ICD-10-PCS | Mod: CPTII,S$GLB,, | Performed by: NURSE PRACTITIONER

## 2023-04-27 NOTE — PROGRESS NOTES
This dictation has been generated using Modal Fluency Dictation some phonetic errors may occur. Please contact author for clarification if needed.     Problem List Items Addressed This Visit       Type 2 diabetes mellitus with hyperglycemia, with long-term current use of insulin    Overview     Diabetes controlled, hyperglycemia at evening.  Should case of a hypoglycemia in the morning.           Relevant Orders    Hemoglobin A1C    Comprehensive Metabolic Panel    Vitamin B12    HTN (hypertension) - Primary    BPH (benign prostatic hyperplasia)     Other Visit Diagnoses       Fatigue, unspecified type        Relevant Orders    Testosterone Panel    Yeast infection        penile. resolved    Anhedonia        Relevant Orders    Vitamin B12            Orders Placed This Encounter    Hemoglobin A1C    Comprehensive Metabolic Panel    Testosterone Panel    Vitamin B12     Penile yeast infection resolved.   Diabetes discussed diet meds med refill.  Out of med currently.  Discussed uncontrolled sugar contributing to yeast . Check labs.   BPH refill meds  Anhedonia and fatigue. Pt requesting help. Check test and vit b.  Fu 2 weeks labs and to address results.     Follow up in about 2 weeks (around 5/11/2023).    ________________________________________________________________  ________________________________________________________________      Chief Complaint   Patient presents with    Follow-up     History of present illness  This 83 y.o. presents today for complaint of Penile yeast resolved. DM due for update of labs. Pt notes fatigue, anhedonia, and ED s/s. He ask about meds for energy.   LOV penile yeast infection.  Onset of symptoms in the last week.  Notes some draining in itching.  He is not sexually active as erectile dysfunction.  He is diabetic and out of Levemir.  There seems to be some confusion around his metformin dosing.  He has not been taking Flomax.  No other complaints.      Past Medical History:  "  Diagnosis Date    Diabetes mellitus     Hypertension     TIA (transient ischemic attack)     Urinary retention        Past Surgical History:   Procedure Laterality Date    CATARACT EXTRACTION Right     GLAUCOMA SURGERY Right        Family History   Family history unknown: Yes       Social History     Socioeconomic History    Marital status:    Tobacco Use    Smoking status: Never    Smokeless tobacco: Never   Substance and Sexual Activity    Alcohol use: Not Currently    Sexual activity: Not Currently       Current Outpatient Medications   Medication Sig Dispense Refill    aspirin (ECOTRIN) 81 MG EC tablet Take 81 mg by mouth once daily.      atorvastatin (LIPITOR) 80 MG tablet Take 1 tablet (80 mg total) by mouth once daily. 90 tablet 1    BD ULTRA-FINE SHORT PEN NEEDLE 31 gauge x 5/16" Ndle Inject into the skin.      brimonidine 0.2% (ALPHAGAN) 0.2 % Drop Place 1 drop into both eyes every 12 (twelve) hours. 30 mL 6    clopidogreL (PLAVIX) 75 mg tablet Take 1 tablet (75 mg total) by mouth once daily. 90 tablet 1    dorzolamide-timolol 2-0.5% (COSOPT) 22.3-6.8 mg/mL ophthalmic solution Place 1 drop into both eyes every 12 (twelve) hours. 30 mL 6    finasteride (PROSCAR) 5 mg tablet Take 1 tablet (5 mg total) by mouth once daily. 90 tablet 3    gabapentin (NEURONTIN) 300 MG capsule Take 300 mg by mouth every evening.      insulin detemir U-100, Levemir, (LEVEMIR FLEXPEN) 100 unit/mL (3 mL) InPn pen Inject 15 Units into the skin every evening. 15 mL 6    lisinopriL (PRINIVIL,ZESTRIL) 20 MG tablet Take 20 mg by mouth once daily.      metFORMIN (GLUCOPHAGE-XR) 500 MG ER 24hr tablet SMARTSI Tablet(s) By Mouth Every Evening 120 tablet 0    NOVOLOG FLEXPEN U-100 INSULIN 100 unit/mL (3 mL) InPn pen SMARTSI Unit(s) SUB-Q 3 Times Daily      tamsulosin (FLOMAX) 0.4 mg Cap Take 2 capsules (0.8 mg total) by mouth once daily. 180 capsule 3    travoprost (TRAVATAN Z) 0.004 % ophthalmic solution Place 1 drop into " both eyes every evening. 7.5 mL 6    TRUE METRIX GLUCOSE TEST STRIP Strp 3 (three) times daily. Use to test blood glucose       No current facility-administered medications for this visit.       Review of patient's allergies indicates:  No Known Allergies    Physical examination  Vitals Reviewed\  Vitals:    04/27/23 0904   BP: 118/64   Pulse: 76   Temp: 97.9 °F (36.6 °C)     Body mass index is 31.38 kg/m². .     Weight: 88.2 kg (194 lb 7.1 oz)    Gen. Well-dressed well-nourished   Skin warm dry and intact.  No rashes noted.  Neuro. Awake alert oriented x4.  Normal judgment and cognition noted.  Extremities no clubbing cyanosis or edema noted.       Call or return to clinic prn if these symptoms worsen or fail to improve as anticipated.

## 2023-04-28 ENCOUNTER — LAB VISIT (OUTPATIENT)
Dept: LAB | Facility: HOSPITAL | Age: 83
End: 2023-04-28
Attending: NURSE PRACTITIONER
Payer: MEDICARE

## 2023-04-28 DIAGNOSIS — Z79.4 TYPE 2 DIABETES MELLITUS WITH HYPERGLYCEMIA, WITH LONG-TERM CURRENT USE OF INSULIN: ICD-10-CM

## 2023-04-28 DIAGNOSIS — R53.83 FATIGUE, UNSPECIFIED TYPE: ICD-10-CM

## 2023-04-28 DIAGNOSIS — R45.84 ANHEDONIA: ICD-10-CM

## 2023-04-28 DIAGNOSIS — E11.65 TYPE 2 DIABETES MELLITUS WITH HYPERGLYCEMIA, WITH LONG-TERM CURRENT USE OF INSULIN: ICD-10-CM

## 2023-04-28 LAB
ALBUMIN SERPL BCP-MCNC: 3.7 G/DL (ref 3.5–5.2)
ALP SERPL-CCNC: 61 U/L (ref 55–135)
ALT SERPL W/O P-5'-P-CCNC: 10 U/L (ref 10–44)
ANION GAP SERPL CALC-SCNC: 9 MMOL/L (ref 8–16)
AST SERPL-CCNC: 12 U/L (ref 10–40)
BILIRUB SERPL-MCNC: 1 MG/DL (ref 0.1–1)
BUN SERPL-MCNC: 14 MG/DL (ref 8–23)
CALCIUM SERPL-MCNC: 9.5 MG/DL (ref 8.7–10.5)
CHLORIDE SERPL-SCNC: 106 MMOL/L (ref 95–110)
CO2 SERPL-SCNC: 26 MMOL/L (ref 23–29)
CREAT SERPL-MCNC: 1.1 MG/DL (ref 0.5–1.4)
EST. GFR  (NO RACE VARIABLE): >60 ML/MIN/1.73 M^2
ESTIMATED AVG GLUCOSE: 220 MG/DL (ref 68–131)
GLUCOSE SERPL-MCNC: 232 MG/DL (ref 70–110)
HBA1C MFR BLD: 9.3 % (ref 4–5.6)
POTASSIUM SERPL-SCNC: 4.4 MMOL/L (ref 3.5–5.1)
PROT SERPL-MCNC: 7.2 G/DL (ref 6–8.4)
SODIUM SERPL-SCNC: 141 MMOL/L (ref 136–145)
VIT B12 SERPL-MCNC: 444 PG/ML (ref 210–950)

## 2023-04-28 PROCEDURE — 80053 COMPREHEN METABOLIC PANEL: CPT | Performed by: NURSE PRACTITIONER

## 2023-04-28 PROCEDURE — 36415 COLL VENOUS BLD VENIPUNCTURE: CPT | Mod: PO | Performed by: NURSE PRACTITIONER

## 2023-04-28 PROCEDURE — 84403 ASSAY OF TOTAL TESTOSTERONE: CPT | Performed by: NURSE PRACTITIONER

## 2023-04-28 PROCEDURE — 82607 VITAMIN B-12: CPT | Performed by: NURSE PRACTITIONER

## 2023-04-28 PROCEDURE — 84270 ASSAY OF SEX HORMONE GLOBUL: CPT | Performed by: NURSE PRACTITIONER

## 2023-04-28 PROCEDURE — 83036 HEMOGLOBIN GLYCOSYLATED A1C: CPT | Performed by: NURSE PRACTITIONER

## 2023-05-01 RX ORDER — INSULIN ASPART 100 [IU]/ML
INJECTION, SOLUTION INTRAVENOUS; SUBCUTANEOUS
Qty: 15 ML | Refills: 11 | Status: SHIPPED | OUTPATIENT
Start: 2023-05-01 | End: 2024-02-08 | Stop reason: SDUPTHER

## 2023-05-04 LAB
ALBUMIN SERPL-MCNC: 3.7 G/DL (ref 3.6–5.1)
SHBG SERPL-SCNC: 50 NMOL/L (ref 22–77)
TESTOST FREE SERPL-MCNC: 40.1 PG/ML (ref 6–73)
TESTOST SERPL-MCNC: 421 NG/DL (ref 250–1100)
TESTOSTERONE.FREE+WB SERPL-MCNC: 68.6 NG/DL (ref 15–150)

## 2023-05-09 NOTE — TELEPHONE ENCOUNTER
Refill Routing Note   Medication(s) are not appropriate for processing by [facility] for the following reason(s):      Non-participating provider    [facility] action(s):  Route None identified          Appointments  past 12m or future 3m with PCP    Date Provider   Last Visit   4/27/2023 Arnoldo Still NP   Next Visit   5/12/2023 Arnoldo Still NP   ED visits in past 90 days: 0        Note composed:9:28 AM 05/09/2023

## 2023-05-10 RX ORDER — METFORMIN HYDROCHLORIDE 500 MG/1
TABLET, EXTENDED RELEASE ORAL
Qty: 120 TABLET | Refills: 0 | Status: SHIPPED | OUTPATIENT
Start: 2023-05-10 | End: 2023-10-11

## 2023-05-12 ENCOUNTER — OFFICE VISIT (OUTPATIENT)
Dept: FAMILY MEDICINE | Facility: CLINIC | Age: 83
End: 2023-05-12
Payer: MEDICARE

## 2023-05-12 VITALS
HEART RATE: 94 BPM | HEIGHT: 66 IN | TEMPERATURE: 98 F | OXYGEN SATURATION: 97 % | DIASTOLIC BLOOD PRESSURE: 62 MMHG | WEIGHT: 193.31 LBS | SYSTOLIC BLOOD PRESSURE: 116 MMHG | BODY MASS INDEX: 31.07 KG/M2

## 2023-05-12 DIAGNOSIS — I10 HYPERTENSION, UNSPECIFIED TYPE: ICD-10-CM

## 2023-05-12 DIAGNOSIS — Z79.4 TYPE 2 DIABETES MELLITUS WITH HYPERGLYCEMIA, WITH LONG-TERM CURRENT USE OF INSULIN: Primary | ICD-10-CM

## 2023-05-12 DIAGNOSIS — E11.65 TYPE 2 DIABETES MELLITUS WITH HYPERGLYCEMIA, WITH LONG-TERM CURRENT USE OF INSULIN: Primary | ICD-10-CM

## 2023-05-12 DIAGNOSIS — R53.83 FATIGUE, UNSPECIFIED TYPE: ICD-10-CM

## 2023-05-12 PROCEDURE — 1159F MED LIST DOCD IN RCRD: CPT | Mod: CPTII,S$GLB,, | Performed by: NURSE PRACTITIONER

## 2023-05-12 PROCEDURE — 99999 PR PBB SHADOW E&M-EST. PATIENT-LVL III: CPT | Mod: PBBFAC,,, | Performed by: NURSE PRACTITIONER

## 2023-05-12 PROCEDURE — 3078F PR MOST RECENT DIASTOLIC BLOOD PRESSURE < 80 MM HG: ICD-10-PCS | Mod: CPTII,S$GLB,, | Performed by: NURSE PRACTITIONER

## 2023-05-12 PROCEDURE — 1101F PR PT FALLS ASSESS DOC 0-1 FALLS W/OUT INJ PAST YR: ICD-10-PCS | Mod: CPTII,S$GLB,, | Performed by: NURSE PRACTITIONER

## 2023-05-12 PROCEDURE — 99213 PR OFFICE/OUTPT VISIT, EST, LEVL III, 20-29 MIN: ICD-10-PCS | Mod: S$GLB,,, | Performed by: NURSE PRACTITIONER

## 2023-05-12 PROCEDURE — 3288F FALL RISK ASSESSMENT DOCD: CPT | Mod: CPTII,S$GLB,, | Performed by: NURSE PRACTITIONER

## 2023-05-12 PROCEDURE — 3074F PR MOST RECENT SYSTOLIC BLOOD PRESSURE < 130 MM HG: ICD-10-PCS | Mod: CPTII,S$GLB,, | Performed by: NURSE PRACTITIONER

## 2023-05-12 PROCEDURE — 1159F PR MEDICATION LIST DOCUMENTED IN MEDICAL RECORD: ICD-10-PCS | Mod: CPTII,S$GLB,, | Performed by: NURSE PRACTITIONER

## 2023-05-12 PROCEDURE — 1126F AMNT PAIN NOTED NONE PRSNT: CPT | Mod: CPTII,S$GLB,, | Performed by: NURSE PRACTITIONER

## 2023-05-12 PROCEDURE — 3078F DIAST BP <80 MM HG: CPT | Mod: CPTII,S$GLB,, | Performed by: NURSE PRACTITIONER

## 2023-05-12 PROCEDURE — 1126F PR PAIN SEVERITY QUANTIFIED, NO PAIN PRESENT: ICD-10-PCS | Mod: CPTII,S$GLB,, | Performed by: NURSE PRACTITIONER

## 2023-05-12 PROCEDURE — 99999 PR PBB SHADOW E&M-EST. PATIENT-LVL III: ICD-10-PCS | Mod: PBBFAC,,, | Performed by: NURSE PRACTITIONER

## 2023-05-12 PROCEDURE — 99213 OFFICE O/P EST LOW 20 MIN: CPT | Mod: S$GLB,,, | Performed by: NURSE PRACTITIONER

## 2023-05-12 PROCEDURE — 3074F SYST BP LT 130 MM HG: CPT | Mod: CPTII,S$GLB,, | Performed by: NURSE PRACTITIONER

## 2023-05-12 PROCEDURE — 3288F PR FALLS RISK ASSESSMENT DOCUMENTED: ICD-10-PCS | Mod: CPTII,S$GLB,, | Performed by: NURSE PRACTITIONER

## 2023-05-12 PROCEDURE — 1101F PT FALLS ASSESS-DOCD LE1/YR: CPT | Mod: CPTII,S$GLB,, | Performed by: NURSE PRACTITIONER

## 2023-05-12 NOTE — PATIENT INSTRUCTIONS
Ez Fraser,     If you are due for any health screening(s) below please notify me so we can arrange them to be ordered and scheduled to maintain your health. Most healthy patients complete it. Don't lose out on improving your health.     All of your core healthy metrics are met.                      A1c every 3-6 months, lipid panel every year, microalbumin (urine test) once per year, comprehensive foot exam once per year, dilated eye exam at least once per year, dental exam every 6 months, flu vaccination every year, and pneumonia vaccination(s) as recommended

## 2023-05-12 NOTE — PROGRESS NOTES
This dictation has been generated using Modal Fluency Dictation some phonetic errors may occur. Please contact author for clarification if needed.     Problem List Items Addressed This Visit       Type 2 diabetes mellitus with hyperglycemia, with long-term current use of insulin - Primary    Overview     Diabetes controlled, hyperglycemia at evening.  Should case of a hypoglycemia in the morning.           Relevant Orders    Hemoglobin A1C    Comprehensive Metabolic Panel    HTN (hypertension)     Other Visit Diagnoses       Fatigue, unspecified type                Orders Placed This Encounter    Hemoglobin A1C    Comprehensive Metabolic Panel     Penile yeast infection resolved.   Diabetes discussed diet meds med refill.  Recently resumed meds. .  Discussed uncontrolled sugar contributing to yeast . Labs noted. A1c is not controlled.   BPH refill meds  Anhedonia and fatigue. Pt requesting help. Check test and vit b.  Fu 2 weeks labs and to address results.     No follow-ups on file.    ________________________________________________________________  ________________________________________________________________      Chief Complaint   Patient presents with    Follow-up     History of present illness  This 83 y.o. presents today for complaint of follow up labs. A1c is 9+. Discussed with pt and son-in-law.   LOV Penile yeast resolved. DM due for update of labs. Pt notes fatigue, anhedonia, and ED s/s. He ask about meds for energy.   LOV penile yeast infection.  Onset of symptoms in the last week.  Notes some draining in itching.  He is not sexually active as erectile dysfunction.  He is diabetic and out of Levemir.  There seems to be some confusion around his metformin dosing.  He has not been taking Flomax.  No other complaints.      Past Medical History:   Diagnosis Date    Diabetes mellitus     Hypertension     TIA (transient ischemic attack)     Urinary retention        Past Surgical History:   Procedure  "Laterality Date    CATARACT EXTRACTION Right     GLAUCOMA SURGERY Right        Family History   Family history unknown: Yes       Social History     Socioeconomic History    Marital status:    Tobacco Use    Smoking status: Never    Smokeless tobacco: Never   Substance and Sexual Activity    Alcohol use: Not Currently    Sexual activity: Not Currently       Current Outpatient Medications   Medication Sig Dispense Refill    aspirin (ECOTRIN) 81 MG EC tablet Take 81 mg by mouth once daily.      atorvastatin (LIPITOR) 80 MG tablet Take 1 tablet (80 mg total) by mouth once daily. 90 tablet 1    BD ULTRA-FINE SHORT PEN NEEDLE 31 gauge x 5/16" Ndle Inject into the skin.      brimonidine 0.2% (ALPHAGAN) 0.2 % Drop Place 1 drop into both eyes every 12 (twelve) hours. 30 mL 6    clopidogreL (PLAVIX) 75 mg tablet Take 1 tablet (75 mg total) by mouth once daily. 90 tablet 1    dorzolamide-timolol 2-0.5% (COSOPT) 22.3-6.8 mg/mL ophthalmic solution Place 1 drop into both eyes every 12 (twelve) hours. 30 mL 6    finasteride (PROSCAR) 5 mg tablet Take 1 tablet (5 mg total) by mouth once daily. 90 tablet 3    gabapentin (NEURONTIN) 300 MG capsule Take 300 mg by mouth every evening.      insulin detemir U-100, Levemir, (LEVEMIR FLEXPEN) 100 unit/mL (3 mL) InPn pen Inject 15 Units into the skin every evening. 15 mL 6    lisinopriL (PRINIVIL,ZESTRIL) 20 MG tablet Take 20 mg by mouth once daily.      metFORMIN (GLUCOPHAGE-XR) 500 MG ER 24hr tablet TAKE 4 TABLETS BY MOUTH EVERY EVENING 120 tablet 0    NOVOLOG FLEXPEN U-100 INSULIN 100 unit/mL (3 mL) InPn pen SMARTSI Unit(s) SUB-Q 3 Times Daily 15 mL 11    tamsulosin (FLOMAX) 0.4 mg Cap Take 2 capsules (0.8 mg total) by mouth once daily. 180 capsule 3    travoprost (TRAVATAN Z) 0.004 % ophthalmic solution Place 1 drop into both eyes every evening. 7.5 mL 6    TRUE METRIX GLUCOSE TEST STRIP Strp 3 (three) times daily. Use to test blood glucose       No current " facility-administered medications for this visit.       Review of patient's allergies indicates:  No Known Allergies    Physical examination  Vitals Reviewed\  Vitals:    05/12/23 1111   BP: 116/62   Pulse: 94   Temp: 98 °F (36.7 °C)     Body mass index is 31.21 kg/m². .     Weight: 87.7 kg (193 lb 5.5 oz)    Gen. Well-dressed well-nourished   Skin warm dry and intact.  No rashes noted.  Neuro. Awake alert oriented x4.  Normal judgment and cognition noted.  Extremities no clubbing cyanosis or edema noted.       Call or return to clinic prn if these symptoms worsen or fail to improve as anticipated.

## 2023-05-18 RX ORDER — CLOPIDOGREL BISULFATE 75 MG/1
TABLET ORAL
Qty: 90 TABLET | Refills: 1 | Status: SHIPPED | OUTPATIENT
Start: 2023-05-18

## 2023-05-29 ENCOUNTER — TELEPHONE (OUTPATIENT)
Dept: PODIATRY | Facility: CLINIC | Age: 83
End: 2023-05-29
Payer: MEDICARE

## 2023-05-29 NOTE — TELEPHONE ENCOUNTER
I attempted to contact pt in regards to scheduling an appointment with an Podiatrist for Diabetic foot exam per his referral from [facility].

## 2023-05-30 ENCOUNTER — TELEPHONE (OUTPATIENT)
Dept: FAMILY MEDICINE | Facility: CLINIC | Age: 83
End: 2023-05-30
Payer: MEDICARE

## 2023-05-30 NOTE — TELEPHONE ENCOUNTER
----- Message from Nikkojorge Anjali sent at 5/30/2023  2:48 PM CDT -----  Type:  Sooner Appointment Request    Caller is requesting a sooner appointment.  Caller declined first available appointment listed below.  Caller will not accept being placed on the waitlist and is requesting a message be sent to doctor.    Name of Caller:  pt  When is the first available appointment?  N/A  Symptoms:  Insulin needle broke off in his stomach  Best Call Back Number:  [phone]  Additional Information:  pt wants to be seen tomorrow. Please call back to advise Thanks!

## 2023-05-30 NOTE — TELEPHONE ENCOUNTER
Spoke to patient via phone. Patient advised to go to the emergency room. Patient states he is unable to go to the emergency room until tomorrow. Daughter stated that needle has been in there for 2 weeks now. Advised to go to the emergency room as soon as possible. Patient expressed understanding.

## 2023-05-31 ENCOUNTER — HOSPITAL ENCOUNTER (EMERGENCY)
Facility: HOSPITAL | Age: 83
Discharge: HOME | End: 2023-05-31
Attending: EMERGENCY MEDICINE
Payer: MEDICARE

## 2023-05-31 VITALS
DIASTOLIC BLOOD PRESSURE: 92 MMHG | OXYGEN SATURATION: 99 % | SYSTOLIC BLOOD PRESSURE: 142 MMHG | TEMPERATURE: 98 F | RESPIRATION RATE: 18 BRPM | HEART RATE: 76 BPM | BODY MASS INDEX: 29.86 KG/M2 | WEIGHT: 185 LBS

## 2023-05-31 DIAGNOSIS — Z71.1 NO PROBLEM, FEARED COMPLAINT UNFOUNDED: Primary | ICD-10-CM

## 2023-05-31 PROCEDURE — 99283 EMERGENCY DEPT VISIT LOW MDM: CPT

## 2023-06-01 NOTE — ED PROVIDER NOTES
Encounter Date: 5/31/2023    SCRIBE #1 NOTE: I, Elvis Juanita, am scribing for, and in the presence of,  Victor M Laughlin MD.     History     Chief Complaint   Patient presents with    Foreign Body in Skin     C/o right lower abdominal irritation, believes he has the tip or part of an insulin needle in his skin/abdomen for a couple weeks.     Time seen by provider: 7:53 PM on 05/31/2023    Evelio Pandya is a 83 y.o. male who presents to the ED with a foreign body in his abdomen. The patient reports some irritation in his abdominal region. He suspects a needle broke off in his abdomen while administering insulin about 2 weeks ago. The patient denies any other symptoms at this time. PMHx of HTN, TIA, and DM. No pertinent PSHx.    The history is provided by the patient.   Review of patient's allergies indicates:  No Known Allergies  Past Medical History:   Diagnosis Date    Diabetes mellitus     Hypertension     TIA (transient ischemic attack)     Urinary retention      Past Surgical History:   Procedure Laterality Date    CATARACT EXTRACTION Right     GLAUCOMA SURGERY Right      Family History   Family history unknown: Yes     Social History     Tobacco Use    Smoking status: Never    Smokeless tobacco: Never   Substance Use Topics    Alcohol use: Not Currently     Review of Systems   All other systems reviewed and are negative.    Physical Exam     Initial Vitals   BP Pulse Resp Temp SpO2   05/31/23 1944 05/31/23 1944 05/31/23 1944 05/31/23 1947 05/31/23 1944   (!) 142/92 76 18 98 °F (36.7 °C) 99 %      MAP       --                Physical Exam    Nursing note and vitals reviewed.  Constitutional: He appears well-developed and well-nourished.   HENT:   Head: Normocephalic and atraumatic.   Eyes: Conjunctivae are normal.   Neck: Neck supple.   Cardiovascular:  Normal rate, regular rhythm, normal heart sounds and intact distal pulses.     Exam reveals no gallop and no friction rub.       No murmur heard.  Abdominal:  Abdomen is soft. He exhibits no distension. There is no abdominal tenderness.   Musculoskeletal:         General: Normal range of motion.      Cervical back: Neck supple.     Neurological: He is alert and oriented to person, place, and time.   Skin: No rash noted. No erythema.   Psychiatric: He has a normal mood and affect.       ED Course   Procedures  Labs Reviewed - No data to display       Imaging Results              X-Ray Abdomen AP and Lateral (Final result)  Result time 05/31/23 20:46:12      Final result by John Nick DO (05/31/23 20:46:12)                   Impression:      No radiopaque foreign body.      Electronically signed by: John Nick  Date:    05/31/2023  Time:    20:46               Narrative:    EXAMINATION:  XR ABDOMEN AP AND LATERAL    CLINICAL HISTORY:  Foreign body;    TECHNIQUE:  AP and lateral radiographs of the abdomen.    COMPARISON:  None    FINDINGS:  There is no radiopaque foreign body.  There is a normal, nonobstructive bowel gas pattern.  There is no free air.  There is no abnormal calcification. The visualized osseous structures are unremarkable. The visualized lung bases are clear.                                    X-Rays:   Independently Interpreted Readings:   Other Readings:  X-ray evaluation AP lateral abdomen no evidence of foreign body appreciated.  Medications - No data to display  Medical Decision Making:   History:   Old Medical Records: I decided to obtain old medical records.  Clinical Tests:   Radiological Study: Ordered and Reviewed  ED Management:  This patient presenting concerns secondary to feeling what he believes is a needle from his insulin medications.  He injects in his anterior abdominal wall and feels that he has a needle within his soft tissue in that area.  Physical examination was completely unremarkable by my examination.  I did obtain an x-ray to rule out the possibility of a metal foreign object in the patient's anterior abdominal wall and  none was found by Radiology nor by me.        Scribe Attestation:   Scribe #1: I performed the above scribed service and the documentation accurately describes the services I performed. I attest to the accuracy of the note.                 This document was produced by a scribe under my direction and in my presence. I agree with the content of the note and have made any necessary edits.     Victor M Laughlin MD    06/01/2023 7:03 AM    Clinical Impression:   Final diagnoses:  [Z71.1] No problem, feared complaint unfounded (Primary)        ED Disposition Condition    Discharge Stable          ED Prescriptions    None       Follow-up Information       Follow up With Specialties Details Why Contact Info    Arnoldo Still NP Internal Medicine Schedule an appointment as soon as possible for a visit  As needed [address] [facility] [address]  [phone]               Victor M Laughlin MD  06/01/23 0703

## 2023-06-01 NOTE — ED NOTES
Pt c/o pinpoint tenderness just to the R of his navel that he thinks is a broken insulin needle that has been irritating him for about 2 weeks. Denies n/v/d/f or urinary symptoms. Denies generalized abd pain.

## 2023-06-13 NOTE — PATIENT INSTRUCTIONS
Your Diabetes Foot Care Program    Every day you depend on your feet to keep you moving. But when you have diabetes, your feet need special care. Even a small foot problem can become very serious. So dont take your feet for granted. By working with your diabetes healthcare team, you can learn how to protect your feet and keep them healthy.  Evaluating your feet  An evaluation helps your healthcare provider check the condition of your feet. The evaluation includes a review of your diabetes history and overall health. It may also include a foot exam, X-rays, or other tests. These can help show problems beneath the skin that you cant see or feel.  Medical history  You will be asked about your overall health and any history of foot problems. Youll also discuss your diabetes history, such as whether your blood sugar level has changed over time. It also includes questions about sensations of pain, tingling, pins and needles, or numbness. Your healthcare provider will also want to know if you have high blood pressure and heart disease, or if you smoke. Be sure to mention any medicines (including over-the-counter), supplements, or herbal remedies you take.  Foot exam  A foot exam checks the condition of different parts of your foot. First, your skin and nails are examined for any signs of infection. Blood flow is checked by feeling for the pulses in each foot. You may also have tests to study the nerves in the foot. These include using a small filament (wire) to see how sensitive your feet are. In certain cases, you will be asked to walk a short distance to check for bone, joint, and muscle problems.  Diagnostic tests  If needed, your healthcare provider will suggest certain tests to learn more about your feet. These include:  Doppler tests to measure blood flow in the feet and lower leg.  X-rays, which can show bone or joint problems.  Other imaging tests, such as an MRI (magnetic resonance imaging), bone scan, and CT  (computed tomography) scan. These can help show bone infections.  Other tests, such as vascular tests, which study the blood flow in your feet and legs. You may also have nerve studies to learn how sensitive your feet are.  Creating a foot care program  Based on the evaluation, your healthcare provider will create a foot care program for you. Your program may be as simple as starting a daily self-care routine and changing the types of shoes your wear. It may also involve treating minor foot problems, such as a corn or blister. In some cases, surgery will be needed to treat an infection or mechanical problems, such as hammer toes.  Preventing problems  When you have diabetes, its easier to prevent problems than to treat them later on. So see your healthcare team for regular checkups and foot care. Your healthcare team can also help you learn more about caring for your feet at home. For example, you may be told to avoid walking barefoot. Or you may be told that special footwear is needed to protect your feet.  Have regular checkups  Foot problems can develop quickly. So be sure to follow your healthcare teams schedule for regular checkups. During office visits, take off your shoes and socks as soon as you get in the exam room. Ask your healthcare provider to examine your feet for problems. This will make it easier to find and treat small skin irritations before they get worse. Regular checkups can also help keep track of the blood flow and feeling in your feet. If you have neuropathy (lack of feeling in your feet), you will need to have checkups more often.  Learn about self-care  The more you know about diabetes and your feet, the easier it will be to prevent problems. Members of your healthcare team can teach you how to inspect your feet and teach you to look for warning signs. They can also give you other foot care tips. During office visits, be sure to ask any questions you have.  Date Last Reviewed: 7/1/2016  ©  5501-5654 [employer]. [address] [place] [address], [place] [address]. All rights reserved. This information is not intended as a substitute for professional medical care. Always follow your healthcare professional's instructions.

## 2023-06-16 NOTE — TELEPHONE ENCOUNTER
----- Message from Swati Beltran, Patient Care Assistant sent at 6/16/2023  4:00 PM CDT -----  Contact: Jaqueline urban  Type:  RX Refill Request    Who Called:  [place] daughter   Refill or New Rx:  refill  RX Name and Strength:  TRUE METRIX GLUCOSE TEST STRIP Strp  How is the patient currently taking it? (ex. 1XDay):  as directed  Is this a 30 day or 90 day RX:  90  Preferred Pharmacy with phone number:    [id number] - [place] - [address] [facility] [address]  [address] [place] [address]  Phone: [phone] Fax: [phone]  Local or Mail Order:  local  Ordering Provider: Arnoldo Esquivel Call Back Number:  [phone]  Additional Information:  thanks

## 2023-06-21 RX ORDER — CALCIUM CITRATE/VITAMIN D3 200MG-6.25
TABLET ORAL
Qty: 100 EACH | Refills: 11 | Status: SHIPPED | OUTPATIENT
Start: 2023-06-21

## 2023-06-27 ENCOUNTER — OFFICE VISIT (OUTPATIENT)
Dept: PODIATRY | Facility: CLINIC | Age: 83
End: 2023-06-27
Payer: MEDICARE

## 2023-06-27 VITALS
BODY MASS INDEX: 31.5 KG/M2 | WEIGHT: 196 LBS | HEART RATE: 78 BPM | OXYGEN SATURATION: 98 % | RESPIRATION RATE: 16 BRPM | HEIGHT: 66 IN

## 2023-06-27 DIAGNOSIS — B35.1 ONYCHOMYCOSIS DUE TO DERMATOPHYTE: ICD-10-CM

## 2023-06-27 DIAGNOSIS — M20.42 HAMMER TOES OF BOTH FEET: ICD-10-CM

## 2023-06-27 DIAGNOSIS — B35.3 TINEA PEDIS OF BOTH FEET: ICD-10-CM

## 2023-06-27 DIAGNOSIS — R60.0 BILATERAL LOWER EXTREMITY EDEMA: ICD-10-CM

## 2023-06-27 DIAGNOSIS — E11.9 ENCOUNTER FOR DIABETIC FOOT EXAM: ICD-10-CM

## 2023-06-27 DIAGNOSIS — M20.41 HAMMER TOES OF BOTH FEET: ICD-10-CM

## 2023-06-27 DIAGNOSIS — Z79.4 TYPE 2 DIABETES MELLITUS WITH HYPERGLYCEMIA, WITH LONG-TERM CURRENT USE OF INSULIN: Primary | ICD-10-CM

## 2023-06-27 DIAGNOSIS — E11.65 TYPE 2 DIABETES MELLITUS WITH HYPERGLYCEMIA, WITH LONG-TERM CURRENT USE OF INSULIN: Primary | ICD-10-CM

## 2023-06-27 DIAGNOSIS — M20.5X2 ACQUIRED HALLUX LIMITUS OF BOTH FEET: ICD-10-CM

## 2023-06-27 DIAGNOSIS — M20.5X1 ACQUIRED HALLUX LIMITUS OF BOTH FEET: ICD-10-CM

## 2023-06-27 PROCEDURE — 1159F MED LIST DOCD IN RCRD: CPT | Mod: CPTII,S$GLB,, | Performed by: PODIATRIST

## 2023-06-27 PROCEDURE — 1160F RVW MEDS BY RX/DR IN RCRD: CPT | Mod: CPTII,S$GLB,, | Performed by: PODIATRIST

## 2023-06-27 PROCEDURE — 99204 OFFICE O/P NEW MOD 45 MIN: CPT | Mod: S$GLB,,, | Performed by: PODIATRIST

## 2023-06-27 PROCEDURE — 1126F AMNT PAIN NOTED NONE PRSNT: CPT | Mod: CPTII,S$GLB,, | Performed by: PODIATRIST

## 2023-06-27 PROCEDURE — 99204 PR OFFICE/OUTPT VISIT, NEW, LEVL IV, 45-59 MIN: ICD-10-PCS | Mod: S$GLB,,, | Performed by: PODIATRIST

## 2023-06-27 PROCEDURE — 1160F PR REVIEW ALL MEDS BY PRESCRIBER/CLIN PHARMACIST DOCUMENTED: ICD-10-PCS | Mod: CPTII,S$GLB,, | Performed by: PODIATRIST

## 2023-06-27 PROCEDURE — 3288F FALL RISK ASSESSMENT DOCD: CPT | Mod: CPTII,S$GLB,, | Performed by: PODIATRIST

## 2023-06-27 PROCEDURE — 1159F PR MEDICATION LIST DOCUMENTED IN MEDICAL RECORD: ICD-10-PCS | Mod: CPTII,S$GLB,, | Performed by: PODIATRIST

## 2023-06-27 PROCEDURE — 99999 PR PBB SHADOW E&M-EST. PATIENT-LVL III: ICD-10-PCS | Mod: PBBFAC,,, | Performed by: PODIATRIST

## 2023-06-27 PROCEDURE — 3288F PR FALLS RISK ASSESSMENT DOCUMENTED: ICD-10-PCS | Mod: CPTII,S$GLB,, | Performed by: PODIATRIST

## 2023-06-27 PROCEDURE — 1101F PT FALLS ASSESS-DOCD LE1/YR: CPT | Mod: CPTII,S$GLB,, | Performed by: PODIATRIST

## 2023-06-27 PROCEDURE — 1101F PR PT FALLS ASSESS DOC 0-1 FALLS W/OUT INJ PAST YR: ICD-10-PCS | Mod: CPTII,S$GLB,, | Performed by: PODIATRIST

## 2023-06-27 PROCEDURE — 1126F PR PAIN SEVERITY QUANTIFIED, NO PAIN PRESENT: ICD-10-PCS | Mod: CPTII,S$GLB,, | Performed by: PODIATRIST

## 2023-06-27 PROCEDURE — 99999 PR PBB SHADOW E&M-EST. PATIENT-LVL III: CPT | Mod: PBBFAC,,, | Performed by: PODIATRIST

## 2023-06-27 RX ORDER — AMLODIPINE BESYLATE 10 MG/1
10 TABLET ORAL
COMMUNITY
Start: 2023-06-05

## 2023-06-27 RX ORDER — CLOTRIMAZOLE AND BETAMETHASONE DIPROPIONATE 10; .64 MG/G; MG/G
CREAM TOPICAL 2 TIMES DAILY
Qty: 45 G | Refills: 3 | Status: SHIPPED | OUTPATIENT
Start: 2023-06-27 | End: 2023-10-11 | Stop reason: ALTCHOICE

## 2023-06-27 RX ORDER — CICLOPIROX 80 MG/ML
SOLUTION TOPICAL NIGHTLY
Qty: 6.6 ML | Refills: 5 | Status: SHIPPED | OUTPATIENT
Start: 2023-06-27 | End: 2023-10-11 | Stop reason: ALTCHOICE

## 2023-06-27 NOTE — PROGRESS NOTES
"  [address] [place] [address]  Phone: [phone]   [phone]    Patient's PCP:Arnoldo Still NP  Referring Provider: Dr. Judy Loya    Subjective:      Chief Complaint:: Diabetic Foot Exam (Yearly foot exam)    YESSENIA Pandya is a 83 y.o. male who presents to the clinic for a diabetic foot exam.   Pt has seen Arnoldo Still NP on 05/12/2023 who treats them for their diabetes.  Pt has been a diabetic for over 20 years.  Taking metformin, novolog, and levimir to treat diabetes.      Blood Sugar: 156   Hemoglobin A1c: 9.3    Vitals:    06/27/23 0951   Pulse: 78   Resp: 16   SpO2: 98%   Weight: 88.9 kg (196 lb)   Height: 5' 6" (1.676 m)   PainSc: 0-No pain      Shoe Size: 8.5    Past Surgical History:   Procedure Laterality Date    CATARACT EXTRACTION Right     GLAUCOMA SURGERY Right      Past Medical History:   Diagnosis Date    Diabetes mellitus     Hypertension     TIA (transient ischemic attack)     Urinary retention      Family History   Family history unknown: Yes        Social History:   Marital Status:   Alcohol History:  reports that he does not currently use alcohol.  Tobacco History:  reports that he has never smoked. He has never used smokeless tobacco.  Drug History:  has no history on file for drug use.    Review of patient's allergies indicates:  No Known Allergies    Current Outpatient Medications   Medication Sig Dispense Refill    amLODIPine (NORVASC) 10 MG tablet Take 10 mg by mouth.      aspirin (ECOTRIN) 81 MG EC tablet Take 81 mg by mouth once daily.      atorvastatin (LIPITOR) 80 MG tablet Take 1 tablet (80 mg total) by mouth once daily. 90 tablet 1    BD ULTRA-FINE SHORT PEN NEEDLE 31 gauge x 5/16" Ndle Inject into the skin.      brimonidine 0.2% (ALPHAGAN) 0.2 % Drop Place 1 drop into both eyes every 12 (twelve) hours. 30 mL 6    ciclopirox (PENLAC) 8 % Soln Apply topically nightly. 6.6 mL 5    clopidogreL (PLAVIX) 75 mg tablet TAKE 1 " TABLET(75 MG) BY MOUTH EVERY DAY 90 tablet 1    clotrimazole-betamethasone 1-0.05% (LOTRISONE) cream Apply topically 2 (two) times daily. 45 g 3    dorzolamide-timolol 2-0.5% (COSOPT) 22.3-6.8 mg/mL ophthalmic solution Place 1 drop into both eyes every 12 (twelve) hours. 30 mL 6    finasteride (PROSCAR) 5 mg tablet Take 1 tablet (5 mg total) by mouth once daily. 90 tablet 3    gabapentin (NEURONTIN) 300 MG capsule Take 300 mg by mouth every evening.      insulin detemir U-100, Levemir, (LEVEMIR FLEXPEN) 100 unit/mL (3 mL) InPn pen Inject 15 Units into the skin every evening. 15 mL 6    lisinopriL (PRINIVIL,ZESTRIL) 20 MG tablet Take 20 mg by mouth once daily.      metFORMIN (GLUCOPHAGE-XR) 500 MG ER 24hr tablet TAKE 4 TABLETS BY MOUTH EVERY EVENING 120 tablet 0    NOVOLOG FLEXPEN U-100 INSULIN 100 unit/mL (3 mL) InPn pen SMARTSI Unit(s) SUB-Q 3 Times Daily 15 mL 11    tamsulosin (FLOMAX) 0.4 mg Cap Take 2 capsules (0.8 mg total) by mouth once daily. 180 capsule 3    travoprost (TRAVATAN Z) 0.004 % ophthalmic solution Place 1 drop into both eyes every evening. 7.5 mL 6    TRUE METRIX GLUCOSE TEST STRIP Strp 3 (three) times daily. Use to test blood glucose 100 each 11     No current facility-administered medications for this visit.       Review of Systems   Constitutional:  Negative for chills, fatigue, fever and unexpected weight change.   HENT:  Negative for hearing loss and trouble swallowing.    Eyes:  Negative for photophobia and visual disturbance.   Respiratory:  Negative for cough, shortness of breath and wheezing.    Cardiovascular:  Positive for leg swelling. Negative for chest pain and palpitations.   Gastrointestinal:  Negative for abdominal pain and nausea.   Genitourinary:  Negative for dysuria and frequency.   Musculoskeletal:  Negative for arthralgias, back pain, gait problem, joint swelling and myalgias.   Skin:  Negative for rash and wound.   Neurological:  Negative for tremors, seizures, speech  difficulty, weakness and headaches.   Hematological:  Does not bruise/bleed easily.       Objective:        Physical Exam:   Foot Exam    General  General Appearance: appears stated age and healthy   Orientation: alert and oriented to person, place, and time   Affect: appropriate   Gait: unimpaired       Right Foot/Ankle     Inspection and Palpation  Ecchymosis: none  Tenderness: none   Swelling: (Mild lower extremity edema)  Arch: normal  Hammertoes: second toe and third toe  Hallux limitus: yes  Skin Exam: dry skin and tinea; no drainage, no ulcer and no erythema   Fungus Toenails: present    Neurovascular  Dorsalis pedis: 1+  Posterior tibial: 1+  Capillary Refill: 2+  Varicose veins: not present  Saphenous nerve sensation: normal  Tibial nerve sensation: normal  Superficial peroneal nerve sensation: normal  Deep peroneal nerve sensation: normal  Sural nerve sensation: normal    Edema  Type of edema: non-pitting    Muscle Strength  Ankle dorsiflexion: 5  Ankle plantar flexion: 5  Ankle inversion: 5  Ankle eversion: 5  Great toe extension: 5  Great toe flexion: 5    Range of Motion    Normal right ankle ROM    Tests  Anterior drawer: negative   Talar tilt: negative   PT Tinel's sign: negative    Paresthesia: negative    Left Foot/Ankle      Inspection and Palpation  Ecchymosis: none  Tenderness: none   Swelling: (Mild lower extremity edema)  Arch: normal  Hammertoes: second toe and third toe  Hallux limitus: yes  Skin Exam: dry skin and tinea; no drainage, no ulcer and no erythema   Fungus Toenails: present    Neurovascular  Dorsalis pedis: 1+  Posterior tibial: 1+  Capillary refill: 2+  Varicose veins: not present  Saphenous nerve sensation: normal  Tibial nerve sensation: normal  Superficial peroneal nerve sensation: normal  Deep peroneal nerve sensation: normal  Sural nerve sensation: normal    Edema  Type of edema: non-pitting    Muscle Strength  Ankle dorsiflexion: 5  Ankle plantar flexion: 5  Ankle  inversion: 5  Ankle eversion: 5  Great toe extension: 5  Great toe flexion: 5    Range of Motion    Normal left ankle ROM    Tests  Anterior drawer: negative   Talar tilt: negative   PT Tinel's sign: negative  Paresthesia: negative    Physical Exam  Cardiovascular:      Pulses:           Dorsalis pedis pulses are 1+ on the right side and 1+ on the left side.        Posterior tibial pulses are 1+ on the right side and 1+ on the left side.   Feet:      Right foot:      Skin integrity: Dry skin present. No ulcer or erythema.      Toenail Condition: Fungal disease present.     Left foot:      Skin integrity: Dry skin present. No ulcer or erythema.      Toenail Condition: Fungal disease present.            Right Ankle/Foot Exam     Range of Motion   The patient has normal right ankle ROM.    Left Ankle/Foot Exam     Range of Motion   The patient has normal left ankle ROM.       Muscle Strength   Right Lower Extremity   Ankle Dorsiflexion:  5   Plantar flexion:  5/5  Left Lower Extremity   Ankle Dorsiflexion:  5   Plantar flexion:  5/5     Vascular Exam     Right Pulses  Dorsalis Pedis:      1+  Posterior Tibial:      1+        Left Pulses  Dorsalis Pedis:      1+  Posterior Tibial:      1+         Imaging: none            Assessment:       1. Type 2 diabetes mellitus with hyperglycemia, with long-term current use of insulin    2. Encounter for diabetic foot exam    3. Tinea pedis of both feet    4. Onychomycosis due to dermatophyte    5. Acquired hallux limitus of both feet    6. Hammer toes of both feet    7. Bilateral lower extremity edema      Plan:   Type 2 diabetes mellitus with hyperglycemia, with long-term current use of insulin  -      DIABETES FOOT EXAM    Encounter for diabetic foot exam  -      DIABETES FOOT EXAM    Tinea pedis of both feet  -     clotrimazole-betamethasone 1-0.05% (LOTRISONE) cream; Apply topically 2 (two) times daily.  Dispense: 45 g; Refill: 3    Onychomycosis due to dermatophyte  -      ciclopirox (PENLAC) 8 % Soln; Apply topically nightly.  Dispense: 6.6 mL; Refill: 5    Acquired hallux limitus of both feet    Hammer toes of both feet    Bilateral lower extremity edema      Follow up in 1 year (on 6/27/2024), or if symptoms worsen or fail to improve.    Procedures        Counseled patient on the aspects of diabetes and how it pertains to the feet.  I explained the importance of proper diabetic foot care and how it is essential for the health of their feet.    I discussed the importance of knowing their HGA1c and that the level needs to be as close to 6 as possible.  I discussed the increase complications of high blood sugar including stroke, blindness, heart attack, kidney failure and loss of limb secondary to neuropathy and PVD.    Patient  was made aware of inspecting their feet.  Patient was told to be aware of any breaks in the skin or redness.  With neuropathy, these areas are not recognized early due to the numbness.  I discussed different treatments available to control the symptoms but whcih may not cure the problem.      Shoe inspection. Patient instructed on proper foot hygeine. We discussed wearing proper shoe gear, daily foot inspections, never walking without protective shoe gear, never putting sharp instruments to feet.    Fungal infection of toenails explained. Treatment options including no treatment, periodic debridement, topical medications, oral medications, and removal of the nail were discussed, as well as success rates and risks of recurrence. We agreed on topical medication      Counseling:     I provided patient education verbally regarding:   Patient diagnosis, treatment options, as well as alternatives, risks, and benefits.     This note was created using Dragon voice recognition software that occasionally misinterpreted phrases or words.

## 2023-07-11 ENCOUNTER — LAB VISIT (OUTPATIENT)
Dept: LAB | Facility: HOSPITAL | Age: 83
End: 2023-07-11
Attending: NURSE PRACTITIONER
Payer: MEDICARE

## 2023-07-11 DIAGNOSIS — Z79.4 TYPE 2 DIABETES MELLITUS WITH HYPERGLYCEMIA, WITH LONG-TERM CURRENT USE OF INSULIN: ICD-10-CM

## 2023-07-11 DIAGNOSIS — E11.65 TYPE 2 DIABETES MELLITUS WITH HYPERGLYCEMIA, WITH LONG-TERM CURRENT USE OF INSULIN: ICD-10-CM

## 2023-07-11 LAB
ALBUMIN SERPL BCP-MCNC: 3.6 G/DL (ref 3.5–5.2)
ALP SERPL-CCNC: 65 U/L (ref 55–135)
ALT SERPL W/O P-5'-P-CCNC: 12 U/L (ref 10–44)
ANION GAP SERPL CALC-SCNC: 7 MMOL/L (ref 8–16)
AST SERPL-CCNC: 15 U/L (ref 10–40)
BILIRUB SERPL-MCNC: 0.6 MG/DL (ref 0.1–1)
BUN SERPL-MCNC: 18 MG/DL (ref 8–23)
CALCIUM SERPL-MCNC: 9.2 MG/DL (ref 8.7–10.5)
CHLORIDE SERPL-SCNC: 107 MMOL/L (ref 95–110)
CO2 SERPL-SCNC: 26 MMOL/L (ref 23–29)
CREAT SERPL-MCNC: 1.1 MG/DL (ref 0.5–1.4)
EST. GFR  (NO RACE VARIABLE): >60 ML/MIN/1.73 M^2
ESTIMATED AVG GLUCOSE: 137 MG/DL (ref 68–131)
GLUCOSE SERPL-MCNC: 148 MG/DL (ref 70–110)
HBA1C MFR BLD: 6.4 % (ref 4–5.6)
POTASSIUM SERPL-SCNC: 4.1 MMOL/L (ref 3.5–5.1)
PROT SERPL-MCNC: 7.3 G/DL (ref 6–8.4)
SODIUM SERPL-SCNC: 140 MMOL/L (ref 136–145)

## 2023-07-11 PROCEDURE — 80053 COMPREHEN METABOLIC PANEL: CPT | Performed by: NURSE PRACTITIONER

## 2023-07-11 PROCEDURE — 83036 HEMOGLOBIN GLYCOSYLATED A1C: CPT | Performed by: NURSE PRACTITIONER

## 2023-07-11 PROCEDURE — 36415 COLL VENOUS BLD VENIPUNCTURE: CPT | Mod: PO | Performed by: NURSE PRACTITIONER

## 2023-07-12 ENCOUNTER — PATIENT OUTREACH (OUTPATIENT)
Dept: ADMINISTRATIVE | Facility: HOSPITAL | Age: 83
End: 2023-07-12
Payer: MEDICARE

## 2023-07-12 NOTE — PROGRESS NOTES
Population Health Chart Review & Patient Outreach Details:     Reason for Outreach Encounter:     []  Non-Compliant Report   [x]  Payor Report (Hum[employer], PHN, BCBS, MSSP, MCIP, UHC, etc.)   []  Pre-Visit Chart Review     Updates Requested / Reviewed:     [x]  Care Everywhere    []     []  External Sources ([facility], [facility], DIS, etc.)   []  Care Team Updated    Patient Outreach Method:    [x]  Telephone Outreach Completed   [] Successful   [] Left Voicemail   [x] Unable to Contact (wrong number, no voicemail)  []  [employer] Portal Outreach Sent  []  Letter Outreach Mailed  []  Fax Sent for External Records  []  External Records Upload    Health Maintenance Topics Addressed and Outreach Outcomes / Actions Taken:        []      Breast Cancer Screening []  Mammo Scheduled      []  External Records Requested     []  Added Reminder to Complete to Upcoming Primary Care Appt Notes     []  Patient Declined     []  Patient Will Call Back to Schedule     []  Patient Will Schedule with External Provider / Order Routed if Applicable             []       Cervical Cancer Screening []  Pap Scheduled      []  External Records Requested     []  Added Reminder to Complete to Upcoming Primary Care Appt Notes     []  Patient Declined     []  Patient Will Call Back to Schedule     []  Patient Will Schedule with External Provider               []          Colorectal Cancer Screening []  Colonoscopy Case Request or Referral Placed     []  External Records Requested     []  Added Reminder to Complete to Upcoming Primary Care Appt Notes     []  Patient Declined     []  Patient Will Call Back to Schedule     []  Patient Will Schedule with External Provider     []  Fit Kit Mailed (add the SmartPhrase under additional notes)     []  Reminded Patient to Complete Home Test             []      Diabetic Eye Exam []  Eye Camera Scheduled or Optometry Referral Placed     []  External Records Requested     []  Added Reminder to Complete to  Upcoming Primary Care Appt Notes     []  Patient Declined     []  Patient Will Call Back to Schedule     []  Patient Will Schedule with External Provider             []      Blood Pressure Control []  Primary Care Follow Up Visit Scheduled     []  Remote Blood Pressure Reading Captured     []  Added Reminder to Complete to Upcoming Primary Care Appt Notes     []  Patient Declined     []  Patient Will Call Back / Patient Will Send Portal Message with Reading     []  Patient Will Call Back to Schedule Provider Visit             []       HbA1c & Other Labs []  Lab Appt Scheduled for Due Labs     []  Primary Care Follow Up Visit Scheduled      []  Reminded Patient to Complete Home Test     []  Added Reminder to Complete to Upcoming Primary Care Appt Notes     []  Patient Declined     []  Patient Will Call Back to Schedule     []  Patient Will Schedule with External Provider / Order Routed if Applicable           [x]    Schedule Primary Care Appt []  Primary Care Appt Scheduled     []  Patient Declined     []  Patient Will Call Back to Schedule     []  Pt Established with External Provider & Updated Care Team             []      Medication Adherence []  Primary Care Appointment Scheduled     []  Added Reminder to Upcoming Primary Care Appt Notes     []  Patient Reminded to  Prescription     []  Patient Declined, Provider Notified if Needed     []  Sent Provider Message to Review and/or Add Exclusion to Problem List             []      Osteoporosis Screening []  DXA Appointment Scheduled     []  External Records Requested     []  Added Reminder to Complete to Upcoming Primary Care Appt Notes     []  Patient Declined     []  Patient Will Call Back to Schedule     []  Patient Will Schedule with External Provider / Order Routed if Applicable     Additional Care Coordinator Notes:     Voicemail unable to receive messages.    Further Action Needed If Patient Returns Outreach:

## 2023-07-24 ENCOUNTER — OFFICE VISIT (OUTPATIENT)
Dept: OPHTHALMOLOGY | Facility: CLINIC | Age: 83
End: 2023-07-24
Payer: MEDICARE

## 2023-07-24 ENCOUNTER — CLINICAL SUPPORT (OUTPATIENT)
Dept: OPHTHALMOLOGY | Facility: CLINIC | Age: 83
End: 2023-07-24
Payer: MEDICARE

## 2023-07-24 DIAGNOSIS — H40.1113 PRIMARY OPEN-ANGLE GLAUCOMA, RIGHT EYE, SEVERE STAGE: Primary | ICD-10-CM

## 2023-07-24 DIAGNOSIS — H40.1122 PRIMARY OPEN-ANGLE GLAUCOMA, LEFT EYE, MODERATE STAGE: ICD-10-CM

## 2023-07-24 PROCEDURE — 1159F MED LIST DOCD IN RCRD: CPT | Mod: CPTII,S$GLB,, | Performed by: OPHTHALMOLOGY

## 2023-07-24 PROCEDURE — 3288F PR FALLS RISK ASSESSMENT DOCUMENTED: ICD-10-PCS | Mod: CPTII,S$GLB,, | Performed by: OPHTHALMOLOGY

## 2023-07-24 PROCEDURE — 99213 PR OFFICE/OUTPT VISIT, EST, LEVL III, 20-29 MIN: ICD-10-PCS | Mod: S$GLB,,, | Performed by: OPHTHALMOLOGY

## 2023-07-24 PROCEDURE — 1101F PT FALLS ASSESS-DOCD LE1/YR: CPT | Mod: CPTII,S$GLB,, | Performed by: OPHTHALMOLOGY

## 2023-07-24 PROCEDURE — 1160F RVW MEDS BY RX/DR IN RCRD: CPT | Mod: CPTII,S$GLB,, | Performed by: OPHTHALMOLOGY

## 2023-07-24 PROCEDURE — 1159F PR MEDICATION LIST DOCUMENTED IN MEDICAL RECORD: ICD-10-PCS | Mod: CPTII,S$GLB,, | Performed by: OPHTHALMOLOGY

## 2023-07-24 PROCEDURE — 99999 PR PBB SHADOW E&M-EST. PATIENT-LVL III: ICD-10-PCS | Mod: PBBFAC,,, | Performed by: OPHTHALMOLOGY

## 2023-07-24 PROCEDURE — 92020 PR SPECIAL EYE EVAL,GONIOSCOPY: ICD-10-PCS | Mod: S$GLB,,, | Performed by: OPHTHALMOLOGY

## 2023-07-24 PROCEDURE — 92083 EXTENDED VISUAL FIELD XM: CPT | Mod: S$GLB,,, | Performed by: OPHTHALMOLOGY

## 2023-07-24 PROCEDURE — 99999 PR PBB SHADOW E&M-EST. PATIENT-LVL III: CPT | Mod: PBBFAC,,, | Performed by: OPHTHALMOLOGY

## 2023-07-24 PROCEDURE — 1101F PR PT FALLS ASSESS DOC 0-1 FALLS W/OUT INJ PAST YR: ICD-10-PCS | Mod: CPTII,S$GLB,, | Performed by: OPHTHALMOLOGY

## 2023-07-24 PROCEDURE — 92083 HUMPHREY VISUAL FIELD - OU - BOTH EYES: ICD-10-PCS | Mod: S$GLB,,, | Performed by: OPHTHALMOLOGY

## 2023-07-24 PROCEDURE — 99213 OFFICE O/P EST LOW 20 MIN: CPT | Mod: S$GLB,,, | Performed by: OPHTHALMOLOGY

## 2023-07-24 PROCEDURE — 92020 GONIOSCOPY: CPT | Mod: S$GLB,,, | Performed by: OPHTHALMOLOGY

## 2023-07-24 PROCEDURE — 1126F PR PAIN SEVERITY QUANTIFIED, NO PAIN PRESENT: ICD-10-PCS | Mod: CPTII,S$GLB,, | Performed by: OPHTHALMOLOGY

## 2023-07-24 PROCEDURE — 3288F FALL RISK ASSESSMENT DOCD: CPT | Mod: CPTII,S$GLB,, | Performed by: OPHTHALMOLOGY

## 2023-07-24 PROCEDURE — 1160F PR REVIEW ALL MEDS BY PRESCRIBER/CLIN PHARMACIST DOCUMENTED: ICD-10-PCS | Mod: CPTII,S$GLB,, | Performed by: OPHTHALMOLOGY

## 2023-07-24 PROCEDURE — 1126F AMNT PAIN NOTED NONE PRSNT: CPT | Mod: CPTII,S$GLB,, | Performed by: OPHTHALMOLOGY

## 2023-07-24 NOTE — PROGRESS NOTES
HPI    DLS: 3/21/2023- rev HVF / gonio     Pt states sometimes vision is blurry. DM is up and down sometimes. Denies   pain/ FOL/ floaters.       Brimonidine OU BID   Dorzolamide/ timolol OU BID   Travatan OU QHS   Last edited by Yenifer Haider on 7/24/2023  2:29 PM.            Assessment /Plan     For exam results, see Encounter Report.    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

## 2023-07-24 NOTE — PROGRESS NOTES
24-2 HVF done OS only.         Assessment /Plan     For exam results, see Encounter Report.    There are no diagnoses linked to this encounter.

## 2023-10-05 ENCOUNTER — OFFICE VISIT (OUTPATIENT)
Dept: UROLOGY | Facility: CLINIC | Age: 83
End: 2023-10-05
Payer: MEDICARE

## 2023-10-05 VITALS
SYSTOLIC BLOOD PRESSURE: 149 MMHG | HEART RATE: 94 BPM | DIASTOLIC BLOOD PRESSURE: 91 MMHG | WEIGHT: 195 LBS | HEIGHT: 66 IN | BODY MASS INDEX: 31.34 KG/M2

## 2023-10-05 DIAGNOSIS — N40.0 BENIGN PROSTATIC HYPERPLASIA, UNSPECIFIED WHETHER LOWER URINARY TRACT SYMPTOMS PRESENT: Primary | ICD-10-CM

## 2023-10-05 DIAGNOSIS — R39.15 URINARY URGENCY: ICD-10-CM

## 2023-10-05 LAB
BILIRUBIN, UA POC OHS: NEGATIVE
BLOOD, UA POC OHS: NEGATIVE
CLARITY, UA POC OHS: CLEAR
COLOR, UA POC OHS: YELLOW
GLUCOSE, UA POC OHS: 250
KETONES, UA POC OHS: NEGATIVE
LEUKOCYTES, UA POC OHS: NEGATIVE
NITRITE, UA POC OHS: NEGATIVE
PH, UA POC OHS: 6
POC RESIDUAL URINE VOLUME: 4 ML (ref 0–100)
PROTEIN, UA POC OHS: 30
SPECIFIC GRAVITY, UA POC OHS: 1.02
UROBILINOGEN, UA POC OHS: 0.2

## 2023-10-05 PROCEDURE — 99999 PR PBB SHADOW E&M-EST. PATIENT-LVL III: ICD-10-PCS | Mod: PBBFAC,,, | Performed by: UROLOGY

## 2023-10-05 PROCEDURE — 51798 US URINE CAPACITY MEASURE: CPT | Mod: S$GLB,,, | Performed by: UROLOGY

## 2023-10-05 PROCEDURE — 1101F PT FALLS ASSESS-DOCD LE1/YR: CPT | Mod: CPTII,S$GLB,, | Performed by: UROLOGY

## 2023-10-05 PROCEDURE — 1126F PR PAIN SEVERITY QUANTIFIED, NO PAIN PRESENT: ICD-10-PCS | Mod: CPTII,S$GLB,, | Performed by: UROLOGY

## 2023-10-05 PROCEDURE — 81003 URINALYSIS AUTO W/O SCOPE: CPT | Mod: QW,S$GLB,, | Performed by: UROLOGY

## 2023-10-05 PROCEDURE — 1126F AMNT PAIN NOTED NONE PRSNT: CPT | Mod: CPTII,S$GLB,, | Performed by: UROLOGY

## 2023-10-05 PROCEDURE — 1159F MED LIST DOCD IN RCRD: CPT | Mod: CPTII,S$GLB,, | Performed by: UROLOGY

## 2023-10-05 PROCEDURE — 81003 POCT URINALYSIS(INSTRUMENT): ICD-10-PCS | Mod: QW,S$GLB,, | Performed by: UROLOGY

## 2023-10-05 PROCEDURE — 51798 POCT BLADDER SCAN: ICD-10-PCS | Mod: S$GLB,,, | Performed by: UROLOGY

## 2023-10-05 PROCEDURE — 1101F PR PT FALLS ASSESS DOC 0-1 FALLS W/OUT INJ PAST YR: ICD-10-PCS | Mod: CPTII,S$GLB,, | Performed by: UROLOGY

## 2023-10-05 PROCEDURE — 1160F PR REVIEW ALL MEDS BY PRESCRIBER/CLIN PHARMACIST DOCUMENTED: ICD-10-PCS | Mod: CPTII,S$GLB,, | Performed by: UROLOGY

## 2023-10-05 PROCEDURE — 1159F PR MEDICATION LIST DOCUMENTED IN MEDICAL RECORD: ICD-10-PCS | Mod: CPTII,S$GLB,, | Performed by: UROLOGY

## 2023-10-05 PROCEDURE — 3288F FALL RISK ASSESSMENT DOCD: CPT | Mod: CPTII,S$GLB,, | Performed by: UROLOGY

## 2023-10-05 PROCEDURE — 3077F PR MOST RECENT SYSTOLIC BLOOD PRESSURE >= 140 MM HG: ICD-10-PCS | Mod: CPTII,S$GLB,, | Performed by: UROLOGY

## 2023-10-05 PROCEDURE — 3080F PR MOST RECENT DIASTOLIC BLOOD PRESSURE >= 90 MM HG: ICD-10-PCS | Mod: CPTII,S$GLB,, | Performed by: UROLOGY

## 2023-10-05 PROCEDURE — 3288F PR FALLS RISK ASSESSMENT DOCUMENTED: ICD-10-PCS | Mod: CPTII,S$GLB,, | Performed by: UROLOGY

## 2023-10-05 PROCEDURE — 3080F DIAST BP >= 90 MM HG: CPT | Mod: CPTII,S$GLB,, | Performed by: UROLOGY

## 2023-10-05 PROCEDURE — 1160F RVW MEDS BY RX/DR IN RCRD: CPT | Mod: CPTII,S$GLB,, | Performed by: UROLOGY

## 2023-10-05 PROCEDURE — 99214 OFFICE O/P EST MOD 30 MIN: CPT | Mod: S$GLB,,, | Performed by: UROLOGY

## 2023-10-05 PROCEDURE — 3077F SYST BP >= 140 MM HG: CPT | Mod: CPTII,S$GLB,, | Performed by: UROLOGY

## 2023-10-05 PROCEDURE — 99214 PR OFFICE/OUTPT VISIT, EST, LEVL IV, 30-39 MIN: ICD-10-PCS | Mod: S$GLB,,, | Performed by: UROLOGY

## 2023-10-05 PROCEDURE — 99999 PR PBB SHADOW E&M-EST. PATIENT-LVL III: CPT | Mod: PBBFAC,,, | Performed by: UROLOGY

## 2023-10-05 RX ORDER — LANCETS 33 GAUGE
EACH MISCELLANEOUS
COMMUNITY
Start: 2023-09-15

## 2023-10-05 RX ORDER — TAMSULOSIN HYDROCHLORIDE 0.4 MG/1
0.4 CAPSULE ORAL NIGHTLY
Qty: 90 CAPSULE | Refills: 1 | Status: SHIPPED | OUTPATIENT
Start: 2023-10-05 | End: 2024-01-08 | Stop reason: SDUPTHER

## 2023-10-05 NOTE — PROGRESS NOTES
[facility] Note - [place]  Staff: MD Nathan  PCP: Arnoldo Still NP  Date of Service: 10/05/2023      Subjective:        HPI: Evelio Pandya is a 83 y.o. male     Initial consult by me in clinic on 10/5/23 for oab:  He is here with his daughter [place].  Was seen by us/Urology nurse practitioner in September after he had a TIA which resulted in temporary loss of vision.  He was 1st diagnosed with urinary retention in the ER.  However unclear how much actually drained.  Then he saw us in clinic on 09/26/2022.  His Archer had already been removed that day before because he had some blood in his Archer.  When he came to the clinic on 09/26/2022 his PVR was 229 Na was urinating frequently.  He was started on Flomax.  He returned the following day on 09/27/2022 and his Flomax has been increased in his PVR Was down to 13.  Still urinating frequently.    He returned again on November 7th and is PVR was 101.  Taking Flomax and finasteride was discussed.  Today he states that he is urinating every 1-3 hours.  Denies any significant frequency and says it is actually improved since a few months ago.  However does have urgency but without urge incontinence.  Wakes up about 3 times a night.  Denies any slow stream but having some hesistancy off Flomax 0.8 (which he ran out of 2 weeks ago).    Does drink 2-3 caffeinated drinks a day.  Denies any constipation.  Last A1c in July was normal 6.4.  No new diabetes med.  Daughter does not think he is taking his diabetes medications like.  Daughter wanted to bring him for fu  Ua: 250 gluc    Urine history: family history of kidney, bladder or prostate cancer:No, personal or family history of kidney stones: No,tobacco use: Yes - , anticoagulation: No  10/5/23 250 gluc/30 prot    PSA History: no fam hx of prostate cancer  10/5/23 Joseph: 20g      REVIEW OF SYSTEMS:  Negative except for as stated above    Past Medical History:   Diagnosis Date    Diabetes  mellitus     Hypertension     TIA (transient ischemic attack)     Urinary retention        Past Surgical History:   Procedure Laterality Date    CATARACT EXTRACTION Right     GLAUCOMA SURGERY Right          Objective:     Vitals:    10/05/23 1043   BP: (!) 149/91   Pulse: 94         Assessment:     Evelio Pandya is a 83 y.o. male with       1. Benign prostatic hyperplasia, unspecified whether lower urinary tract symptoms present    2. Urinary urgency        Plan:     I think that is overactive bladder was likely related to his acute stroke that he had.  It is since resolved I think his symptoms resolved on their own.  Rectal exam today reveals a small prostate.  He does state that he has some urinary hesitancy so he can restart his Flomax but just take 1 a night.  If he finds that it helps with the hesitancy then he can continue it but if he does not feel like it is helping with hesitancy he can stay off of it.    Also having some urgency which is the urge to urinate and feeling like he is not going to have enough time.  However no frequency.  First should stop drinking so much caffeine and would also consider an overactive bladder medication if he stops taking the caffeine was still having urgency.  Otherwise I would avoid especially since he does not even like taking medications and they have side effects.    Also stressed the importance of taking his diabetes and blood pressure medications     Dc finasteride - doesn't need. Used to shrink prostate.     Fu in 3 months with NP aua ssx, pvr, sx check-- see if urgency worsening. If he needs oab med would do myrbetriq but watch pressure        Ludy Evans MD

## 2023-10-05 NOTE — PATIENT INSTRUCTIONS
Evelio Pandya is a 83 y.o. male with       1. Benign prostatic hyperplasia, unspecified whether lower urinary tract symptoms present    2. Urinary urgency        Plan:     I think that is overactive bladder was likely related to his acute stroke that he had.  It is since resolved I think his symptoms resolved on their own.  Rectal exam today reveals a small prostate.  He does state that he has some urinary hesitancy so he can restart his Flomax but just take 1 a night.  If he finds that it helps with the hesitancy then he can continue it but if he does not feel like it is helping with hesitancy he can stay off of it.    Also having some urgency which is the urge to urinate and feeling like he is not going to have enough time.  However no frequency.  First should stop drinking so much caffeine and would also consider an overactive bladder medication if he stops taking the caffeine was still having urgency.  Otherwise I would avoid especially since he does not even like taking medications and they have side effects.    Also stressed the importance of taking his diabetes and blood pressure medications     Evelio Pandya is a 83 y.o. male with       1. Benign prostatic hyperplasia, unspecified whether lower urinary tract symptoms present    2. Urinary urgency        Plan:     I think that is overactive bladder was likely related to his acute stroke that he had.  It is since resolved I think his symptoms resolved on their own.  Rectal exam today reveals a small prostate.  He does state that he has some urinary hesitancy so he can restart his Flomax but just take 1 a night.  If he finds that it helps with the hesitancy then he can continue it but if he does not feel like it is helping with hesitancy he can stay off of it.    Also having some urgency which is the urge to urinate and feeling like he is not going to have enough time.  However no frequency.  First should stop drinking so much caffeine and would also consider  an overactive bladder medication if he stops taking the caffeine was still having urgency.  Otherwise I would avoid especially since he does not even like taking medications and they have side effects.    Also stressed the importance of taking his diabetes and blood pressure medications     Dc finasteride - doesn't need. Used to shrink prostate.     Fu in 3 months with NP aua ssx, pvr, sx check-- see if urgency worsening. If he needs oab med would do myrbetriq but watch pressure    Fu in 3 months with aua ssx, pvr, sx check-- see if urgency worsening. If he needs oab med would do myrbetriq but watch pressure

## 2023-10-11 ENCOUNTER — OFFICE VISIT (OUTPATIENT)
Dept: FAMILY MEDICINE | Facility: CLINIC | Age: 83
End: 2023-10-11
Payer: MEDICARE

## 2023-10-11 ENCOUNTER — CLINICAL SUPPORT (OUTPATIENT)
Dept: DIABETES | Facility: CLINIC | Age: 83
End: 2023-10-11
Payer: MEDICARE

## 2023-10-11 ENCOUNTER — TELEPHONE (OUTPATIENT)
Dept: FAMILY MEDICINE | Facility: CLINIC | Age: 83
End: 2023-10-11
Payer: MEDICARE

## 2023-10-11 ENCOUNTER — LAB VISIT (OUTPATIENT)
Dept: LAB | Facility: HOSPITAL | Age: 83
End: 2023-10-11
Attending: NURSE PRACTITIONER
Payer: MEDICARE

## 2023-10-11 VITALS
OXYGEN SATURATION: 98 % | BODY MASS INDEX: 31.36 KG/M2 | HEIGHT: 66 IN | HEART RATE: 90 BPM | WEIGHT: 195.13 LBS | DIASTOLIC BLOOD PRESSURE: 64 MMHG | SYSTOLIC BLOOD PRESSURE: 138 MMHG

## 2023-10-11 VITALS — HEIGHT: 66 IN | BODY MASS INDEX: 31.36 KG/M2 | WEIGHT: 195.13 LBS

## 2023-10-11 DIAGNOSIS — E11.65 TYPE 2 DIABETES MELLITUS WITH HYPERGLYCEMIA, WITH LONG-TERM CURRENT USE OF INSULIN: ICD-10-CM

## 2023-10-11 DIAGNOSIS — Z79.4 TYPE 2 DIABETES MELLITUS WITH HYPERGLYCEMIA, WITH LONG-TERM CURRENT USE OF INSULIN: Primary | ICD-10-CM

## 2023-10-11 DIAGNOSIS — Z79.4 TYPE 2 DIABETES MELLITUS WITH HYPERGLYCEMIA, WITH LONG-TERM CURRENT USE OF INSULIN: ICD-10-CM

## 2023-10-11 DIAGNOSIS — E11.65 TYPE 2 DIABETES MELLITUS WITH HYPERGLYCEMIA, WITH LONG-TERM CURRENT USE OF INSULIN: Primary | ICD-10-CM

## 2023-10-11 DIAGNOSIS — I10 HYPERTENSION, UNSPECIFIED TYPE: ICD-10-CM

## 2023-10-11 LAB
BACTERIA #/AREA URNS AUTO: ABNORMAL /HPF
BILIRUB UR QL STRIP: NEGATIVE
CLARITY UR REFRACT.AUTO: CLEAR
COLOR UR AUTO: YELLOW
GLUCOSE UR QL STRIP: ABNORMAL
HGB UR QL STRIP: NEGATIVE
HYALINE CASTS UR QL AUTO: 8 /LPF
KETONES UR QL STRIP: NEGATIVE
LEUKOCYTE ESTERASE UR QL STRIP: NEGATIVE
MICROSCOPIC COMMENT: ABNORMAL
NITRITE UR QL STRIP: NEGATIVE
PH UR STRIP: 6 [PH] (ref 5–8)
PROT UR QL STRIP: ABNORMAL
RBC #/AREA URNS AUTO: 1 /HPF (ref 0–4)
SP GR UR STRIP: 1.02 (ref 1–1.03)
SQUAMOUS #/AREA URNS AUTO: 0 /HPF
URN SPEC COLLECT METH UR: ABNORMAL
WBC #/AREA URNS AUTO: 1 /HPF (ref 0–5)

## 2023-10-11 PROCEDURE — G0108 DIAB MANAGE TRN  PER INDIV: HCPCS | Mod: S$GLB,,, | Performed by: DIETITIAN, REGISTERED

## 2023-10-11 PROCEDURE — 3288F PR FALLS RISK ASSESSMENT DOCUMENTED: ICD-10-PCS | Mod: CPTII,S$GLB,, | Performed by: NURSE PRACTITIONER

## 2023-10-11 PROCEDURE — 1126F AMNT PAIN NOTED NONE PRSNT: CPT | Mod: CPTII,S$GLB,, | Performed by: NURSE PRACTITIONER

## 2023-10-11 PROCEDURE — 1101F PR PT FALLS ASSESS DOC 0-1 FALLS W/OUT INJ PAST YR: ICD-10-PCS | Mod: CPTII,S$GLB,, | Performed by: NURSE PRACTITIONER

## 2023-10-11 PROCEDURE — 81001 URINALYSIS AUTO W/SCOPE: CPT | Performed by: NURSE PRACTITIONER

## 2023-10-11 PROCEDURE — 3078F DIAST BP <80 MM HG: CPT | Mod: CPTII,S$GLB,, | Performed by: NURSE PRACTITIONER

## 2023-10-11 PROCEDURE — 99999 PR PBB SHADOW E&M-EST. PATIENT-LVL III: ICD-10-PCS | Mod: PBBFAC,,, | Performed by: NURSE PRACTITIONER

## 2023-10-11 PROCEDURE — 1159F PR MEDICATION LIST DOCUMENTED IN MEDICAL RECORD: ICD-10-PCS | Mod: CPTII,S$GLB,, | Performed by: NURSE PRACTITIONER

## 2023-10-11 PROCEDURE — 99214 OFFICE O/P EST MOD 30 MIN: CPT | Mod: S$GLB,,, | Performed by: NURSE PRACTITIONER

## 2023-10-11 PROCEDURE — G0108 PR DIAB MANAGE TRN  PER INDIV: ICD-10-PCS | Mod: S$GLB,,, | Performed by: DIETITIAN, REGISTERED

## 2023-10-11 PROCEDURE — 99999 PR PBB SHADOW E&M-EST. PATIENT-LVL II: ICD-10-PCS | Mod: PBBFAC,,, | Performed by: DIETITIAN, REGISTERED

## 2023-10-11 PROCEDURE — 99214 PR OFFICE/OUTPT VISIT, EST, LEVL IV, 30-39 MIN: ICD-10-PCS | Mod: S$GLB,,, | Performed by: NURSE PRACTITIONER

## 2023-10-11 PROCEDURE — 3288F FALL RISK ASSESSMENT DOCD: CPT | Mod: CPTII,S$GLB,, | Performed by: NURSE PRACTITIONER

## 2023-10-11 PROCEDURE — 99999 PR PBB SHADOW E&M-EST. PATIENT-LVL II: CPT | Mod: PBBFAC,,, | Performed by: DIETITIAN, REGISTERED

## 2023-10-11 PROCEDURE — 1101F PT FALLS ASSESS-DOCD LE1/YR: CPT | Mod: CPTII,S$GLB,, | Performed by: NURSE PRACTITIONER

## 2023-10-11 PROCEDURE — 1159F MED LIST DOCD IN RCRD: CPT | Mod: CPTII,S$GLB,, | Performed by: NURSE PRACTITIONER

## 2023-10-11 PROCEDURE — 1126F PR PAIN SEVERITY QUANTIFIED, NO PAIN PRESENT: ICD-10-PCS | Mod: CPTII,S$GLB,, | Performed by: NURSE PRACTITIONER

## 2023-10-11 PROCEDURE — 99999 PR PBB SHADOW E&M-EST. PATIENT-LVL III: CPT | Mod: PBBFAC,,, | Performed by: NURSE PRACTITIONER

## 2023-10-11 PROCEDURE — 3075F SYST BP GE 130 - 139MM HG: CPT | Mod: CPTII,S$GLB,, | Performed by: NURSE PRACTITIONER

## 2023-10-11 PROCEDURE — 3075F PR MOST RECENT SYSTOLIC BLOOD PRESS GE 130-139MM HG: ICD-10-PCS | Mod: CPTII,S$GLB,, | Performed by: NURSE PRACTITIONER

## 2023-10-11 PROCEDURE — 3078F PR MOST RECENT DIASTOLIC BLOOD PRESSURE < 80 MM HG: ICD-10-PCS | Mod: CPTII,S$GLB,, | Performed by: NURSE PRACTITIONER

## 2023-10-11 RX ORDER — PEN NEEDLE, DIABETIC 31 GX5/16"
NEEDLE, DISPOSABLE MISCELLANEOUS
Qty: 120 EACH | Refills: 11 | Status: SHIPPED | OUTPATIENT
Start: 2023-10-11

## 2023-10-11 RX ORDER — CETIRIZINE HYDROCHLORIDE 10 MG/1
10 TABLET ORAL DAILY
Qty: 30 TABLET | Refills: 0 | Status: SHIPPED | OUTPATIENT
Start: 2023-10-11 | End: 2024-02-08 | Stop reason: ALTCHOICE

## 2023-10-11 NOTE — PROGRESS NOTES
"Diabetes Care Specialist Progress Note  Author: Corina Tena RD  Date: 10/11/2023    Program Intake  Reason for Diabetes Program Visit:: Initial Diabetes Assessment  Permission to speak with others about care:: yes ([place])    Lab Results   Component Value Date    HGBA1C 6.4 (H) 07/11/2023       Clinical  Weight: 88.5 kg (195 lb 1.7 oz)   Height: 5' 6" (167.6 cm)   Body mass index is 31.49 kg/m².    Problem Review  Reviewed Problem List with Patient: yes  Active comorbidities affecting diabetes self-care.: yes  Comorbidities: Hypertension  Reviewed health maintenance: yes    Clinical Assessment  Current Diabetes Treatment: Insulin (20 units Novolog TID, 15 units Levemir.  Patient has been living with girlfriend and he stopped taking medication.  Now living with Daughter [place] that is having him follow a balanced plan using Plate Method and less salt. Restarting medications.)  Have you ever experienced hypoglycemia (low blood sugar)?: no  Have you ever experienced hyperglycemia (high blood sugar)?: yes (Was not checking BG while living with his girlfriend)    Medication Information  How do you obtain your medications?: Family picks up  How many days a week do you miss your medications?: 3 or more  Do you use a pill box or medication chart to help you manage your medications?: No  Do you sometimes have difficulty refilling your medications?: No  Medication adherence impacting ability to self-manage diabetes?: Yes    Labs  Do you have regular lab work to monitor your medications?: Yes  Type of Regular Lab Work: A1c  Where do you get your labs drawn?: [facility]  Lab Compliance Barriers: No    Nutritional Status  Diet: Regular  Meal Plan 24 Hour Recall: Breakfast, Lunch, Dinner, Snack  Meal Plan 24 Hour Recall - Breakfast: 2 packets of instant grits, eggs, sausage  Meal Plan 24 Hour Recall - Lunch: turkey sandwich  Meal Plan 24 Hour Recall - Dinner: red beans, a little rice, cornbread. Had fried chicken seperate " from dinner.  Meal Plan 24 Hour Recall - Snack: grapes, no sugar added Outshine bar  Change in appetite?: No  Dentation:: Wears Dentures  Recent Changes in Weight: No Recent Weight Change  Current nutritional status an area of need that is impacting patient's ability to self-manage diabetes?: No    Additional Social History  Support  Does anyone support you with your diabetes care?: yes  Who supports you?: self, son/daughter (Oseas Parsons)  Who takes you to your medical appointments?: son/daughter  Does the current support meet the patient's needs?: Yes  Is Support an area impacting ability to self-manage diabetes?: No    Access to Mass Media & Technology  Does the patient have access to any of the following devices or technologies?: Smart phone  Media or technology needs impacting ability to self-manage diabetes?: No    Cognitive/Behavioral Health  Alert and Oriented: Yes  Difficulty Thinking: No  Requires Prompting: No  Requires assistance for routine expression?: No  Cognitive or behavioral barriers impacting ability to self-manage diabetes?: No    Culture/Spiritism  Culture or Hindu beliefs that may impact ability to access healthcare: No    Communication  Language preference: English  Hearing Problems: No  Vision Problems: No  Communication needs impacting ability to self-manage diabetes?: No    Health Literacy  Preferred Learning Method: Face to Face, Demonstration, Hands On, Reading Materials  How often do you need to have someone help you read instructions, pamphlets, or written material from your doctor or pharmacy?: Never  Health literacy needs impacting ability to self-manage diabetes?: No    Diabetes Self-Management Skills Assessment    Diabetes Disease Process/Treatment Options  Patient/caregiver able to state what happens when someone has diabetes.: yes  Patient/caregiver knows what type of diabetes they have.: yes  Diabetes Type : Type II  Patient/caregiver able to identify at least three signs  and symptoms of diabetes.: yes  Identified signs and symptoms:: increased thirst, frequent infections, fatigue  Patient able to identify at least three risk factors for diabetes.: yes  Identified risk factors:: family history, ethnicity, age over 40  Diabetes Disease Process/Treatment Options: Skills Assessment Completed: Yes  Assessment indicates:: Adequate understanding  Area of need?: No    Nutrition/Healthy Eating  Challenges to healthy eating:: snacking between meals and at night, portion control, lack of will power, eating out, going to parties  Method of carbohydrate measurement:: plate method  Patient can identify foods that impact blood sugar.: yes  Patient-identified foods:: fruit/fruit juice, starches (bread, pasta, rice, cereal), soda, sweets  Nutrition/Healthy Eating Skills Assessment Completed:: Yes  Assessment indicates:: Instruction Needed  Area of need?: Yes    Physical Activity/Exercise  Physical Activity/Exercise Skills Assessment Completed: : No  Deffered due to:: Time    Medications  Patient is able to describe current diabetes management routine.: no  Patient is able to identify current diabetes medications, dosages, and appropriate timing of medications.: no  Patient understands the purpose of the medications taken for diabetes.: yes  Patient reports problems or concerns with current medication regimen.: no  Medication Skills Assessment Completed:: Yes  Assessment indicates:: Instruction Needed, Knowledge deficit  Area of need?: Yes    Home Blood Glucose Monitoring  Patient states that blood sugar is checked at home daily.: no  Reasons for not monitoring:: other (see comments) (stopped monitoring BG)  Home Blood Glucose Monitoring Skills Assessment Completed: : Yes  Assessment indicates:: Adequate understanding, Instruction Needed  Area of need?: Yes    Acute Complications  Acute Complications Skills Assessment Completed: : No  Deffered due to:: Time    Chronic Complications  Chronic  Complications Skills Assessment Completed: : No  Deferred due to:: Time    Psychosocial/Coping  Patient can identify ways of coping with chronic disease.: yes  Patient-stated ways of coping with chronic disease:: support from loved ones  Psychosocial/Coping Skills Assessment Completed: : Yes  Assessment indicates:: Adequate understanding  Area of need?: No      Assessment Summary and Plan    Based on today's diabetes care assessment, the following areas of need were identified:          10/11/2023    12:08 AM   Social   Support No   Access to Mass Media/Tech No   Cognitive/Behavioral Health No   Culture/Jew No   Communication No   Health Literacy No            10/11/2023    12:08 AM   Clinical   Medication Adherence Yes, see care plan.  Patient has recently moved home to live with daughter because patient stopped all medications and diabetes diet when he lived with his girlfriend.   Lab Compliance No   Nutritional Status No            10/11/2023    12:08 AM   Diabetes Self-Management Skills   Diabetes Disease Process/Treatment Options No   Nutrition/Healthy Eating Yes, see care plan   Medication Yes, see care plan   Home Blood Glucose Monitoring Yes, see care plan   Psychosocial/Coping No          Today's interventions were provided through individual discussion, instruction, and written materials were provided.      Patient verbalized understanding of instruction and written materials.  Pt was able to return back demonstration of instructions today. Patient understood key points, needs reinforcement and further instruction.     Diabetes Self-Management Care Plan:    Today's Diabetes Self-Management Care Plan was developed with Evelio's input. Evelio has agreed to work toward the following goal(s) to improve his/her overall diabetes control.      Care Plan: Diabetes Management   Updates made since 9/11/2023 12:00 AM        Problem: Blood Glucose Self-Monitoring         Goal: Patient agrees to check and record  blood sugars TID before meals.    Start Date: 10/11/2023   Expected End Date: 11/20/2023   Priority: Low   Barriers: No Barriers Identified        Task: Provided patient with a meter today and sent Rx request to provider to send to patients pharmacy.         Task: Reviewed the importance of self-monitoring blood glucose and keeping logs. Completed 10/11/2023        Task: Instructed on how to self-monitor blood glucose using a home glucometer, how to properly dispose of used strips and lancets after use, and how to appropriately store meter and supplies.         Task: Provided patient with blood glucose logs, reviewed appropriate timing and frequency to SMBG, education on parameters on when to notify provider and advised patient to bring logs to all appts with PCP/Endocrinologist/Diabetes Care Specialist. Completed 10/11/2023        Task: Discussed ways to minimize pain when monitoring blood glucose.         Problem: Healthy Eating         Goal: Eat 3 meals daily with 45-60g/3-4 servings of Carbohydrate per meal or follow the Plate Method.    Start Date: 10/11/2023   Expected End Date: 11/20/2023   Priority: Medium   Barriers: Lack of Motivation to Change   Note:    Explained the Plate Method which is what his daughter roughly has him on currently. Provided the list of carbohydrates servings and explained they can choose any 3-4 servings from the list paired with protein.  Recommended fruit be at meals.  Discussed options of SF, diet, and zero drinks in addition to the water and lemon water he has been drinking lately.  He enjoys drinking milk. Explained it needs to be counting in 8 oz per serving.  He previously drank lots of SSB and juice.   Previously lived with girlfriend that fed him lots of carbohydrate heavy meals, snacks, and SSBs.  Daughter has taken him home to live with her again and has drastically changed his diet.  Daughter will make sure he adheres to diet for diabetes and lower sodium to manage HTN.  Hx of TIA.  Taught daughter how to properly read labels so she can incorporate foods they both enjoy that are not necessarily on the list.  Ex: sherbet.       Task: Reviewed the sources and role of Carbohydrate, Protein, and Fat and how each nutrient impacts blood sugar. Completed 10/11/2023        Task: Provided visual examples using dry measuring cups, food models, and other familiar objects such as computer mouse, deck or cards, tennis ball etc. to help with visualization of portions. Completed 10/11/2023        Task: Explained how to count carbohydrates using the food label and the use of dry measuring cups for accurate carb counting. Completed 10/11/2023        Task: Discussed strategies for choosing healthier menu options when dining out.         Task: Recommended replacing beverages containing high sugar content with noncaloric/sugar free options and/or water. Completed 10/11/2023        Task: Review the importance of balancing carbohydrates with each meal using portion control techniques to count servings of carbohydrate and label reading to identify serving size and amount of total carbs per serving.         Task: Provided Sample plate method and reviewed the use of the plate to estimate amounts of carbohydrate per meal. Completed 10/11/2023        Problem: Medications         Goal: Patient Agrees to take Diabetes Medications as prescribed.    Priority: High   Note:    Patient seen today by Harinder Still NP who recently moved back in with his daughter.  She has changed his diet drastically back to the way it was previously when his BG was well controlled.  We have one reading of 191 mg/dl.  Reviewed with SAMRA Still NP what he has been eating.  Per Harinder Still patient is to administer 8 units of Novolog TID AC.  Patient is to administer 15 units Levemir at night.            Stressed the need to have carbohydrates at each meal to take Novolog to prevent low. Taught how to treat a low BG under 70 mg/dl.   Provided written education.       Task: Reviewed with patient all current diabetes medications and provided basic review of the purpose, dosage, frequency, side effects, and storage of both oral and injectable diabetes medications. Completed 10/11/2023        Task: Reviewed possible resources for acquiring cost prohibitive medication.         Task: Instructed patient on how to self-administer         Task: Discussed guidelines for preventing, detecting and treating hypoglycemia and hyperglycemia and reviewed the importance of meal and medication timing with diabetes mediations for prevention of hypoglycemia and maximum drug benefit. Completed 10/11/2023          Follow Up Plan     Follow up in about 1 month (around 11/11/2023). Follow up with SAMRA Still NP on 1/17/23.  Patient seen today by Harinder Still NP recently moved back in with his daughter.  She has changed his diet drastically back to the way it was previously when his BG was well controlled.  We have one reading of 191 mg/dl.  Reviewed with SAMRA Still NP what he has been eating.  Per Harinder Still patient is to administer 8 units of Novolog TID AC.  Patient is to administer 15 units Levemir at night. Stressed the need to have carbohydrates at each meal to take Novolog to prevent low. Taught how to treat a low BG under 70 mg/dl.  Taught Plate Method.    Today's care plan and follow up schedule was discussed with patient.  Evelio verbalized understanding of the care plan, goals, and agrees to follow up plan.        The patient was encouraged to communicate with his/her health care provider/physician and care team regarding his/her condition(s) and treatment.  I provided the patient with my contact information today and encouraged to contact me via phone or [facility] Portal as needed.     Length of Visit   Total Time: 80 Minutes

## 2023-10-11 NOTE — TELEPHONE ENCOUNTER
"----- Message from Leti Fung sent at 10/10/2023  3:51 PM CDT -----  Contact: pt  Type:  Needs Medical Advice    Who Called: Pt wife  Would the patient rather a call back or a response via [facility]? call  Best Call Back Number: [phone]  Additional Information: States that they need a callback as soon as possible. States that they need to speak to someone about the pts rx for BD ULTRA-FINE SHORT PEN NEEDLE 31 gauge x 5/16" Ndle. States that pt is completely out and need a new refill. Also states that the pt need them for tonight. Please advise thank you        "

## 2023-10-11 NOTE — PROGRESS NOTES
"This dictation has been generated using Modal Fluency Dictation some phonetic errors may occur. Please contact author for clarification if needed.     Problem List Items Addressed This Visit       Type 2 diabetes mellitus with hyperglycemia, with long-term current use of insulin - Primary    Overview     Diabetes controlled, hyperglycemia at evening.  Should case of a hypoglycemia in the morning.         Relevant Orders    Comprehensive Metabolic Panel    Hemoglobin A1C    Lipid Panel    Urinalysis    Ambulatory referral/consult to Diabetes Education    HTN (hypertension)       Orders Placed This Encounter    Comprehensive Metabolic Panel    Hemoglobin A1C    Lipid Panel    Urinalysis    Ambulatory referral/consult to Diabetes Education    BD ULTRA-FINE SHORT PEN NEEDLE 31 gauge x 5/16" Ndle    cetirizine (ZYRTEC) 10 MG tablet     Diabetes recently uncontrolled.  Patient has moved back in with his daughter and she states about her diet and med compliance.  We discussed diet meds med refill.  Recently resumed meds.  Labs noted. A1c is not controlled.  Stop metformin due to GI upset.  We will monitor and see if his GI symptoms improve.  May consider use of metformin in the future.  Hypertension stable and controlled continue current therapy   Allergies add Zyrtec   Needs to contact the eye doctor regarding eye drop refill.    Follow up in about 3 months (around 1/11/2024).    ________________________________________________________________  ________________________________________________________________      Chief Complaint   Patient presents with    Follow-up     History of present illness  This 83 y.o. presents today for diabetes follow-up.  He is here with his daughter today.  He would moved back with his girlfriend.  She had noticed his diet was not what it should be and that he was not taking his medication.  He is moved back home with her.  Patient notes that he has been taking 30 units of Levemir when his " blood sugar is elevated.  Notes that the metformin hurts his stomach.  LOV May 2023. Missed 3 month follow up family ill. Pt with complaint of follow up labs. A1c is 9+. Discussed with pt and son-in-law.   LOV Penile yeast resolved. DM due for update of labs. Pt notes fatigue, anhedonia, and ED s/s. He ask about meds for energy.   LOV penile yeast infection.  Onset of symptoms in the last week.  Notes some draining in itching.  He is not sexually active as erectile dysfunction.  He is diabetic and out of Levemir.  There seems to be some confusion around his metformin dosing.  He has not been taking Flomax.  No other complaints.      Past Medical History:   Diagnosis Date    Diabetes mellitus     Hypertension     TIA (transient ischemic attack)     Urinary retention        Past Surgical History:   Procedure Laterality Date    CATARACT EXTRACTION Right     GLAUCOMA SURGERY Right        Family History   Family history unknown: Yes       Social History     Socioeconomic History    Marital status:    Tobacco Use    Smoking status: Never    Smokeless tobacco: Never   Substance and Sexual Activity    Alcohol use: Not Currently    Sexual activity: Not Currently       Current Outpatient Medications   Medication Sig Dispense Refill    amLODIPine (NORVASC) 10 MG tablet Take 10 mg by mouth.      atorvastatin (LIPITOR) 80 MG tablet Take 1 tablet (80 mg total) by mouth once daily. 90 tablet 1    brimonidine 0.2% (ALPHAGAN) 0.2 % Drop Place 1 drop into both eyes every 12 (twelve) hours. 30 mL 6    clopidogreL (PLAVIX) 75 mg tablet TAKE 1 TABLET(75 MG) BY MOUTH EVERY DAY 90 tablet 1    dorzolamide-timolol 2-0.5% (COSOPT) 22.3-6.8 mg/mL ophthalmic solution Place 1 drop into both eyes every 12 (twelve) hours. 30 mL 6    gabapentin (NEURONTIN) 300 MG capsule Take 300 mg by mouth every evening.      insulin detemir U-100, Levemir, (LEVEMIR FLEXPEN) 100 unit/mL (3 mL) InPn pen Inject 15 Units into the skin every evening. 15 mL 6  "   NOVOLOG FLEXPEN U-100 INSULIN 100 unit/mL (3 mL) InPn pen SMARTSI Unit(s) SUB-Q 3 Times Daily 15 mL 11    tamsulosin (FLOMAX) 0.4 mg Cap Take 1 capsule (0.4 mg total) by mouth every evening. 90 capsule 1    travoprost (TRAVATAN Z) 0.004 % ophthalmic solution Place 1 drop into both eyes every evening. 7.5 mL 6    TRUE METRIX GLUCOSE TEST STRIP Strp 3 (three) times daily. Use to test blood glucose 100 each 11    TRUEPLUS LANCETS 33 gauge Misc       aspirin (ECOTRIN) 81 MG EC tablet Take 81 mg by mouth once daily.      BD ULTRA-FINE SHORT PEN NEEDLE 31 gauge x 5/16" Ndle Insulin injection 4 times a day 120 each 11    cetirizine (ZYRTEC) 10 MG tablet Take 1 tablet (10 mg total) by mouth once daily. 30 tablet 0     No current facility-administered medications for this visit.       Review of patient's allergies indicates:  No Known Allergies    Physical examination  Vitals Reviewed\  Vitals:    10/11/23 0914   BP: 138/64   Pulse: 90     Body mass index is 31.49 kg/m². .     Weight: 88.5 kg (195 lb 1.7 oz)    Gen. Well-dressed well-nourished   Skin warm dry and intact.  No rashes noted.  Neuro. Awake alert oriented x4.  Normal judgment and cognition noted.  Extremities no clubbing cyanosis or edema noted.       Call or return to clinic prn if these symptoms worsen or fail to improve as anticipated.          "

## 2023-12-07 ENCOUNTER — OFFICE VISIT (OUTPATIENT)
Dept: OPHTHALMOLOGY | Facility: CLINIC | Age: 83
End: 2023-12-07
Payer: MEDICARE

## 2023-12-07 DIAGNOSIS — H40.1122 PRIMARY OPEN-ANGLE GLAUCOMA, LEFT EYE, MODERATE STAGE: ICD-10-CM

## 2023-12-07 DIAGNOSIS — H40.1113 PRIMARY OPEN-ANGLE GLAUCOMA, RIGHT EYE, SEVERE STAGE: Primary | ICD-10-CM

## 2023-12-07 DIAGNOSIS — H25.812 COMBINED FORM OF AGE-RELATED CATARACT, LEFT EYE: ICD-10-CM

## 2023-12-07 PROCEDURE — 1159F PR MEDICATION LIST DOCUMENTED IN MEDICAL RECORD: ICD-10-PCS | Mod: CPTII,S$GLB,, | Performed by: OPHTHALMOLOGY

## 2023-12-07 PROCEDURE — 99213 PR OFFICE/OUTPT VISIT, EST, LEVL III, 20-29 MIN: ICD-10-PCS | Mod: S$GLB,,, | Performed by: OPHTHALMOLOGY

## 2023-12-07 PROCEDURE — 1126F PR PAIN SEVERITY QUANTIFIED, NO PAIN PRESENT: ICD-10-PCS | Mod: CPTII,S$GLB,, | Performed by: OPHTHALMOLOGY

## 2023-12-07 PROCEDURE — 99999 PR PBB SHADOW E&M-EST. PATIENT-LVL III: CPT | Mod: PBBFAC,,, | Performed by: OPHTHALMOLOGY

## 2023-12-07 PROCEDURE — 99213 OFFICE O/P EST LOW 20 MIN: CPT | Mod: S$GLB,,, | Performed by: OPHTHALMOLOGY

## 2023-12-07 PROCEDURE — 1160F PR REVIEW ALL MEDS BY PRESCRIBER/CLIN PHARMACIST DOCUMENTED: ICD-10-PCS | Mod: CPTII,S$GLB,, | Performed by: OPHTHALMOLOGY

## 2023-12-07 PROCEDURE — 3288F PR FALLS RISK ASSESSMENT DOCUMENTED: ICD-10-PCS | Mod: CPTII,S$GLB,, | Performed by: OPHTHALMOLOGY

## 2023-12-07 PROCEDURE — 1101F PT FALLS ASSESS-DOCD LE1/YR: CPT | Mod: CPTII,S$GLB,, | Performed by: OPHTHALMOLOGY

## 2023-12-07 PROCEDURE — 1126F AMNT PAIN NOTED NONE PRSNT: CPT | Mod: CPTII,S$GLB,, | Performed by: OPHTHALMOLOGY

## 2023-12-07 PROCEDURE — 99999 PR PBB SHADOW E&M-EST. PATIENT-LVL III: ICD-10-PCS | Mod: PBBFAC,,, | Performed by: OPHTHALMOLOGY

## 2023-12-07 PROCEDURE — 3288F FALL RISK ASSESSMENT DOCD: CPT | Mod: CPTII,S$GLB,, | Performed by: OPHTHALMOLOGY

## 2023-12-07 PROCEDURE — 1160F RVW MEDS BY RX/DR IN RCRD: CPT | Mod: CPTII,S$GLB,, | Performed by: OPHTHALMOLOGY

## 2023-12-07 PROCEDURE — 1101F PR PT FALLS ASSESS DOC 0-1 FALLS W/OUT INJ PAST YR: ICD-10-PCS | Mod: CPTII,S$GLB,, | Performed by: OPHTHALMOLOGY

## 2023-12-07 PROCEDURE — 1159F MED LIST DOCD IN RCRD: CPT | Mod: CPTII,S$GLB,, | Performed by: OPHTHALMOLOGY

## 2023-12-07 RX ORDER — TRAVOPROST OPHTHALMIC SOLUTION 0.04 MG/ML
1 SOLUTION OPHTHALMIC NIGHTLY
Qty: 7.5 ML | Refills: 6 | Status: SHIPPED | OUTPATIENT
Start: 2023-12-07

## 2023-12-07 RX ORDER — BRIMONIDINE TARTRATE 2 MG/ML
1 SOLUTION/ DROPS OPHTHALMIC EVERY 12 HOURS
Qty: 30 ML | Refills: 6 | Status: SHIPPED | OUTPATIENT
Start: 2023-12-07

## 2023-12-07 RX ORDER — DORZOLAMIDE HYDROCHLORIDE AND TIMOLOL MALEATE 20; 5 MG/ML; MG/ML
1 SOLUTION/ DROPS OPHTHALMIC EVERY 12 HOURS
Qty: 30 ML | Refills: 6 | Status: SHIPPED | OUTPATIENT
Start: 2023-12-07

## 2023-12-07 NOTE — PROGRESS NOTES
HPI     Follow-up     Additional comments: 4 month IOP check. Denies eye pain today. Using   Brimonidine & Cosopt OU TID & Travatan OU BID states he did not use drops   today at all yet.           Last edited by Shy Mathis on 12/7/2023  1:50 PM.            Assessment /Plan     For exam results, see Encounter Report.    Primary open-angle glaucoma, right eye, severe stage  -     dorzolamide-timolol 2-0.5% (COSOPT) 22.3-6.8 mg/mL ophthalmic solution; Place 1 drop into both eyes every 12 (twelve) hours.  Dispense: 30 mL; Refill: 6  -     travoprost (TRAVATAN Z) 0.004 % ophthalmic solution; Place 1 drop into both eyes every evening.  Dispense: 7.5 mL; Refill: 6  -     brimonidine 0.2% (ALPHAGAN) 0.2 % Drop; Place 1 drop into both eyes every 12 (twelve) hours.  Dispense: 30 mL; Refill: 6    Primary open-angle glaucoma, left eye, moderate stage  -     dorzolamide-timolol 2-0.5% (COSOPT) 22.3-6.8 mg/mL ophthalmic solution; Place 1 drop into both eyes every 12 (twelve) hours.  Dispense: 30 mL; Refill: 6  -     travoprost (TRAVATAN Z) 0.004 % ophthalmic solution; Place 1 drop into both eyes every evening.  Dispense: 7.5 mL; Refill: 6  -     brimonidine 0.2% (ALPHAGAN) 0.2 % Drop; Place 1 drop into both eyes every 12 (twelve) hours.  Dispense: 30 mL; Refill: 6    Combined form of age-related cataract, left eye      1. Primary open-angle glaucoma, right eye, severe stage  2. Primary open-angle glaucoma, left eye, moderate stage  +famhx (mother)  Thin pachy    HM vision OD, s/p tube shunt  OCT NFL damaged OD>OS  Moderate HVF damage OS  Tmax unknown    IOP higher than previous, would prefer low teens OS  Pt did not take drops this AM nor last night  Compliance stressed    Brim bid OU  Dorz/viv bid OU  Konrad qhs OU    F/u 4-5 months, HVF, IOP    3. Combined form of age-related cataract, left eye  Moderate, pt not ready for sx  Consider phaco/migs in the future

## 2024-01-08 ENCOUNTER — OFFICE VISIT (OUTPATIENT)
Dept: UROLOGY | Facility: CLINIC | Age: 84
End: 2024-01-08
Payer: MEDICARE

## 2024-01-08 DIAGNOSIS — R39.14 BENIGN PROSTATIC HYPERPLASIA WITH INCOMPLETE BLADDER EMPTYING: ICD-10-CM

## 2024-01-08 DIAGNOSIS — N40.1 BENIGN PROSTATIC HYPERPLASIA WITH INCOMPLETE BLADDER EMPTYING: ICD-10-CM

## 2024-01-08 DIAGNOSIS — N40.0 BENIGN PROSTATIC HYPERPLASIA, UNSPECIFIED WHETHER LOWER URINARY TRACT SYMPTOMS PRESENT: Primary | ICD-10-CM

## 2024-01-08 LAB — POC RESIDUAL URINE VOLUME: 0 ML (ref 0–100)

## 2024-01-08 PROCEDURE — 99999 PR PBB SHADOW E&M-EST. PATIENT-LVL III: CPT | Mod: PBBFAC,,, | Performed by: NURSE PRACTITIONER

## 2024-01-08 PROCEDURE — 1159F MED LIST DOCD IN RCRD: CPT | Mod: CPTII,S$GLB,, | Performed by: NURSE PRACTITIONER

## 2024-01-08 PROCEDURE — 51798 US URINE CAPACITY MEASURE: CPT | Mod: S$GLB,,, | Performed by: NURSE PRACTITIONER

## 2024-01-08 PROCEDURE — 99213 OFFICE O/P EST LOW 20 MIN: CPT | Mod: S$GLB,,, | Performed by: NURSE PRACTITIONER

## 2024-01-08 PROCEDURE — 1160F RVW MEDS BY RX/DR IN RCRD: CPT | Mod: CPTII,S$GLB,, | Performed by: NURSE PRACTITIONER

## 2024-01-08 PROCEDURE — 3072F LOW RISK FOR RETINOPATHY: CPT | Mod: CPTII,S$GLB,, | Performed by: NURSE PRACTITIONER

## 2024-01-08 RX ORDER — TAMSULOSIN HYDROCHLORIDE 0.4 MG/1
0.4 CAPSULE ORAL NIGHTLY
Qty: 90 CAPSULE | Refills: 3 | Status: SHIPPED | OUTPATIENT
Start: 2024-01-08 | End: 2025-01-08

## 2024-01-08 NOTE — PROGRESS NOTES
"[facility] Note  Staff: KIRSTEN Silver    PCP: CORINNE Still  Urologist:  NELIDA Evans    Chief Complaint: F/UP-BPH with LUTS    Subjective:        HPI: Evelio Pandya is a 83 y.o. male presents today in office for recheck of his previous LUTS at this time.    Plan of Care by MD after last OV (10/23):  "I think that is overactive bladder was likely related to his acute stroke that he had.  It is since resolved I think his symptoms resolved on their own.  Rectal exam today reveals a small prostate.  He does state that he has some urinary hesitancy so he can restart his Flomax but just take 1 a night.  If he finds that it helps with the hesitancy then he can continue it but if he does not feel like it is helping with hesitancy he can stay off of it.     Also having some urgency which is the urge to urinate and feeling like he is not going to have enough time.  However no frequency.  First should stop drinking so much caffeine and would also consider an overactive bladder medication if he stops taking the caffeine was still having urgency.  Otherwise I would avoid especially since he does not even like taking medications and they have side effects.  Also stressed the importance of taking his diabetes and blood pressure medications   Dc finasteride - doesn't need. Used to shrink prostate.      Fu in 3 months with NP aua ssx, pvr, sx check-- see if urgency worsening. If he needs oab med would do myrbetriq but watch pressure."       HX:  Initial consult by Nathan in clinic on 10/5/23 for oab:  He is here with his daughter [place].  Was seen by us/Urology nurse practitioner in September after he had a TIA which resulted in temporary loss of vision.  He was 1st diagnosed with urinary retention in the ER.  However unclear how much actually drained.  Then he saw us in clinic on 09/26/2022.  His Archer had already been removed that day before because he had some blood in his Archer.  When " he came to the clinic on 09/26/2022 his PVR was 229 Na was urinating frequently.  He was started on Flomax.  He returned the following day on 09/27/2022 and his Flomax has been increased in his PVR Was down to 13.  Still urinating frequently.    He returned again on November 7th and is PVR was 101.  Taking Flomax and finasteride was discussed.  Today he states that he is urinating every 1-3 hours.  Denies any significant frequency and says it is actually improved since a few months ago.  However does have urgency but without urge incontinence.  Wakes up about 3 times a night.  Denies any slow stream but having some hesistancy off Flomax 0.8 (which he ran out of 2 weeks ago).    Does drink 2-3 caffeinated drinks a day.  Denies any constipation.  Last A1c in July was normal 6.4.  No new diabetes med.  Daughter does not think he is taking his diabetes medications like.  Daughter wanted to bring him for fu  Ua: 250 gluc     Urine history: family history of kidney, bladder or prostate cancer:No, personal or family history of kidney stones: No,tobacco use: Yes - , anticoagulation: No  10/5/23            250 gluc/30 prot     PSA History: no fam hx of prostate cancer  10/5/23            Joseph: 20g     OV 01/8/24:  UA performed-pt was unable to urinate  PVR by bladder scan today is 0 mL  Pt denies gross hematuria or dysuria at this time.  Pt currently takes Flomax 0.4 mg daily with no problems.  No longer taking Finasteride per MD orders last ov.  Pt remains with same LUTS as last ov regarding urinary frequency and urgency, more prominent at night time.  Nocturia is 3-4x nightly.    REVIEW OF SYSTEMS:  A comprehensive 10 system review was performed and is negative except as noted above in HPI    PMHx:  Past Medical History:   Diagnosis Date    Diabetes mellitus     Hypertension     TIA (transient ischemic attack)     Urinary retention      PSHx:  Past Surgical History:   Procedure Laterality Date    CATARACT EXTRACTION Right      GLAUCOMA SURGERY Right      Allergies:  Patient has no known allergies.    Medications: reviewed   Objective:   There were no vitals filed for this visit.    General:WDWN in NAD  Eyes: PERRLA, normal conjunctiva  Respiratory: no increased work on breathing, clear to auscultation  Cardiovascular: regular rate and rhythm. No obvious extremity edema.  GI: palpation of masses. No tenderness. No hepatosplenomegaly to palpation.  Musculoskeletal: normal range of motion of bilateral upper extremities. Normal muscle strength and tone.  Skin: no obvious rashes or lesions. No tightening of skin noted.  Neurologic: CN grossly normal. Normal sensation.   Psychiatric: awake, alert and oriented x 3. Mood and affect normal. Cooperative.        Assessment:       1. Benign prostatic hyperplasia, unspecified whether lower urinary tract symptoms present    2. Benign prostatic hyperplasia with incomplete bladder emptying          Plan:     Pt will attempt to move up time of Flomax dosing to at least 3 hrs prior to bedtime to see if this helps with the nocturia.  He would like to try this first before starting an OAB medication.    Discussed conservative measures to control urgency and frequency including avoiding bladder irritants, timed voiding, not postponing voiding, and bowel regimen (as distended bowel has extrinsic compressive effect on bladder. Discussed bladder irritants include coffe (even decaf), tea, alcohol, soda, spicy foods, acidic juices (orange, tomato), vinegar, and artificial sweeteners.     F/u with me in 4-6 weeks to recheck his LUTS.  Pt verbalized understanding at this time.    [facility]: Active    Natali East, BERTIN-C

## 2024-02-08 ENCOUNTER — OFFICE VISIT (OUTPATIENT)
Dept: FAMILY MEDICINE | Facility: CLINIC | Age: 84
End: 2024-02-08
Payer: MEDICARE

## 2024-02-08 VITALS
OXYGEN SATURATION: 98 % | DIASTOLIC BLOOD PRESSURE: 66 MMHG | HEIGHT: 66 IN | HEART RATE: 73 BPM | SYSTOLIC BLOOD PRESSURE: 120 MMHG | BODY MASS INDEX: 32.92 KG/M2 | WEIGHT: 204.81 LBS

## 2024-02-08 DIAGNOSIS — Z79.4 TYPE 2 DIABETES MELLITUS WITH HYPERGLYCEMIA, WITH LONG-TERM CURRENT USE OF INSULIN: Primary | ICD-10-CM

## 2024-02-08 DIAGNOSIS — E11.65 TYPE 2 DIABETES MELLITUS WITH HYPERGLYCEMIA, WITH LONG-TERM CURRENT USE OF INSULIN: Primary | ICD-10-CM

## 2024-02-08 DIAGNOSIS — I10 HYPERTENSION, UNSPECIFIED TYPE: ICD-10-CM

## 2024-02-08 PROCEDURE — 99214 OFFICE O/P EST MOD 30 MIN: CPT | Mod: S$GLB,,, | Performed by: NURSE PRACTITIONER

## 2024-02-08 PROCEDURE — 1101F PT FALLS ASSESS-DOCD LE1/YR: CPT | Mod: CPTII,S$GLB,, | Performed by: NURSE PRACTITIONER

## 2024-02-08 PROCEDURE — 3072F LOW RISK FOR RETINOPATHY: CPT | Mod: CPTII,S$GLB,, | Performed by: NURSE PRACTITIONER

## 2024-02-08 PROCEDURE — 1159F MED LIST DOCD IN RCRD: CPT | Mod: CPTII,S$GLB,, | Performed by: NURSE PRACTITIONER

## 2024-02-08 PROCEDURE — 1126F AMNT PAIN NOTED NONE PRSNT: CPT | Mod: CPTII,S$GLB,, | Performed by: NURSE PRACTITIONER

## 2024-02-08 PROCEDURE — 3074F SYST BP LT 130 MM HG: CPT | Mod: CPTII,S$GLB,, | Performed by: NURSE PRACTITIONER

## 2024-02-08 PROCEDURE — 3288F FALL RISK ASSESSMENT DOCD: CPT | Mod: CPTII,S$GLB,, | Performed by: NURSE PRACTITIONER

## 2024-02-08 PROCEDURE — 99999 PR PBB SHADOW E&M-EST. PATIENT-LVL III: CPT | Mod: PBBFAC,,, | Performed by: NURSE PRACTITIONER

## 2024-02-08 PROCEDURE — 3078F DIAST BP <80 MM HG: CPT | Mod: CPTII,S$GLB,, | Performed by: NURSE PRACTITIONER

## 2024-02-08 RX ORDER — INSULIN ASPART 100 [IU]/ML
INJECTION, SOLUTION INTRAVENOUS; SUBCUTANEOUS
Qty: 15 ML | Refills: 11 | Status: SHIPPED | OUTPATIENT
Start: 2024-02-08

## 2024-02-08 RX ORDER — INSULIN DETEMIR 100 [IU]/ML
15 INJECTION, SOLUTION SUBCUTANEOUS NIGHTLY
Qty: 15 ML | Refills: 2 | Status: SHIPPED | OUTPATIENT
Start: 2024-02-08 | End: 2024-06-10

## 2024-02-09 ENCOUNTER — LAB VISIT (OUTPATIENT)
Dept: LAB | Facility: HOSPITAL | Age: 84
End: 2024-02-09
Attending: NURSE PRACTITIONER
Payer: MEDICARE

## 2024-02-09 ENCOUNTER — TELEPHONE (OUTPATIENT)
Dept: FAMILY MEDICINE | Facility: CLINIC | Age: 84
End: 2024-02-09
Payer: MEDICARE

## 2024-02-09 DIAGNOSIS — E11.65 TYPE 2 DIABETES MELLITUS WITH HYPERGLYCEMIA, WITH LONG-TERM CURRENT USE OF INSULIN: ICD-10-CM

## 2024-02-09 DIAGNOSIS — Z79.4 TYPE 2 DIABETES MELLITUS WITH HYPERGLYCEMIA, WITH LONG-TERM CURRENT USE OF INSULIN: ICD-10-CM

## 2024-02-09 LAB
ALBUMIN SERPL BCP-MCNC: 3.6 G/DL (ref 3.5–5.2)
ALP SERPL-CCNC: 65 U/L (ref 55–135)
ALT SERPL W/O P-5'-P-CCNC: 10 U/L (ref 10–44)
ANION GAP SERPL CALC-SCNC: 6 MMOL/L (ref 8–16)
AST SERPL-CCNC: 13 U/L (ref 10–40)
BILIRUB SERPL-MCNC: 1.1 MG/DL (ref 0.1–1)
BUN SERPL-MCNC: 14 MG/DL (ref 8–23)
CALCIUM SERPL-MCNC: 9.1 MG/DL (ref 8.7–10.5)
CHLORIDE SERPL-SCNC: 109 MMOL/L (ref 95–110)
CHOLEST SERPL-MCNC: 110 MG/DL (ref 120–199)
CHOLEST/HDLC SERPL: 3.2 {RATIO} (ref 2–5)
CO2 SERPL-SCNC: 26 MMOL/L (ref 23–29)
CREAT SERPL-MCNC: 1.2 MG/DL (ref 0.5–1.4)
EST. GFR  (NO RACE VARIABLE): >60 ML/MIN/1.73 M^2
ESTIMATED AVG GLUCOSE: 131 MG/DL (ref 68–131)
GLUCOSE SERPL-MCNC: 127 MG/DL (ref 70–110)
HBA1C MFR BLD: 6.2 % (ref 4–5.6)
HDLC SERPL-MCNC: 34 MG/DL (ref 40–75)
HDLC SERPL: 30.9 % (ref 20–50)
LDLC SERPL CALC-MCNC: 62.6 MG/DL (ref 63–159)
NONHDLC SERPL-MCNC: 76 MG/DL
POTASSIUM SERPL-SCNC: 4 MMOL/L (ref 3.5–5.1)
PROT SERPL-MCNC: 6.9 G/DL (ref 6–8.4)
SODIUM SERPL-SCNC: 141 MMOL/L (ref 136–145)
TRIGL SERPL-MCNC: 67 MG/DL (ref 30–150)

## 2024-02-09 PROCEDURE — 80061 LIPID PANEL: CPT | Performed by: NURSE PRACTITIONER

## 2024-02-09 PROCEDURE — 83036 HEMOGLOBIN GLYCOSYLATED A1C: CPT | Performed by: NURSE PRACTITIONER

## 2024-02-09 PROCEDURE — 36415 COLL VENOUS BLD VENIPUNCTURE: CPT | Mod: PO | Performed by: NURSE PRACTITIONER

## 2024-02-09 PROCEDURE — 80053 COMPREHEN METABOLIC PANEL: CPT | Performed by: NURSE PRACTITIONER

## 2024-02-09 NOTE — TELEPHONE ENCOUNTER
----- Message from Jossue Rosas sent at 2/9/2024  3:19 PM CST -----  Type: Needs Medical Advice  Who Called:  Danielle/ Barrett     [facility] Call Back Number: [phone]  Additional Information: Caller states that she would like a callback regarding  clinical questions about the patient's medications    Ref# [id number]

## 2024-02-09 NOTE — PROGRESS NOTES
This dictation has been generated using Modal Fluency Dictation some phonetic errors may occur. Please contact author for clarification if needed.     Problem List Items Addressed This Visit       Type 2 diabetes mellitus with hyperglycemia, with long-term current use of insulin - Primary    Overview     Diabetes controlled, hyperglycemia at evening.  Should case of a hypoglycemia in the morning.         Relevant Medications    insulin detemir U-100, Levemir, (LEVEMIR FLEXPEN) 100 unit/mL (3 mL) InPn pen    NOVOLOG FLEXPEN U-100 INSULIN 100 unit/mL (3 mL) InPn pen    Other Relevant Orders    Comprehensive Metabolic Panel    Hemoglobin A1C    Lipid Panel    HTN (hypertension)       Orders Placed This Encounter    Comprehensive Metabolic Panel    Hemoglobin A1C    Lipid Panel    insulin detemir U-100, Levemir, (LEVEMIR FLEXPEN) 100 unit/mL (3 mL) InPn pen    NOVOLOG FLEXPEN U-100 INSULIN 100 unit/mL (3 mL) InPn pen     Diabetes due for reassessment  Patient has moved back in with his daughter and she states about her diet and med compliance.  We discussed diet meds med refill.  Recently resumed meds.     Previously stopped metformin due to GI upset.  We will monitor and see if his GI symptoms improve.  May consider use of metformin in the future.  Hypertension stable and controlled continue current therapy   Allergies add Zyrtec   Needs to contact the eye doctor regarding eye drop refill.    Follow up in about 4 months (around 6/8/2024).  In excessive of 30 minutes total time spent for evaluation and management services. Time included elements of the following: time spent preparing to see patient, obtaining and reviewing separately obtained history, exam, evaluation, counseling and education of patient/family member or care giver, documenting in the HMR, independently interpreting results and communication of results, coordination of care ordering medications, tests, or procedures, referral and communication to other  health care professionals.    ________________________________________________________________  ________________________________________________________________      No chief complaint on file.    History of present illness  This 83 y.o. presents today for diabetes follow-up.  Reports better glucose control at home due for update of A1c.  Some hyper and hypoglycemic episodes noted nothing emergent.  Hypertension well controlled.  LOV 10/2023 diabetes follow-up.  He is here with his daughter today.  He would moved back with his girlfriend.  She had noticed his diet was not what it should be and that he was not taking his medication.  He is moved back home with her.  Patient notes that he has been taking 30 units of Levemir when his blood sugar is elevated.  Notes that the metformin hurts his stomach.  LOV May 2023. Missed 3 month follow up family ill. Pt with complaint of follow up labs. A1c is 9+. Discussed with pt and son-in-law.   LOV Penile yeast resolved. DM due for update of labs. Pt notes fatigue, anhedonia, and ED s/s. He ask about meds for energy.   LOV penile yeast infection.  Onset of symptoms in the last week.  Notes some draining in itching.  He is not sexually active as erectile dysfunction.  He is diabetic and out of Levemir.  There seems to be some confusion around his metformin dosing.  He has not been taking Flomax.  No other complaints.      Past Medical History:   Diagnosis Date    Diabetes mellitus     Hypertension     TIA (transient ischemic attack)     Urinary retention        Past Surgical History:   Procedure Laterality Date    CATARACT EXTRACTION Right     GLAUCOMA SURGERY Right        Family History   Family history unknown: Yes       Social History     Socioeconomic History    Marital status:    Tobacco Use    Smoking status: Never    Smokeless tobacco: Never   Substance and Sexual Activity    Alcohol use: Not Currently    Sexual activity: Not Currently       Current Outpatient  "Medications   Medication Sig Dispense Refill    amLODIPine (NORVASC) 10 MG tablet Take 10 mg by mouth.      aspirin (ECOTRIN) 81 MG EC tablet Take 81 mg by mouth once daily.      atorvastatin (LIPITOR) 80 MG tablet Take 1 tablet (80 mg total) by mouth once daily. 90 tablet 1    BD ULTRA-FINE SHORT PEN NEEDLE 31 gauge x 5/16" Ndle Insulin injection 4 times a day 120 each 11    brimonidine 0.2% (ALPHAGAN) 0.2 % Drop Place 1 drop into both eyes every 12 (twelve) hours. 30 mL 6    clopidogreL (PLAVIX) 75 mg tablet TAKE 1 TABLET(75 MG) BY MOUTH EVERY DAY 90 tablet 1    dorzolamide-timolol 2-0.5% (COSOPT) 22.3-6.8 mg/mL ophthalmic solution Place 1 drop into both eyes every 12 (twelve) hours. 30 mL 6    gabapentin (NEURONTIN) 300 MG capsule Take 300 mg by mouth every evening.      tamsulosin (FLOMAX) 0.4 mg Cap Take 1 capsule (0.4 mg total) by mouth every evening. 90 capsule 3    travoprost (TRAVATAN Z) 0.004 % ophthalmic solution Place 1 drop into both eyes every evening. 7.5 mL 6    TRUE METRIX GLUCOSE TEST STRIP Strp 3 (three) times daily. Use to test blood glucose 100 each 11    TRUEPLUS LANCETS 33 gauge Misc       insulin detemir U-100, Levemir, (LEVEMIR FLEXPEN) 100 unit/mL (3 mL) InPn pen Inject 15 Units into the skin every evening. 15 mL 2    NOVOLOG FLEXPEN U-100 INSULIN 100 unit/mL (3 mL) InPn pen SMARTSI Unit(s) SUB-Q 3 Times Daily 15 mL 11     No current facility-administered medications for this visit.       Review of patient's allergies indicates:  No Known Allergies    Physical examination  Vitals Reviewed\  Vitals:    24 1608   BP: 120/66   Pulse: 73     Body mass index is 33.06 kg/m². .     Weight: 92.9 kg (204 lb 12.9 oz)    Gen. Well-dressed well-nourished   Skin warm dry and intact.  No rashes noted.  Neuro. Awake alert oriented x4.  Normal judgment and cognition noted.  Extremities no clubbing cyanosis or edema noted.       Call or return to clinic prn if these symptoms worsen or fail to " improve as anticipated.

## 2024-02-12 NOTE — TELEPHONE ENCOUNTER
Returned call to [facility] and fax number given in regards to questions about clinicals. Answered all questions and concerns.

## 2024-02-23 ENCOUNTER — OFFICE VISIT (OUTPATIENT)
Dept: UROLOGY | Facility: CLINIC | Age: 84
End: 2024-02-23
Payer: MEDICARE

## 2024-02-23 DIAGNOSIS — R35.1 NOCTURIA: ICD-10-CM

## 2024-02-23 DIAGNOSIS — N40.0 BENIGN PROSTATIC HYPERPLASIA, UNSPECIFIED WHETHER LOWER URINARY TRACT SYMPTOMS PRESENT: Primary | ICD-10-CM

## 2024-02-23 DIAGNOSIS — R39.15 URINARY URGENCY: ICD-10-CM

## 2024-02-23 PROCEDURE — 99213 OFFICE O/P EST LOW 20 MIN: CPT | Mod: S$GLB,,, | Performed by: NURSE PRACTITIONER

## 2024-02-23 PROCEDURE — 3072F LOW RISK FOR RETINOPATHY: CPT | Mod: CPTII,S$GLB,, | Performed by: NURSE PRACTITIONER

## 2024-02-23 PROCEDURE — 1159F MED LIST DOCD IN RCRD: CPT | Mod: CPTII,S$GLB,, | Performed by: NURSE PRACTITIONER

## 2024-02-23 PROCEDURE — 1160F RVW MEDS BY RX/DR IN RCRD: CPT | Mod: CPTII,S$GLB,, | Performed by: NURSE PRACTITIONER

## 2024-02-23 PROCEDURE — 99999 PR PBB SHADOW E&M-EST. PATIENT-LVL III: CPT | Mod: PBBFAC,,, | Performed by: NURSE PRACTITIONER

## 2024-02-27 DIAGNOSIS — Z00.00 ENCOUNTER FOR MEDICARE ANNUAL WELLNESS EXAM: ICD-10-CM

## 2024-03-06 ENCOUNTER — HOSPITAL ENCOUNTER (INPATIENT)
Facility: HOSPITAL | Age: 84
LOS: 1 days | Discharge: HOME | DRG: 078 | End: 2024-03-08
Attending: STUDENT IN AN ORGANIZED HEALTH CARE EDUCATION/TRAINING PROGRAM | Admitting: HOSPITALIST
Payer: MEDICARE

## 2024-03-06 DIAGNOSIS — R47.01 EXPRESSIVE APHASIA: ICD-10-CM

## 2024-03-06 DIAGNOSIS — I65.22 STENOSIS OF LEFT CAROTID ARTERY: Primary | ICD-10-CM

## 2024-03-06 DIAGNOSIS — G45.9 TIA (TRANSIENT ISCHEMIC ATTACK): ICD-10-CM

## 2024-03-06 PROBLEM — N18.30 CHRONIC KIDNEY DISEASE, STAGE 3 UNSPECIFIED: Status: ACTIVE | Noted: 2024-03-06

## 2024-03-06 PROBLEM — E66.2 MORBID (SEVERE) OBESITY WITH ALVEOLAR HYPOVENTILATION: Status: ACTIVE | Noted: 2024-03-06

## 2024-03-06 PROBLEM — I65.29 CAROTID STENOSIS: Status: ACTIVE | Noted: 2024-03-06

## 2024-03-06 LAB
ALBUMIN SERPL BCP-MCNC: 3.6 G/DL (ref 3.5–5.2)
ALP SERPL-CCNC: 56 U/L (ref 55–135)
ALT SERPL W/O P-5'-P-CCNC: 14 U/L (ref 10–44)
ANION GAP SERPL CALC-SCNC: 7 MMOL/L (ref 8–16)
AST SERPL-CCNC: 13 U/L (ref 10–40)
BASOPHILS # BLD AUTO: 0.03 K/UL (ref 0–0.2)
BASOPHILS NFR BLD: 0.3 % (ref 0–1.9)
BILIRUB SERPL-MCNC: 0.9 MG/DL (ref 0.1–1)
BUN SERPL-MCNC: 18 MG/DL (ref 8–23)
CALCIUM SERPL-MCNC: 8.9 MG/DL (ref 8.7–10.5)
CHLORIDE SERPL-SCNC: 108 MMOL/L (ref 95–110)
CHOLEST SERPL-MCNC: 112 MG/DL (ref 120–199)
CHOLEST/HDLC SERPL: 2.9 {RATIO} (ref 2–5)
CO2 SERPL-SCNC: 23 MMOL/L (ref 23–29)
CREAT SERPL-MCNC: 1.4 MG/DL (ref 0.5–1.4)
DIFFERENTIAL METHOD BLD: ABNORMAL
EOSINOPHIL # BLD AUTO: 0.2 K/UL (ref 0–0.5)
EOSINOPHIL NFR BLD: 2.5 % (ref 0–8)
ERYTHROCYTE [DISTWIDTH] IN BLOOD BY AUTOMATED COUNT: 11.8 % (ref 11.5–14.5)
EST. GFR  (NO RACE VARIABLE): 50 ML/MIN/1.73 M^2
GLUCOSE SERPL-MCNC: 101 MG/DL (ref 70–110)
HCT VFR BLD AUTO: 35.9 % (ref 40–54)
HDLC SERPL-MCNC: 39 MG/DL (ref 40–75)
HDLC SERPL: 34.8 % (ref 20–50)
HGB BLD-MCNC: 12 G/DL (ref 14–18)
IMM GRANULOCYTES # BLD AUTO: 0.06 K/UL (ref 0–0.04)
IMM GRANULOCYTES NFR BLD AUTO: 0.7 % (ref 0–0.5)
INR PPP: 1.1 (ref 0.8–1.2)
LDLC SERPL CALC-MCNC: 55.4 MG/DL (ref 63–159)
LYMPHOCYTES # BLD AUTO: 2.1 K/UL (ref 1–4.8)
LYMPHOCYTES NFR BLD: 24.1 % (ref 18–48)
MCH RBC QN AUTO: 33.1 PG (ref 27–31)
MCHC RBC AUTO-ENTMCNC: 33.4 G/DL (ref 32–36)
MCV RBC AUTO: 99 FL (ref 82–98)
MONOCYTES # BLD AUTO: 0.6 K/UL (ref 0.3–1)
MONOCYTES NFR BLD: 6.7 % (ref 4–15)
NEUTROPHILS # BLD AUTO: 5.8 K/UL (ref 1.8–7.7)
NEUTROPHILS NFR BLD: 65.7 % (ref 38–73)
NONHDLC SERPL-MCNC: 73 MG/DL
NRBC BLD-RTO: 0 /100 WBC
PLATELET # BLD AUTO: 187 K/UL (ref 150–450)
PMV BLD AUTO: 8.8 FL (ref 9.2–12.9)
POC PTINR: 1.3 (ref 0.9–1.2)
POCT GLUCOSE: 99 MG/DL (ref 70–110)
POTASSIUM SERPL-SCNC: 4 MMOL/L (ref 3.5–5.1)
PROT SERPL-MCNC: 6.9 G/DL (ref 6–8.4)
PROTHROMBIN TIME: 11.8 SEC (ref 9–12.5)
RBC # BLD AUTO: 3.62 M/UL (ref 4.6–6.2)
SAMPLE: ABNORMAL
SODIUM SERPL-SCNC: 138 MMOL/L (ref 136–145)
TRIGL SERPL-MCNC: 88 MG/DL (ref 30–150)
TSH SERPL DL<=0.005 MIU/L-ACNC: 1.36 UIU/ML (ref 0.4–4)
WBC # BLD AUTO: 8.85 K/UL (ref 3.9–12.7)

## 2024-03-06 PROCEDURE — 99447 NTRPROF PH1/NTRNET/EHR 11-20: CPT | Mod: ,,, | Performed by: STUDENT IN AN ORGANIZED HEALTH CARE EDUCATION/TRAINING PROGRAM

## 2024-03-06 PROCEDURE — 36415 COLL VENOUS BLD VENIPUNCTURE: CPT | Performed by: STUDENT IN AN ORGANIZED HEALTH CARE EDUCATION/TRAINING PROGRAM

## 2024-03-06 PROCEDURE — 85025 COMPLETE CBC W/AUTO DIFF WBC: CPT | Performed by: STUDENT IN AN ORGANIZED HEALTH CARE EDUCATION/TRAINING PROGRAM

## 2024-03-06 PROCEDURE — 93010 ELECTROCARDIOGRAM REPORT: CPT | Mod: ,,, | Performed by: GENERAL PRACTICE

## 2024-03-06 PROCEDURE — 85610 PROTHROMBIN TIME: CPT

## 2024-03-06 PROCEDURE — 25000003 PHARM REV CODE 250: Performed by: STUDENT IN AN ORGANIZED HEALTH CARE EDUCATION/TRAINING PROGRAM

## 2024-03-06 PROCEDURE — 80061 LIPID PANEL: CPT | Performed by: STUDENT IN AN ORGANIZED HEALTH CARE EDUCATION/TRAINING PROGRAM

## 2024-03-06 PROCEDURE — 93005 ELECTROCARDIOGRAM TRACING: CPT

## 2024-03-06 PROCEDURE — 85610 PROTHROMBIN TIME: CPT | Performed by: STUDENT IN AN ORGANIZED HEALTH CARE EDUCATION/TRAINING PROGRAM

## 2024-03-06 PROCEDURE — 99285 EMERGENCY DEPT VISIT HI MDM: CPT | Mod: 25

## 2024-03-06 PROCEDURE — 82962 GLUCOSE BLOOD TEST: CPT

## 2024-03-06 PROCEDURE — 80053 COMPREHEN METABOLIC PANEL: CPT | Performed by: STUDENT IN AN ORGANIZED HEALTH CARE EDUCATION/TRAINING PROGRAM

## 2024-03-06 PROCEDURE — 84443 ASSAY THYROID STIM HORMONE: CPT | Performed by: STUDENT IN AN ORGANIZED HEALTH CARE EDUCATION/TRAINING PROGRAM

## 2024-03-06 PROCEDURE — 99900035 HC TECH TIME PER 15 MIN (STAT)

## 2024-03-06 RX ORDER — CLOPIDOGREL BISULFATE 75 MG/1
75 TABLET ORAL DAILY
Status: DISCONTINUED | OUTPATIENT
Start: 2024-03-07 | End: 2024-03-06

## 2024-03-06 RX ORDER — CLOPIDOGREL BISULFATE 75 MG/1
75 TABLET ORAL
Status: COMPLETED | OUTPATIENT
Start: 2024-03-06 | End: 2024-03-06

## 2024-03-06 RX ORDER — ASPIRIN 325 MG
325 TABLET ORAL
Status: COMPLETED | OUTPATIENT
Start: 2024-03-06 | End: 2024-03-06

## 2024-03-06 RX ADMIN — CLOPIDOGREL BISULFATE 75 MG: 75 TABLET, FILM COATED ORAL at 11:03

## 2024-03-06 RX ADMIN — ASPIRIN 325 MG: 325 TABLET ORAL at 06:03

## 2024-03-06 NOTE — CARE UPDATE
Latest Reference Range & Units 03/06/24 15:36   POC PTINR 0.9 - 1.2  1.3 (H)   (H): Data is abnormally high  Dr Meyer notified

## 2024-03-06 NOTE — TELEMEDICINE CONSULT
[facility]  Vascular Neurology  Comprehensive Stroke Center  TeleVascular Neurology Interprofessional Consult Note           Consult Information  Consult to Telemedicine - Acute Stroke  Consult performed by: Sofía Navarro MD  Consult ordered by: Bonnie Meyer Jr., DO          Consulting Provider: BONNIE MEYER JR.   Patient Location:  [facility] EMERGENCY DEPARTMENT     Summary of patient's symptoms:  84 y.o. male with a PMHx significant for DM, HTN presenting with slurred speech as well as word-finding difficulties at 1330, which have since resolved. Patient has had multiple similar episodes over the past month.     He is on aspirin and clopidogrel. No AC medications.     Images personally reviewed and interpreted:  Study: Head CT and CTA Head & Neck  Study Interpretation: No acute intracranial hemorrhage. No LVO. Calcified plaque in the bilateral ICAs (L > R) with ~50% stenosis of the left ICA.      Assessment and plan:  # Episode of transient neurologic symptoms. Concern for symptomatic carotid stenosis.   - Continue aspirin and clopidogrel daily.   - Continue atorvastatin 80mg daily.   - Recommend MRI brain without contrast to evaluate for acute infarction.   - Also recommend bilateral CUS and vascular surgery consultation.           Signature  Sofía Navarro MD        This encounter was conducted as an interprofessional communication between providers at the spoke and vascular neurologist. The interaction was completed over the phone or via secure messaging (electronic medical record - [employer] Secure Chat).     Once this note was completed, a written copy was sent back to the provider via fax or electronic medical record.    I spent approximately 15 minutes on this encounter. More than half of that time was spent communicating with the consulting provider and coordinating patient care.

## 2024-03-06 NOTE — SUBJECTIVE & OBJECTIVE
HPI:  84 y.o. male with a PMHx significant for DM, HTN presenting with slurred speech at 1330, some word-finding difficulties.     Resolved prior to arrival      Images personally reviewed and interpreted:  Study: {Study:71191}  Study Interpretation: ***     Assessment and plan:  ***    Lytics recommendation: {THROMBOLYTIC THERAPY RECOMMENDATION:09953}  Thrombectomy recommendation: {INTERVENTIONAL REVASCULARIZATION CANDIDATE:14685}  Placement recommendation: {TELESTROKE DISPOSITION RECOMMENDATION:32006}

## 2024-03-07 PROBLEM — I16.9 HYPERTENSIVE CRISIS: Status: ACTIVE | Noted: 2019-11-12

## 2024-03-07 PROBLEM — I67.4 HYPERTENSIVE ENCEPHALOPATHY: Status: ACTIVE | Noted: 2024-03-07

## 2024-03-07 LAB
POCT GLUCOSE: 150 MG/DL (ref 70–110)
POCT GLUCOSE: 163 MG/DL (ref 70–110)
POCT GLUCOSE: 224 MG/DL (ref 70–110)
POCT GLUCOSE: 246 MG/DL (ref 70–110)
POCT GLUCOSE: 250 MG/DL (ref 70–110)
POCT GLUCOSE: 269 MG/DL (ref 70–110)

## 2024-03-07 PROCEDURE — 82962 GLUCOSE BLOOD TEST: CPT

## 2024-03-07 PROCEDURE — 25000003 PHARM REV CODE 250: Performed by: NURSE PRACTITIONER

## 2024-03-07 PROCEDURE — 94761 N-INVAS EAR/PLS OXIMETRY MLT: CPT

## 2024-03-07 PROCEDURE — 63600175 PHARM REV CODE 636 W HCPCS: Performed by: NURSE PRACTITIONER

## 2024-03-07 PROCEDURE — 99900035 HC TECH TIME PER 15 MIN (STAT)

## 2024-03-07 PROCEDURE — 25000003 PHARM REV CODE 250: Performed by: HOSPITALIST

## 2024-03-07 PROCEDURE — 11000001 HC ACUTE MED/SURG PRIVATE ROOM

## 2024-03-07 RX ORDER — AMLODIPINE BESYLATE 5 MG/1
10 TABLET ORAL DAILY
Status: DISCONTINUED | OUTPATIENT
Start: 2024-03-07 | End: 2024-03-08 | Stop reason: HOSPADM

## 2024-03-07 RX ORDER — TAMSULOSIN HYDROCHLORIDE 0.4 MG/1
0.4 CAPSULE ORAL NIGHTLY
Status: DISCONTINUED | OUTPATIENT
Start: 2024-03-07 | End: 2024-03-08 | Stop reason: HOSPADM

## 2024-03-07 RX ORDER — LOSARTAN POTASSIUM 25 MG/1
50 TABLET ORAL DAILY
Status: DISCONTINUED | OUTPATIENT
Start: 2024-03-07 | End: 2024-03-08 | Stop reason: HOSPADM

## 2024-03-07 RX ORDER — ATORVASTATIN CALCIUM 40 MG/1
80 TABLET, FILM COATED ORAL DAILY
Status: DISCONTINUED | OUTPATIENT
Start: 2024-03-07 | End: 2024-03-08 | Stop reason: HOSPADM

## 2024-03-07 RX ORDER — IBUPROFEN 200 MG
16 TABLET ORAL
Status: DISCONTINUED | OUTPATIENT
Start: 2024-03-07 | End: 2024-03-08 | Stop reason: HOSPADM

## 2024-03-07 RX ORDER — GLUCAGON 1 MG
1 KIT INJECTION
Status: DISCONTINUED | OUTPATIENT
Start: 2024-03-07 | End: 2024-03-08 | Stop reason: HOSPADM

## 2024-03-07 RX ORDER — IBUPROFEN 200 MG
24 TABLET ORAL
Status: DISCONTINUED | OUTPATIENT
Start: 2024-03-07 | End: 2024-03-08 | Stop reason: HOSPADM

## 2024-03-07 RX ORDER — FAMOTIDINE 20 MG/1
20 TABLET, FILM COATED ORAL DAILY
Status: DISCONTINUED | OUTPATIENT
Start: 2024-03-07 | End: 2024-03-08 | Stop reason: HOSPADM

## 2024-03-07 RX ORDER — ASPIRIN 81 MG/1
81 TABLET ORAL DAILY
Status: DISCONTINUED | OUTPATIENT
Start: 2024-03-07 | End: 2024-03-08 | Stop reason: HOSPADM

## 2024-03-07 RX ORDER — INSULIN ASPART 100 [IU]/ML
0-10 INJECTION, SOLUTION INTRAVENOUS; SUBCUTANEOUS
Status: DISCONTINUED | OUTPATIENT
Start: 2024-03-07 | End: 2024-03-08 | Stop reason: HOSPADM

## 2024-03-07 RX ORDER — CLOPIDOGREL BISULFATE 75 MG/1
75 TABLET ORAL DAILY
Status: DISCONTINUED | OUTPATIENT
Start: 2024-03-07 | End: 2024-03-08 | Stop reason: HOSPADM

## 2024-03-07 RX ORDER — HYDRALAZINE HYDROCHLORIDE 25 MG/1
25 TABLET, FILM COATED ORAL EVERY 8 HOURS PRN
Status: DISCONTINUED | OUTPATIENT
Start: 2024-03-07 | End: 2024-03-07

## 2024-03-07 RX ORDER — HYDRALAZINE HYDROCHLORIDE 20 MG/ML
10 INJECTION INTRAMUSCULAR; INTRAVENOUS EVERY 6 HOURS PRN
Status: DISCONTINUED | OUTPATIENT
Start: 2024-03-07 | End: 2024-03-08 | Stop reason: HOSPADM

## 2024-03-07 RX ORDER — HYDRALAZINE HYDROCHLORIDE 25 MG/1
50 TABLET, FILM COATED ORAL EVERY 8 HOURS PRN
Status: DISCONTINUED | OUTPATIENT
Start: 2024-03-07 | End: 2024-03-08 | Stop reason: HOSPADM

## 2024-03-07 RX ADMIN — ATORVASTATIN CALCIUM 80 MG: 40 TABLET, FILM COATED ORAL at 09:03

## 2024-03-07 RX ADMIN — ASPIRIN 81 MG: 81 TABLET ORAL at 09:03

## 2024-03-07 RX ADMIN — INSULIN ASPART 2 UNITS: 100 INJECTION, SOLUTION INTRAVENOUS; SUBCUTANEOUS at 02:03

## 2024-03-07 RX ADMIN — LOSARTAN POTASSIUM 50 MG: 25 TABLET, FILM COATED ORAL at 11:03

## 2024-03-07 RX ADMIN — FAMOTIDINE 20 MG: 20 TABLET, FILM COATED ORAL at 09:03

## 2024-03-07 RX ADMIN — HYDRALAZINE HYDROCHLORIDE 25 MG: 25 TABLET, FILM COATED ORAL at 11:03

## 2024-03-07 RX ADMIN — TAMSULOSIN HYDROCHLORIDE 0.4 MG: 0.4 CAPSULE ORAL at 01:03

## 2024-03-07 RX ADMIN — CLOPIDOGREL BISULFATE 75 MG: 75 TABLET, FILM COATED ORAL at 09:03

## 2024-03-07 RX ADMIN — INSULIN DETEMIR 15 UNITS: 100 INJECTION, SOLUTION SUBCUTANEOUS at 09:03

## 2024-03-07 RX ADMIN — INSULIN ASPART 4 UNITS: 100 INJECTION, SOLUTION INTRAVENOUS; SUBCUTANEOUS at 04:03

## 2024-03-07 RX ADMIN — AMLODIPINE BESYLATE 10 MG: 5 TABLET ORAL at 11:03

## 2024-03-07 RX ADMIN — TAMSULOSIN HYDROCHLORIDE 0.4 MG: 0.4 CAPSULE ORAL at 09:03

## 2024-03-07 NOTE — ED PROVIDER NOTES
Encounter Date: 3/6/2024       History     Chief Complaint   Patient presents with    Altered Mental Status     Pt comes in today via ems with a c/o slurred speech starting at 130 lasting about 45 min.     84-year-old male presents with difficulty word finding.  Onset today around 1:30 p.m., duration about 1 hour and resolved and back to normal neurological baseline.  Code stroke initiated on arrival    The history is provided by the patient and the EMS personnel.     Review of patient's allergies indicates:  No Known Allergies  Past Medical History:   Diagnosis Date    Diabetes mellitus     Hypertension     TIA (transient ischemic attack)     Urinary retention      Past Surgical History:   Procedure Laterality Date    CATARACT EXTRACTION Right     GLAUCOMA SURGERY Right      Family History   Family history unknown: Yes     Social History     Tobacco Use    Smoking status: Never    Smokeless tobacco: Never   Substance Use Topics    Alcohol use: Not Currently     Review of Systems   All other systems reviewed and are negative.      Physical Exam     Initial Vitals [03/06/24 1544]   BP Pulse Resp Temp SpO2   (!) 142/83 80 18 97.6 °F (36.4 °C) 99 %      MAP       --         Physical Exam    Nursing note and vitals reviewed.  Constitutional: Vital signs are normal.  Non-toxic appearance. No distress.   HENT:   Head: Normocephalic.   Eyes: No scleral icterus.   Cardiovascular:  Regular rhythm.           Pulmonary/Chest: No stridor. No respiratory distress.   Bilateral chest rise   Abdominal: There is no guarding.   Musculoskeletal:         General: No tenderness.      Cervical back: No rigidity.     Neurological: He is alert.   NIH scale of 0, No associated focal motor or sensory deficit   Skin: Skin is warm and dry. No rash noted.   Psychiatric: His speech is normal. He is not actively hallucinating.   Not anxious  or agitated         ED Course   Procedures  Labs Reviewed   CBC W/ AUTO DIFFERENTIAL - Abnormal; Notable  for the following components:       Result Value    RBC 3.62 (*)     Hemoglobin 12.0 (*)     Hematocrit 35.9 (*)     MCV 99 (*)     MCH 33.1 (*)     MPV 8.8 (*)     Immature Granulocytes 0.7 (*)     Immature Grans (Abs) 0.06 (*)     All other components within normal limits   COMPREHENSIVE METABOLIC PANEL - Abnormal; Notable for the following components:    eGFR 50 (*)     Anion Gap 7 (*)     All other components within normal limits   LIPID PANEL - Abnormal; Notable for the following components:    Cholesterol 112 (*)     HDL 39 (*)     LDL Cholesterol 55.4 (*)     All other components within normal limits   ISTAT PROCEDURE - Abnormal; Notable for the following components:    POC PTINR 1.3 (*)     All other components within normal limits   PROTIME-INR   TSH   POCT GLUCOSE, HAND-HELD DEVICE   POCT GLUCOSE   POCT PROTIME-INR          Imaging Results              CTA Head and Neck (xpd) (Final result)  Result time 03/06/24 15:57:20      Final result by Lewis Kohler MD (03/06/24 15:57:20)                   Impression:      1. There is no acute abnormality or definite change in the appearance of the brain compared to the prior CT.  There is generalized volume loss with a marked burden of nonspecific white matter change.  There is, however, no hemorrhage, mass or obvious acute infarction.  It should be noted that MRI is more sensitive in the detection of subtle or acute nonhemorrhagic ischemic disease.  2. There is plaque at the carotid bulb bilaterally but there is no critical stenosis.  Stenosis of the proximal right internal carotid artery is approximately 40% and on the left approximately 50%.  3. The vertebral arteries are patent.  4. Intracranially, there is no large vessel occlusion or critical stenosis.      Electronically signed by: Lewis Kohler MD  Date:    03/06/2024  Time:    15:57               Narrative:    EXAMINATION:  CTA HEAD AND NECK (XPD)    CLINICAL HISTORY:  Neuro deficit, acute,  stroke suspected;    TECHNIQUE:  Non contrast low dose axial images were obtained thought the head.  CT angiogram was performed from the level of the heather to the top of the head following the IV administration of 75 mL of Omnipaque 350.   Sagittal and coronal reconstructions and maximum intensity projection reconstructions were performed. Arterial stenosis percentages are based on NASCET measurement criteria.    COMPARISON:  Head CT dated 12/21/2021    FINDINGS:  Head CT:    There is no acute abnormality or detectable change in the appearance of the brain compared to the prior study.  There is generalized volume loss with a marked burden of diffuse and confluent white matter hypodensity.  The white matter changes are nonspecific but may reflect sequelae of rather severe chronic small vessel disease.  There is no intracranial hemorrhage.  There is no mass.  The gray-white interface appears preserved without acute infarction.  There is no abnormal extra-axial fluid collection.  There is no abnormal enhancement in the brain on the delayed post-contrast images.    The nasal septum is mildly bowed to the left.  There is scattered mucosal thickening in the ethmoid air cells with a small mucous retention cyst in the left sphenoid sinus.  The mastoid air cells are clear.  There is calcified plaque in the vessels at the base of the brain.  The calvarium is normal.    CTA Neck:    Arch and great vessels:    There is a left-sided 3 vessel arch with common origin of the brachiocephalic trunk and left common carotid artery.  The aortic arch and the origins of the great vessels are widely patent.  The included subclavian arteries are patent as well.    Carotid arteries:    Right Carotid artery: There is calcified plaque at the carotid bulb and proximal internal carotid artery but stenosis of the internal carotid artery is approximately 40%.  The internal carotid artery is patent to the skull base.    Left Carotid artery: There  is mild scattered plaque in the common carotid artery.  There is also plaque at the carotid bulb and proximal internal carotid artery.  Stenosis of the proximal internal carotid artery is approximately 50%.  The vessel is patent to the skull base.    Vertebral arteries:    The left vertebral artery is slightly dominant.  Both vertebral arteries are patent throughout their course in the neck.  There is no critical stenosis, occlusion, thrombus or obvious dissection.    Other:    There is no focal infiltrate or mass in the included upper lungs.  There is respiratory motion.  The airway is patent.  There is no pathologic lymphadenopathy or dominant soft tissue mass in the neck.    CTA Head:    Anterior circulation:    The petrous segments of the internal carotid arteries are patent.  There is calcified plaque in the cavernous segments bilaterally resulting in intermittent nonocclusive stenosis.  The supraclinoid internal carotid arteries are patent.  The carotid terminus is normal bilaterally.  There is branching into the anterior and middle cerebral arteries.  There is no large vessel occlusion or critical stenosis.  There is no large aneurysm.  There is no obvious thrombus or dissection.    Posterior circulation:    The vertebral and basilar arteries are patent.  The basilar tip is normal.  There is branching into the superior cerebellar and posterior cerebral arteries.  Faint bilateral posterior communicating arteries are present.  There is no large vessel occlusion or critical stenosis.  There is no thrombus, dissection or large aneurysm.    Dural sinuses, other:    The dural sinuses are patent.                                       Medications   clopidogreL tablet 75 mg (has no administration in time range)   aspirin tablet 325 mg (325 mg Oral Given 3/6/24 1857)     Medical Decision Making  84-year-old male presents with stroke-like symptoms and resolved just prior to arrival.  Code stroke initiated and spoke with  neurologist .  Patient not a tPA candidate back to baseline, CT imaging with stenosis of carotid vasculature worse on left side.  Dr. Navarro recommends transfer to [facility] for further neurovascular evaluation.  Spoke with transfer center no beds available within the next 24 hours.  Patient will hold in ED for now, notified hospitalist team that they will be consulted for further medical management while patient is boarding in ED. they stated we have to wait 6 hours which would be 10:42 p.m. for which they can be consulted.  Patient updated and agrees with plan started on aspirin and Plavix.    Amount and/or Complexity of Data Reviewed  Labs: ordered. Decision-making details documented in ED Course.  Radiology: ordered.  ECG/medicine tests: ordered and independent interpretation performed.     Details: Rate 84, QRS 86, , no STEMI    Risk  OTC drugs.  Prescription drug management.               ED Course as of 03/06/24 2300   Wed Mar 06, 2024   1535 Patient had slurred speech starting at 1:30 a.m. today which resolved just prior to arrival [KB]   1554 Spoke with neurologist Dr. Navarro she reports no acute hemorrhage, if carotid vasculature narrowing greater than 50% will likely transfer out for vascular evaluation of less than 50% will plan for admission here to [facility] for further TIA workup. [KB]   1600 WBC: 8.85 [KB]   1600 Hemoglobin(!): 12.0 [KB]   1600 Hematocrit(!): 35.9 [KB]   1600 POC PTINR(!): 1.3 [KB]   1600 INR: 1.1 [KB]   1655 Patient accepted to [facility] vascular Neurology at 4:42 p.m. by Dr. Navarro.  Spoke with [facility] transfer [place] patient will not be able to go to [facility] has no bed available best case scenario patient will go sometime tomorrow 03/07/2024 [KB]   1711 Spoke with Kerri Sykes hospitalist team.  They will be available to be consulted for medical management at 10:42 p.m., 6 hours after accepted admission [KB]   6837 TSH:  1.360 [KB]   1717 Cholesterol Total(!): 112 [KB]   1717 HDL(!): 39 [KB]   1717 LDL Cholesterol(!): 55.4 [KB]   1717 Sodium: 138 [KB]   1717 Chloride: 108 [KB]   1717 Potassium: 4.0 [KB]   1717 Glucose: 101 [KB]   1717 BUN: 18 [KB]   1717 Creatinine: 1.4 [KB]   1717 eGFR(!): 50 [KB]   1717 Anion Gap(!): 7 [KB]   1833     Thrombolysis Candidate? No, Patient back to neurological baseline     Delays to Thrombolysis?  No   [KB]   1959 Spoke with hospitalist team Kerri Castro, they will consult for medical management of patient and TIA management while waiting in ED for transfer to [facility]. [KB]   2044 [facility] Dr. Blanchard  for further management and evaluation while pending transfer to [facility] [KB]   2259 Spoke with Dulce Wesley nurse practitioner with hospitalist team confirmed consult for medical management for TIA while awaiting transfer to [facility] for further neurological evaluate [KB]      ED Course User Index  [KB] Kalpesh Meyer Jr., DO                           Clinical Impression:  Final diagnoses:  [I65.22] Stenosis of left carotid artery (Primary)  [R47.01] Expressive aphasia  [G45.9] TIA (transient ischemic attack)          ED Disposition Condition    Transfer to Another Facility Stable                Kalpesh Meyer Jr., DO  03/06/24 2045       Kalpesh Meyer Jr., DO  03/06/24 2300

## 2024-03-07 NOTE — ASSESSMENT & PLAN NOTE
MRI reviewed negative for CVA.  Patient's symptoms is more consistent with hypertensive encephalopathy, now mental status back to his baseline.

## 2024-03-07 NOTE — ASSESSMENT & PLAN NOTE
Patient's FSGs are controlled on current medication regimen.  Last A1c reviewed-   Lab Results   Component Value Date    HGBA1C 6.2 (H) 02/09/2024     Most recent fingerstick glucose reviewed-   Recent Labs   Lab 03/06/24  1524   POCTGLUCOSE 99     Current correctional scale  Medium  Maintain anti-hyperglycemic dose as follows-   Antihyperglycemics (From admission, onward)      Start     Stop Route Frequency Ordered    03/07/24 0201  insulin aspart U-100 pen 0-10 Units         -- SubQ Before meals & nightly PRN 03/07/24 0101          Hold Oral hypoglycemics while patient is in the hospital.

## 2024-03-07 NOTE — ASSESSMENT & PLAN NOTE
Plan to transfer to [facility] [place] for vascular consultation    Assessment and plan:  # Episode of transient neurologic symptoms. Concern for symptomatic carotid stenosis.   - Continue aspirin and clopidogrel daily.   - Continue atorvastatin 80mg daily.   - Recommend MRI brain without contrast to evaluate for acute infarction.   - Also recommend bilateral CUS and vascular surgery consultation.     Symptoms have completely resolved

## 2024-03-07 NOTE — CONSULTS
[facility]  Department of Neurology  Neurology Consultation Note        PATIENT NAME: Evelio Pandya  MRN: [id number]  CSN: [id number]      TODAY'S DATE: 03/07/2024  ADMIT DATE: 3/6/2024                            CONSULTING PROVIDER: Ru Douglass MD  CONSULT REQUESTED BY: No att. providers found      Reason for consult:  Episode of encephalopathy       History obtained from chart review, the patient, and family.    HPI per EMR: Evelio Pandya is a 84 y.o. male with a history of DM, HTN, TIA who presented to the ED with stroke-like symptoms that resolved just prior to arrival. He reported slurred speech as well as word finding difficulties at around 130 pm today, but have since resolved. He states he has had similar episodes several times in the past month. He denied other complaint. A Code stroke was initiated on arrival to the ED, and the ED physician Betsy spoke with neurologist . The patient was found to not be a tPA candidate as he was back to baseline. CT imaging did not show an acute stroke, but did show stenosis of carotid vasculature of 40% on right and 50% on left side. Dr. Navarro recommended transfer to [facility] for further neurovascular evaluation, continue aspirin, Plavix and atorvastatin, obtain an MRI and Bilateral carotid ultrasound. Transfer center was contacted to arrange transfer, but no beds available within the next 24 hours. Patient will hold in ED for now, hospital medicine consulted.     Neurology consult:  Patient was seen examined by me.  History was obtained by family at bedside and over phone.  Apparently yesterday, patient had an episode of confusion where he was not able to communicate and was repeating himself.  There was no slurred speech or focal weakness at that time.  Patient did not have any sensory changes or weakness or vision trouble prior to the episode.  As per family, patient is noncompliant with his medications at home.  By the time he  "presented to the ER, his symptoms have improved and he was back to baseline.    Per family, he has had multiple episodes of confusion over the past 2 years and the 1st episode was after urinary tract infection.  There was no history of stroke in the past.  There was concern for TIAs in the past.    Currently patient is back to baseline and he denies any complaints at this time.  During my encounter, his blood pressure was in 200s systolic.    PREVIOUS MEDICAL HISTORY:  Past Medical History:   Diagnosis Date    Diabetes mellitus     Hypertension     TIA (transient ischemic attack)     Urinary retention      PREVIOUS SURGICAL HISTORY:  Past Surgical History:   Procedure Laterality Date    CATARACT EXTRACTION Right     GLAUCOMA SURGERY Right      FAMILY MEDICAL HISTORY:  Family History   Family history unknown: Yes     SOCIAL HISTORY:  Social History     Tobacco Use    Smoking status: Never    Smokeless tobacco: Never   Substance Use Topics    Alcohol use: Not Currently     ALLERGIES:  Review of patient's allergies indicates:  No Known Allergies  HOME MEDICATIONS:  Prior to Admission medications    Medication Sig Start Date End Date Taking? Authorizing Provider   amLODIPine (NORVASC) 10 MG tablet Take 10 mg by mouth. 6/5/23   Provider, Historical   aspirin (ECOTRIN) 81 MG EC tablet Take 81 mg by mouth once daily.    Provider, Historical   atorvastatin (LIPITOR) 80 MG tablet Take 1 tablet (80 mg total) by mouth once daily. 10/25/22   Arnoldo Still, NP   BD ULTRA-FINE SHORT PEN NEEDLE 31 gauge x 5/16" Ndle Insulin injection 4 times a day 10/11/23   Arnoldo Still, NP   brimonidine 0.2% (ALPHAGAN) 0.2 % Drop Place 1 drop into both eyes every 12 (twelve) hours. 12/7/23   Han Peters MD   clopidogreL (PLAVIX) 75 mg tablet TAKE 1 TABLET(75 MG) BY MOUTH EVERY DAY 5/18/23   Arnoldo Still, NP   dorzolamide-timolol 2-0.5% (COSOPT) 22.3-6.8 mg/mL ophthalmic solution Place 1 drop into both eyes every 12 (twelve) " hours. 23   Han Peters MD   gabapentin (NEURONTIN) 300 MG capsule Take 300 mg by mouth every evening. 22   Provider, Historical   insulin detemir U-100, Levemir, (LEVEMIR FLEXPEN) 100 unit/mL (3 mL) InPn pen Inject 15 Units into the skin every evening. 24  Arnoldo Still, NP   NOVOLOG FLEXPEN U-100 INSULIN 100 unit/mL (3 mL) InPn pen SMARTSI Unit(s) SUB-Q 3 Times Daily 24   Arnoldo Still, NP   tamsulosin (FLOMAX) 0.4 mg Cap Take 1 capsule (0.4 mg total) by mouth every evening. 24  Natali East, BERTIN   travoprost (TRAVATAN Z) 0.004 % ophthalmic solution Place 1 drop into both eyes every evening. 23   Han Peters MD   TRUE METRIX GLUCOSE TEST STRIP Strp 3 (three) times daily. Use to test blood glucose 23   Arnoldo Still, NP   TRUEPLUS LANCETS 33 gauge Misc  9/15/23   Provider, Historical     CURRENT SCHEDULED MEDICATIONS:   aspirin  81 mg Oral Daily    atorvastatin  80 mg Oral Daily    clopidogreL  75 mg Oral Daily    famotidine  20 mg Oral Daily    insulin detemir U-100 (Levemir)  15 Units Subcutaneous QHS    tamsulosin  0.4 mg Oral QHS     CURRENT INFUSIONS:    CURRENT PRN MEDICATIONS:  dextrose 10%, dextrose 10%, glucagon (human recombinant), glucose, glucose, insulin aspart U-100    REVIEW OF SYSTEMS:  Please refer to the HPI for all pertinent positive and negative findings. A comprehensive review of all other systems was negative.       PHYSICAL EXAM:  Patient Vitals for the past 24 hrs:   BP Temp Temp src Pulse Resp SpO2 Height Weight   24 1030 (!) 177/91 98.3 °F (36.8 °C) Oral 86 18 99 % -- --   24 1015 -- -- -- 73 -- 98 % -- --   24 0935 (!) 170/72 -- -- 75 17 99 % -- --   24 0902 (!) 141/89 -- -- 77 -- 97 % -- --   24 0833 (!) 153/76 -- -- 74 -- 100 % -- --   24 0602 (!) 165/92 -- -- 80 -- 98 % -- --   24 0502 (!) 177/98 -- -- 79 -- 99 % -- --   24 0430 (!) 184/91 -- -- 78 -- 98 % --  "--   03/07/24 0400 (!) 181/96 -- -- 77 18 98 % -- --   03/07/24 0334 (!) 181/96 -- -- 77 -- 98 % -- --   03/07/24 0304 (!) 141/57 -- -- 72 -- 96 % -- --   03/07/24 0232 (!) 142/60 -- -- 71 -- 99 % -- --   03/07/24 0129 (!) 160/90 -- -- 80 -- 99 % -- --   03/07/24 0100 (!) 177/88 -- -- 79 18 99 % -- --   03/07/24 0030 (!) 174/75 -- -- 79 18 96 % -- --   03/07/24 0000 (!) 152/86 -- -- 83 18 99 % -- --   03/06/24 2330 (!) 163/85 -- -- 86 18 98 % -- --   03/06/24 2300 (!) 182/86 -- -- 86 18 98 % -- --   03/06/24 2009 -- -- -- 69 18 98 % -- --   03/06/24 2000 (!) 170/78 -- -- 70 18 99 % -- --   03/06/24 1932 (!) 194/94 -- -- 71 18 96 % -- --   03/06/24 1908 (!) 174/95 -- -- 71 18 100 % -- --   03/06/24 1830 (!) 178/91 -- -- 81 18 98 % -- --   03/06/24 1800 (!) 140/114 -- -- 82 18 97 % -- --   03/06/24 1732 (!) 150/98 -- -- 84 18 98 % -- --   03/06/24 1703 (!) 165/79 -- -- 84 18 99 % -- --   03/06/24 1632 (!) 166/85 -- -- 82 18 99 % -- --   03/06/24 1602 135/74 -- -- 85 -- 99 % -- --   03/06/24 1550 135/74 -- -- 84 18 99 % -- --   03/06/24 1544 (!) 142/83 97.6 °F (36.4 °C) Oral 80 18 99 % 5' 6" (1.676 m) 92.5 kg (204 lb)       GENERAL APPEARANCE: Alert, well-developed, well-nourished male in no acute distress.  HEENT: Normocephalic and atraumatic. PERRL (baseline exotropia on the right eye). Oropharynx unremarkable.  PULM: Normal respiratory effort. No accessory muscle use.  CV: RRR.  ABDOMEN: Soft, nontender.  EXTREMITIES: No obvious signs of vascular compromise. Pulses present. No cyanosis, clubbing or edema.  SKIN: Clear; no rashes, lesions or skin breaks in exposed areas.    NEURO:  MENTAL STATUS: Patient awake and oriented to time, place, and person, recent/remote memory normal, attention span/concentration normal, and speech fluent without paraphasic errors.  Affect euthymic.    CRANIAL NERVES:  CN I: Not tested.  CN II: Fundoscopic exam deferred.  CN III, IV, VI: Pupils equal, round and reactive to light.  " Extraocular movements full and intact.  Baseline exotropia on the right eye  CN V: Facial sensation normal.  CN VII: Facial asymmetry absent.  CN VIII: Hearing grossly normal and equal bilaterally.  No skew deviation or pathologic nystagmus.  CN IX, X: Palate elevates symmetrically. Speech/articulation is clear without dysarthria.  CN XI: Shoulder shrug and chin rotation equal with good strength.  CN XII: Tongue protrusion midline.    MOTOR:  Bulk normal. Tone normal and symmetric throughout.  Abnormal movements absent.  Tremor: none present.  Strength 5/5 throughout.    REFLEXES:  DTRs 2+ throughout.  Plantar response downgoing bilaterally.  SENSATION: grossly intact throughout.  COORDINATION: normal finger-to-nose.  STATION: not tested.  GAIT: not tested.      NIHSS:  1a      Level of Consciousness (alert, drowsy, etc.):   0=alert; keenly responsive  1b.     Level of Consciousness Questions (month, age): 0= able to answer both questions  1c.     Level of Consciousness Commands (open, close eyes, make fist, let go):  0=Answers both tasks correctly  2.      Best Gaze (eyes open - patient follows examiner's finger or face):      0=normal  3.      Visual (introduce visual stimulus/threat to patient's visual field quadrants):  0=No visual loss  4.      Facial Palsy (show teeth, raise eyebrows and squeeze eyes shut):        0=Normal symmetric movement  5a.     Motor Arm - Left (elevate extremity 90 degrees and score drift/movement):       0=No drift, limb holds 90 (or 45) degrees for full 10 seconds  5b.     Motor Arm - Right (elevate extremity 90 degrees and score drift/movement):      0=No drift, limb holds 90 (or 45) degrees for full 10 seconds  6a.     Motor Leg - Left (elevate extremity 30 degrees and score drift/movement):       0=No drift, limb holds 90 (or 45) degrees for full 10 seconds  6b      Motor Leg - Right (elevate extremity 30 degrees and score drift/movement):      0=No drift, limb holds 90 (or 45)  "degrees for full 10 seconds  7.      Limb Ataxia (finger-nose, heel down shin):      0=Absent  8.      Sensory (pin prick to face, arm, trunk, and leg - compare side to side):        0=Normal; no sensory loss  9.      Best Language (name items, describe a picture and read sentences):      0=No aphasia, normal  10.     Dysarthria (evaluate speech clarity by patient repeating listed words): 0=Normal  11.     Extinction and Inattention (use prior testing to identify neglect or double simultaneous stimuli testing):      0=No abnormality          NIH Stroke Scale Total:         0      Labs:  Recent Labs   Lab 03/06/24  1538      K 4.0      CO2 23   BUN 18   CREATININE 1.4      CALCIUM 8.9     Recent Labs   Lab 03/06/24  1538   WBC 8.85   HGB 12.0*   HCT 35.9*        Recent Labs   Lab 03/06/24  1538   ALBUMIN 3.6   PROT 6.9   BILITOT 0.9   ALKPHOS 56   ALT 14   AST 13     Lab Results   Component Value Date    INR 1.1 03/06/2024     Lab Results   Component Value Date    TRIG 88 03/06/2024    HDL 39 (L) 03/06/2024    CHOLHDL 34.8 03/06/2024     Lab Results   Component Value Date    HGBA1C 6.2 (H) 02/09/2024     No results found for: "PROTEINCSF", "GLUCCSF", "WBCCSF"    Imaging:  I have reviewed and interpreted the pertinent imaging and lab results.      MRI Brain Without Contrast  Narrative: EXAMINATION:  MRI BRAIN WITHOUT CONTRAST    CLINICAL HISTORY:  Stroke, follow up;.    TECHNIQUE:  Multiplanar multisequence MR imaging of the brain was performed without contrast    COMPARISON:  CTA 03/06/2024.  MRI (report only) 09/07/2022.    FINDINGS:  Brain: No acute infarct or intracranial hemorrhage.  No midline shift or mass effect or space-occupying extra-axial fluid collection.  Redemonstrated confluent T2/FLAIR hyperintensity throughout the cerebral hemispheric white matter consistent with moderate to severe chronic small vessel ischemic changes as well as chronic lacunar type infarcts within the " bilateral cerebellum and bilateral centrum semiovale.  Moderate global involutional changes.  Midline Structures: The midline structures of the brain are normal.  Ventricles: The ventricles, sulci and cisterns are within normal limits.  Vasculature: The vascular flow voids at the base of the brain are within normal limits.  Calvarium: The visualized osseous structures are unremarkable.  Sinuses: The paranasal sinuses are adequately aerated.  Orbits: No significant abnormality.  Mastoids: Trace left mastoid effusion.  Extracranial soft tissues: The visualized extracranial soft tissues are unremarkable.  Impression: 1. No recent infarct or other acute intracranial process.  2. Moderate to advanced chronic small vessel ischemic changes.    Electronically signed by: Jet Max  Date:    03/07/2024  Time:    08:47  US Carotid Bilateral  Narrative: EXAMINATION:  US CAROTID BILATERAL    CLINICAL HISTORY:  carotid stenosis, stroke work up;    TECHNIQUE:  Grayscale and color Doppler ultrasound examination of the carotid and vertebral artery systems bilaterally.  Stenosis estimates are per the NASCET measurement criteria.    COMPARISON:  None    FINDINGS:  Right:    Internal Carotid Artery (ICA) peak systolic velocity 99 cm/sec    ICA/CCA peak systolic velocity ratio: 1.1    Plaque formation: Appears moderate    Vertebral artery: Antegrade flow and normal waveform.    Left:    Internal Carotid Artery (ICA)  peak systolic velocity 102 cm/sec    ICA/CCA peak systolic velocity ratio: 1.7    Plaque formation: Appears moderate    Vertebral artery: Antegrade flow and normal waveform.  Impression: No evidence of a hemodynamically significant carotid bifurcation stenosis.    Flow in vertebral arteries appears antegrade bilaterally.    Electronically signed by: Anny Hopkins MD  Date:    03/07/2024  Time:    08:37         ASSESSMENT & PLAN:    Encephalopathy   Hypertensive emergency    Plan:   Patient had an episode of encephalopathy  versus aphasia.  Etiology could likely be hypertensive emergency versus TIA.  CT angiogram showed bilateral carotid stenosis in the moderate range about 40% on the right and 50% on the left.  MRI brain was negative for acute pathology.  Carotid ultrasound showed no high-grade stenosis.  Slowly normalize blood pressure.  Recommend aspirin 81 mg, Plavix 75 mg and Lipitor 40 mg for stroke prevention.  Dual antiplatelet therapy for 3 weeks followed by monotherapy with aspirin.  Educated patient about medication compliance  Outpatient vascular surgery evaluation for carotid stenosis.  PT OT and speech therapy evaluate and treat  Risk factor stratification with lipid panel and hemoglobin A1c  Extensively discussed lifestyle modifications as prophylactic measures for stroke prevention including, adequate blood pressure management, healthy diet and regular exercise.         Thank you kindly for including us in the care of this patient. Please do not hesitate to contact us with any questions.        Ru Douglass MD  Neurology/vascular Neurology  Date of Service: 03/07/2024  10:51 AM    --------------------------------------------------------------------------------------------------------------------------------------------------------------------------------------------------------------------------------------------------------------  Please note: This note was transcribed using voice recognition software. Because of this technology there are often uinintended grammatical, spelling, and other transcription errors. Please disregard these errors.

## 2024-03-07 NOTE — SUBJECTIVE & OBJECTIVE
"Past Medical History:   Diagnosis Date    Diabetes mellitus     Hypertension     TIA (transient ischemic attack)     Urinary retention        Past Surgical History:   Procedure Laterality Date    CATARACT EXTRACTION Right     GLAUCOMA SURGERY Right        Review of patient's allergies indicates:  No Known Allergies    No current facility-administered medications on file prior to encounter.     Current Outpatient Medications on File Prior to Encounter   Medication Sig    amLODIPine (NORVASC) 10 MG tablet Take 10 mg by mouth.    aspirin (ECOTRIN) 81 MG EC tablet Take 81 mg by mouth once daily.    atorvastatin (LIPITOR) 80 MG tablet Take 1 tablet (80 mg total) by mouth once daily.    BD ULTRA-FINE SHORT PEN NEEDLE 31 gauge x 5/16" Ndle Insulin injection 4 times a day    brimonidine 0.2% (ALPHAGAN) 0.2 % Drop Place 1 drop into both eyes every 12 (twelve) hours.    clopidogreL (PLAVIX) 75 mg tablet TAKE 1 TABLET(75 MG) BY MOUTH EVERY DAY    dorzolamide-timolol 2-0.5% (COSOPT) 22.3-6.8 mg/mL ophthalmic solution Place 1 drop into both eyes every 12 (twelve) hours.    gabapentin (NEURONTIN) 300 MG capsule Take 300 mg by mouth every evening.    insulin detemir U-100, Levemir, (LEVEMIR FLEXPEN) 100 unit/mL (3 mL) InPn pen Inject 15 Units into the skin every evening.    NOVOLOG FLEXPEN U-100 INSULIN 100 unit/mL (3 mL) InPn pen SMARTSI Unit(s) SUB-Q 3 Times Daily    tamsulosin (FLOMAX) 0.4 mg Cap Take 1 capsule (0.4 mg total) by mouth every evening.    travoprost (TRAVATAN Z) 0.004 % ophthalmic solution Place 1 drop into both eyes every evening.    TRUE METRIX GLUCOSE TEST STRIP Strp 3 (three) times daily. Use to test blood glucose    TRUEPLUS LANCETS 33 gauge Misc      Family History    Family history is unknown by patient.       Tobacco Use    Smoking status: Never    Smokeless tobacco: Never   Substance and Sexual Activity    Alcohol use: Not Currently    Drug use: Not on file    Sexual activity: Not Currently     Review " of Systems   Constitutional:  Negative for chills and fever.   HENT:  Negative for congestion and sore throat.    Eyes:  Negative for visual disturbance.   Respiratory:  Negative for cough and shortness of breath.    Cardiovascular:  Negative for chest pain and palpitations.   Gastrointestinal:  Negative for abdominal pain, constipation, diarrhea, nausea and vomiting.   Endocrine: Negative for cold intolerance and heat intolerance.   Genitourinary:  Negative for dysuria and hematuria.   Musculoskeletal:  Negative for arthralgias and myalgias.   Skin:  Negative for rash.   Neurological:  Positive for speech difficulty. Negative for tremors and seizures.   Hematological:  Negative for adenopathy. Does not bruise/bleed easily.   All other systems reviewed and are negative.    Objective:     Vital Signs (Most Recent):  Temp: 97.6 °F (36.4 °C) (03/06/24 1544)  Pulse: 69 (03/06/24 2009)  Resp: 18 (03/06/24 2009)  BP: (!) 170/78 (03/06/24 2000)  SpO2: 98 % (03/06/24 2009) Vital Signs (24h Range):  Temp:  [97.6 °F (36.4 °C)] 97.6 °F (36.4 °C)  Pulse:  [69-85] 69  Resp:  [18] 18  SpO2:  [96 %-100 %] 98 %  BP: (135-194)/() 170/78     Weight: 92.5 kg (204 lb)  Body mass index is 32.93 kg/m².     Physical Exam  Vitals and nursing note reviewed.   Constitutional:       General: He is awake.      Appearance: Normal appearance. He is well-developed.   HENT:      Head: Normocephalic and atraumatic.      Nose: Nose normal. No septal deviation.   Eyes:      Conjunctiva/sclera: Conjunctivae normal.      Pupils: Pupils are equal, round, and reactive to light.   Neck:      Thyroid: No thyroid mass.      Vascular: No JVD.      Trachea: No tracheal tenderness or tracheal deviation.   Cardiovascular:      Rate and Rhythm: Normal rate and regular rhythm.      Heart sounds: S1 normal and S2 normal. No murmur heard.     No friction rub. No gallop.   Pulmonary:      Effort: Pulmonary effort is normal.      Breath sounds: Normal breath  sounds. No decreased breath sounds, wheezing, rhonchi or rales.   Abdominal:      General: Bowel sounds are normal. There is no distension.      Palpations: Abdomen is soft. There is no hepatomegaly, splenomegaly or mass.      Tenderness: There is no abdominal tenderness.   Skin:     General: Skin is warm.      Findings: No rash.   Neurological:      General: No focal deficit present.      Mental Status: He is alert and oriented to person, place, and time.      Cranial Nerves: No cranial nerve deficit.      Sensory: No sensory deficit.   Psychiatric:         Mood and Affect: Mood normal.         Behavior: Behavior normal. Behavior is cooperative.          Significant Labs: All pertinent labs within the past 24 hours have been reviewed.  A1C:   Recent Labs   Lab 10/11/23  1007 02/09/24  0930   HGBA1C 7.9* 6.2*     CBC:   Recent Labs   Lab 03/06/24  1538   WBC 8.85   HGB 12.0*   HCT 35.9*        CMP:   Recent Labs   Lab 03/06/24  1538      K 4.0      CO2 23      BUN 18   CREATININE 1.4   CALCIUM 8.9   PROT 6.9   ALBUMIN 3.6   BILITOT 0.9   ALKPHOS 56   AST 13   ALT 14   ANIONGAP 7*       Coagulation:   Recent Labs   Lab 03/06/24  1538   INR 1.1       Lipid Panel:   Recent Labs   Lab 03/06/24  1538   CHOL 112*   HDL 39*   LDLCALC 55.4*   TRIG 88   CHOLHDL 34.8       POCT Glucose:   Recent Labs   Lab 03/06/24  1524   POCTGLUCOSE 99       TSH:   Recent Labs   Lab 03/06/24  1538   TSH 1.360     Significant Imaging: I have reviewed all pertinent imaging results/findings within the past 24 hours.  Imaging Results              CTA Head and Neck (xpd) (Final result)  Result time 03/06/24 15:57:20      Final result by Lewis Kohler MD (03/06/24 15:57:20)                   Impression:      1. There is no acute abnormality or definite change in the appearance of the brain compared to the prior CT.  There is generalized volume loss with a marked burden of nonspecific white matter change.  There  is, however, no hemorrhage, mass or obvious acute infarction.  It should be noted that MRI is more sensitive in the detection of subtle or acute nonhemorrhagic ischemic disease.  2. There is plaque at the carotid bulb bilaterally but there is no critical stenosis.  Stenosis of the proximal right internal carotid artery is approximately 40% and on the left approximately 50%.  3. The vertebral arteries are patent.  4. Intracranially, there is no large vessel occlusion or critical stenosis.      Electronically signed by: Lewis Kohler MD  Date:    03/06/2024  Time:    15:57               Narrative:    EXAMINATION:  CTA HEAD AND NECK (XPD)    CLINICAL HISTORY:  Neuro deficit, acute, stroke suspected;    TECHNIQUE:  Non contrast low dose axial images were obtained thought the head.  CT angiogram was performed from the level of the heather to the top of the head following the IV administration of 75 mL of Omnipaque 350.   Sagittal and coronal reconstructions and maximum intensity projection reconstructions were performed. Arterial stenosis percentages are based on NASCET measurement criteria.    COMPARISON:  Head CT dated 12/21/2021    FINDINGS:  Head CT:    There is no acute abnormality or detectable change in the appearance of the brain compared to the prior study.  There is generalized volume loss with a marked burden of diffuse and confluent white matter hypodensity.  The white matter changes are nonspecific but may reflect sequelae of rather severe chronic small vessel disease.  There is no intracranial hemorrhage.  There is no mass.  The gray-white interface appears preserved without acute infarction.  There is no abnormal extra-axial fluid collection.  There is no abnormal enhancement in the brain on the delayed post-contrast images.    The nasal septum is mildly bowed to the left.  There is scattered mucosal thickening in the ethmoid air cells with a small mucous retention cyst in the left sphenoid sinus.  The  mastoid air cells are clear.  There is calcified plaque in the vessels at the base of the brain.  The calvarium is normal.    CTA Neck:    Arch and great vessels:    There is a left-sided 3 vessel arch with common origin of the brachiocephalic trunk and left common carotid artery.  The aortic arch and the origins of the great vessels are widely patent.  The included subclavian arteries are patent as well.    Carotid arteries:    Right Carotid artery: There is calcified plaque at the carotid bulb and proximal internal carotid artery but stenosis of the internal carotid artery is approximately 40%.  The internal carotid artery is patent to the skull base.    Left Carotid artery: There is mild scattered plaque in the common carotid artery.  There is also plaque at the carotid bulb and proximal internal carotid artery.  Stenosis of the proximal internal carotid artery is approximately 50%.  The vessel is patent to the skull base.    Vertebral arteries:    The left vertebral artery is slightly dominant.  Both vertebral arteries are patent throughout their course in the neck.  There is no critical stenosis, occlusion, thrombus or obvious dissection.    Other:    There is no focal infiltrate or mass in the included upper lungs.  There is respiratory motion.  The airway is patent.  There is no pathologic lymphadenopathy or dominant soft tissue mass in the neck.    CTA Head:    Anterior circulation:    The petrous segments of the internal carotid arteries are patent.  There is calcified plaque in the cavernous segments bilaterally resulting in intermittent nonocclusive stenosis.  The supraclinoid internal carotid arteries are patent.  The carotid terminus is normal bilaterally.  There is branching into the anterior and middle cerebral arteries.  There is no large vessel occlusion or critical stenosis.  There is no large aneurysm.  There is no obvious thrombus or dissection.    Posterior circulation:    The vertebral and  basilar arteries are patent.  The basilar tip is normal.  There is branching into the superior cerebellar and posterior cerebral arteries.  Faint bilateral posterior communicating arteries are present.  There is no large vessel occlusion or critical stenosis.  There is no thrombus, dissection or large aneurysm.    Dural sinuses, other:    The dural sinuses are patent.

## 2024-03-07 NOTE — ASSESSMENT & PLAN NOTE
Admit for TIA, carotid stenosis  NIH 0, symptoms have completely resolved  Antithrombotics for secondary stroke prevention: Antiplatelets: Aspirin: 81 mg daily  Clopidogrel: 75 mg daily    Statins for secondary stroke prevention and hyperlipidemia, if present:   Statins: Atorvastatin- 40 mg daily    Aggressive risk factor modification: HTN, DM, Obesity       Diagnostics ordered/pending: Carotid ultrasound to assess vasculature, CTA Head to assess vasculature , CTA Neck/Arch to assess vasculature, HgbA1C to assess blood glucose levels, Lipid Profile to assess cholesterol levels, MRI head without contrast to assess brain parenchyma, TSH to assess thyroid function    VTE prophylaxis: Mechanical prophylaxis: Place SCDs    BP parameters: TIA: SBP <220 until imaging confirmation of no infarct

## 2024-03-07 NOTE — ASSESSMENT & PLAN NOTE
Patient has missed his blood pressure medications at home.     Continue amlodipine 10 mg once a day  Add on losartan 50 mg once a day  Add on hydralazine 50 mg q.8 p.r.n. for systolic more than 170  Try to decrease SBP lower than 25% in 1st 24 hours

## 2024-03-07 NOTE — ASSESSMENT & PLAN NOTE
Very mild, spoke with Neurology in-house, very unlikely the culprit for patient's symptoms.   No need to transfer to main [place].  Continue high-dose Lipitor, continue aspirin Plavix  Outpatient follow-up with vascular surgeon

## 2024-03-07 NOTE — PROGRESS NOTES
Pharmacist Renal Dose Adjustment Note    Evelio Pandya is a 84 y.o. male being treated with the medication pepcid    Patient Data:    Vital Signs (Most Recent):  Temp: 97.6 °F (36.4 °C) (03/06/24 1544)  Pulse: 79 (03/07/24 0100)  Resp: 18 (03/07/24 0100)  BP: (!) 177/88 (03/07/24 0100)  SpO2: 99 % (03/07/24 0100) Vital Signs (72h Range):  Temp:  [97.6 °F (36.4 °C)]   Pulse:  [69-86]   Resp:  [18]   BP: (135-194)/()   SpO2:  [96 %-100 %]      Recent Labs   Lab 03/06/24  1538   CREATININE 1.4     Serum creatinine: 1.4 mg/dL 03/06/24 1538  Estimated creatinine clearance: 43.3 mL/min    Medication:pepcid dose: 20mg frequency bid will be changed to medication:pepcid dose:20mg frequency:qd    Pharmacist's Name: Timoteo Sparrow  Pharmacist's Extension: [phone]

## 2024-03-07 NOTE — ED NOTES
Assumed patient care.   Pt resting on stretcher in NAD, respirations even and unlabored. Stretcher locked and in lowest position, side rails x 2, call bell within reach. Cardiac monitor, pulse ox and BP cuff applied cycling Q 30 minute vitals. Pt offered restroom assistance, pt states no needs at this time, urinal within reach. Will continue to monitor and update pt on plan of care.

## 2024-03-07 NOTE — H&P
[facility] Medicine  History & Physical    Patient Name: Evelio Pandya  MRN: [id number]  Patient Class: IP- Inpatient  Admission Date: 3/6/2024  Attending Physician: Lyle Mayorga MD  Primary Care Provider: [facility]         Patient information was obtained from patient and ER records.     Subjective:     Principal Problem:Hypertensive encephalopathy    Chief Complaint:   Chief Complaint   Patient presents with    Altered Mental Status     Pt comes in today via ems with a c/o slurred speech starting at 130 lasting about 45 min.        HPI:   Evelio Pandya is an 84 year old male with a past medical history of DM, HTN, TIA who presented to the ED with stroke-like symptoms that resolved just prior to arrival. He reported slurred speech as well as word finding difficulties at around 130 pm today, but have since resolved. He states he has had similar episodes several times in the past month. He denied other complaint. A Code stroke was initiated on arrival to the ED, and the ED physician Betsy spoke with neurologist . The patient was found to not be a tPA candidate as he was back to baseline. CT imaging did not show an acute stroke, but did show stenosis of carotid vasculature of 40% on right and 50% on left side. Dr. Navarro recommended transfer to [facility] for further neurovascular evaluation, continue aspirin, Plavix and atorvastatin, obtain an MRI and Bilateral carotid ultrasound.  I spoke with in-house neurologist Dr. Douglass, patient's symptoms is more consistent with hypertensive encephalopathy, patient's blood pressure is more than 200, patient has missed his blood pressure medications, no need to transfer to [facility].  We will admit patient to our hospital for for the management of hypertensive crisis.     Past Medical History:   Diagnosis Date    Diabetes mellitus     Hypertension     TIA (transient ischemic attack)     Urinary retention        Past Surgical  "History:   Procedure Laterality Date    CATARACT EXTRACTION Right     GLAUCOMA SURGERY Right        Review of patient's allergies indicates:  No Known Allergies    No current facility-administered medications on file prior to encounter.     Current Outpatient Medications on File Prior to Encounter   Medication Sig    amLODIPine (NORVASC) 10 MG tablet Take 10 mg by mouth.    aspirin (ECOTRIN) 81 MG EC tablet Take 81 mg by mouth once daily.    atorvastatin (LIPITOR) 80 MG tablet Take 1 tablet (80 mg total) by mouth once daily.    BD ULTRA-FINE SHORT PEN NEEDLE 31 gauge x 5/16" Ndle Insulin injection 4 times a day    brimonidine 0.2% (ALPHAGAN) 0.2 % Drop Place 1 drop into both eyes every 12 (twelve) hours.    clopidogreL (PLAVIX) 75 mg tablet TAKE 1 TABLET(75 MG) BY MOUTH EVERY DAY    dorzolamide-timolol 2-0.5% (COSOPT) 22.3-6.8 mg/mL ophthalmic solution Place 1 drop into both eyes every 12 (twelve) hours.    gabapentin (NEURONTIN) 300 MG capsule Take 300 mg by mouth every evening.    insulin detemir U-100, Levemir, (LEVEMIR FLEXPEN) 100 unit/mL (3 mL) InPn pen Inject 15 Units into the skin every evening.    NOVOLOG FLEXPEN U-100 INSULIN 100 unit/mL (3 mL) InPn pen SMARTSI Unit(s) SUB-Q 3 Times Daily    tamsulosin (FLOMAX) 0.4 mg Cap Take 1 capsule (0.4 mg total) by mouth every evening.    travoprost (TRAVATAN Z) 0.004 % ophthalmic solution Place 1 drop into both eyes every evening.    TRUE METRIX GLUCOSE TEST STRIP Strp 3 (three) times daily. Use to test blood glucose    TRUEPLUS LANCETS 33 gauge Misc      Family History    Family history is unknown by patient.       Tobacco Use    Smoking status: Never    Smokeless tobacco: Never   Substance and Sexual Activity    Alcohol use: Not Currently    Drug use: Not on file    Sexual activity: Not Currently     Review of Systems   Constitutional:  Negative for activity change and fever.   HENT:  Negative for ear discharge, facial swelling and sore throat.    Eyes:  " Negative for photophobia, pain and visual disturbance.   Respiratory:  Negative for apnea, cough and shortness of breath.    Cardiovascular:  Negative for chest pain and leg swelling.   Gastrointestinal:  Negative for abdominal pain and blood in stool.   Endocrine: Negative for cold intolerance and heat intolerance.   Musculoskeletal:  Negative for back pain and gait problem.   Skin:  Negative for pallor and rash.   Neurological:  Positive for speech difficulty. Negative for headaches.        Transient altered mental status   Psychiatric/Behavioral:  Negative for confusion, hallucinations and suicidal ideas.    All other systems reviewed and are negative.    Objective:     Vital Signs (Most Recent):  Temp: 98.3 °F (36.8 °C) (03/07/24 1030)  Pulse: 86 (03/07/24 1141)  Resp: 16 (03/07/24 1141)  BP: (!) 186/77 (03/07/24 1141)  SpO2: 99 % (03/07/24 1141) Vital Signs (24h Range):  Temp:  [97.6 °F (36.4 °C)-98.3 °F (36.8 °C)] 98.3 °F (36.8 °C)  Pulse:  [69-86] 86  Resp:  [16-18] 16  SpO2:  [96 %-100 %] 99 %  BP: (135-194)/() 186/77     Weight: 92.5 kg (204 lb)  Body mass index is 32.93 kg/m².     Physical Exam  Vitals and nursing note reviewed.   Constitutional:       General: He is not in acute distress.     Appearance: He is not diaphoretic.   HENT:      Head: Normocephalic.   Eyes:      General: No scleral icterus.        Right eye: No discharge.         Left eye: No discharge.      Conjunctiva/sclera: Conjunctivae normal.   Neck:      Vascular: No JVD.   Cardiovascular:      Rate and Rhythm: Normal rate and regular rhythm.      Heart sounds: Normal heart sounds. No murmur heard.     No friction rub.   Pulmonary:      Effort: Pulmonary effort is normal. No respiratory distress.      Breath sounds: Normal breath sounds. No wheezing.   Abdominal:      General: Bowel sounds are normal. There is no distension.      Palpations: Abdomen is soft.      Tenderness: There is no abdominal tenderness.   Musculoskeletal:          General: Normal range of motion.   Lymphadenopathy:      Cervical: No cervical adenopathy.   Skin:     Findings: No erythema or rash.   Neurological:      Mental Status: He is alert and oriented to person, place, and time.      Deep Tendon Reflexes: Reflexes are normal and symmetric.   Psychiatric:         Behavior: Behavior normal.                Significant Labs: All pertinent labs within the past 24 hours have been reviewed.  BMP:   Recent Labs   Lab 03/06/24  1538         K 4.0      CO2 23   BUN 18   CREATININE 1.4   CALCIUM 8.9     CBC:   Recent Labs   Lab 03/06/24  1538   WBC 8.85   HGB 12.0*   HCT 35.9*          Significant Imaging: I have reviewed all pertinent imaging results/findings within the past 24 hours.  I have reviewed and interpreted all pertinent imaging results/findings within the past 24 hours.      MRI Brain Without Contrast    Result Date: 3/7/2024  EXAMINATION: MRI BRAIN WITHOUT CONTRAST CLINICAL HISTORY: Stroke, follow up;. TECHNIQUE: Multiplanar multisequence MR imaging of the brain was performed without contrast COMPARISON: CTA 03/06/2024.  MRI (report only) 09/07/2022. FINDINGS: Brain: No acute infarct or intracranial hemorrhage.  No midline shift or mass effect or space-occupying extra-axial fluid collection.  Redemonstrated confluent T2/FLAIR hyperintensity throughout the cerebral hemispheric white matter consistent with moderate to severe chronic small vessel ischemic changes as well as chronic lacunar type infarcts within the bilateral cerebellum and bilateral centrum semiovale.  Moderate global involutional changes. Midline Structures: The midline structures of the brain are normal. Ventricles: The ventricles, sulci and cisterns are within normal limits. Vasculature: The vascular flow voids at the base of the brain are within normal limits. Calvarium: The visualized osseous structures are unremarkable. Sinuses: The paranasal sinuses are adequately  aerated. Orbits: No significant abnormality. Mastoids: Trace left mastoid effusion. Extracranial soft tissues: The visualized extracranial soft tissues are unremarkable.     1. No recent infarct or other acute intracranial process. 2. Moderate to advanced chronic small vessel ischemic changes. Electronically signed by: Jet Max Date:    03/07/2024 Time:    08:47    US Carotid Bilateral    Result Date: 3/7/2024  EXAMINATION: US CAROTID BILATERAL CLINICAL HISTORY: carotid stenosis, stroke work up; TECHNIQUE: Grayscale and color Doppler ultrasound examination of the carotid and vertebral artery systems bilaterally.  Stenosis estimates are per the NASCET measurement criteria. COMPARISON: None FINDINGS: Right: Internal Carotid Artery (ICA) peak systolic velocity 99 cm/sec ICA/CCA peak systolic velocity ratio: 1.1 Plaque formation: Appears moderate Vertebral artery: Antegrade flow and normal waveform. Left: Internal Carotid Artery (ICA)  peak systolic velocity 102 cm/sec ICA/CCA peak systolic velocity ratio: 1.7 Plaque formation: Appears moderate Vertebral artery: Antegrade flow and normal waveform.     No evidence of a hemodynamically significant carotid bifurcation stenosis. Flow in vertebral arteries appears antegrade bilaterally. Electronically signed by: Anny Hopkins MD Date:    03/07/2024 Time:    08:37    CTA Head and Neck (xpd)    Result Date: 3/6/2024  EXAMINATION: CTA HEAD AND NECK (XPD) CLINICAL HISTORY: Neuro deficit, acute, stroke suspected; TECHNIQUE: Non contrast low dose axial images were obtained thought the head.  CT angiogram was performed from the level of the heather to the top of the head following the IV administration of 75 mL of Omnipaque 350.   Sagittal and coronal reconstructions and maximum intensity projection reconstructions were performed. Arterial stenosis percentages are based on NASCET measurement criteria. COMPARISON: Head CT dated 12/21/2021 FINDINGS: Head CT: There is no acute  abnormality or detectable change in the appearance of the brain compared to the prior study.  There is generalized volume loss with a marked burden of diffuse and confluent white matter hypodensity.  The white matter changes are nonspecific but may reflect sequelae of rather severe chronic small vessel disease.  There is no intracranial hemorrhage.  There is no mass.  The gray-white interface appears preserved without acute infarction.  There is no abnormal extra-axial fluid collection.  There is no abnormal enhancement in the brain on the delayed post-contrast images. The nasal septum is mildly bowed to the left.  There is scattered mucosal thickening in the ethmoid air cells with a small mucous retention cyst in the left sphenoid sinus.  The mastoid air cells are clear.  There is calcified plaque in the vessels at the base of the brain.  The calvarium is normal. CTA Neck: Arch and great vessels: There is a left-sided 3 vessel arch with common origin of the brachiocephalic trunk and left common carotid artery.  The aortic arch and the origins of the great vessels are widely patent.  The included subclavian arteries are patent as well. Carotid arteries: Right Carotid artery: There is calcified plaque at the carotid bulb and proximal internal carotid artery but stenosis of the internal carotid artery is approximately 40%.  The internal carotid artery is patent to the skull base. Left Carotid artery: There is mild scattered plaque in the common carotid artery.  There is also plaque at the carotid bulb and proximal internal carotid artery.  Stenosis of the proximal internal carotid artery is approximately 50%.  The vessel is patent to the skull base. Vertebral arteries: The left vertebral artery is slightly dominant.  Both vertebral arteries are patent throughout their course in the neck.  There is no critical stenosis, occlusion, thrombus or obvious dissection. Other: There is no focal infiltrate or mass in the  included upper lungs.  There is respiratory motion.  The airway is patent.  There is no pathologic lymphadenopathy or dominant soft tissue mass in the neck. CTA Head: Anterior circulation: The petrous segments of the internal carotid arteries are patent.  There is calcified plaque in the cavernous segments bilaterally resulting in intermittent nonocclusive stenosis.  The supraclinoid internal carotid arteries are patent.  The carotid terminus is normal bilaterally.  There is branching into the anterior and middle cerebral arteries.  There is no large vessel occlusion or critical stenosis.  There is no large aneurysm.  There is no obvious thrombus or dissection. Posterior circulation: The vertebral and basilar arteries are patent.  The basilar tip is normal.  There is branching into the superior cerebellar and posterior cerebral arteries.  Faint bilateral posterior communicating arteries are present.  There is no large vessel occlusion or critical stenosis.  There is no thrombus, dissection or large aneurysm. Dural sinuses, other: The dural sinuses are patent.     1. There is no acute abnormality or definite change in the appearance of the brain compared to the prior CT.  There is generalized volume loss with a marked burden of nonspecific white matter change.  There is, however, no hemorrhage, mass or obvious acute infarction.  It should be noted that MRI is more sensitive in the detection of subtle or acute nonhemorrhagic ischemic disease. 2. There is plaque at the carotid bulb bilaterally but there is no critical stenosis.  Stenosis of the proximal right internal carotid artery is approximately 40% and on the left approximately 50%. 3. The vertebral arteries are patent. 4. Intracranially, there is no large vessel occlusion or critical stenosis. Electronically signed by: Lewis Kohler MD Date:    03/06/2024 Time:    15:57  - pulls last radiology orders    Assessment/Plan:     * Hypertensive  encephalopathy    MRI reviewed negative for CVA.  Patient's symptoms is more consistent with hypertensive encephalopathy, now mental status back to his baseline.       Carotid stenosis    Very mild, spoke with Neurology in-house, very unlikely the culprit for patient's symptoms.   No need to transfer to main [place].  Continue high-dose Lipitor, continue aspirin Plavix  Outpatient follow-up with vascular surgeon    TIA (transient ischemic attack)  Admit for TIA, carotid stenosis  NIH 0, symptoms have completely resolved  Antithrombotics for secondary stroke prevention: Antiplatelets: Aspirin: 81 mg daily  Clopidogrel: 75 mg daily    Statins for secondary stroke prevention and hyperlipidemia, if present:   Statins: Atorvastatin- 40 mg daily    Aggressive risk factor modification: HTN, DM, Obesity       Diagnostics ordered/pending: Carotid ultrasound to assess vasculature, CTA Head to assess vasculature , CTA Neck/Arch to assess vasculature, HgbA1C to assess blood glucose levels, Lipid Profile to assess cholesterol levels, MRI head without contrast to assess brain parenchyma, TSH to assess thyroid function    VTE prophylaxis: Mechanical prophylaxis: Place SCDs        Morbid (severe) obesity with alveolar hypoventilation  Body mass index is 32.93 kg/m². Morbid obesity complicates all aspects of disease management from diagnostic modalities to treatment. Weight loss encouraged and health benefits explained to patient.         Chronic kidney disease, stage 3 unspecified  Creatine stable for now. BMP reviewed- noted Estimated Creatinine Clearance: 43.3 mL/min (based on SCr of 1.4 mg/dL). according to latest data. Based on current GFR, CKD stage is stage 3 - GFR 30-59.  Monitor UOP and serial BMP and adjust therapy as needed. Renally dose meds. Avoid nephrotoxic medications and procedures.    BPH (benign prostatic hyperplasia)  Noted, chronic  Continue home meds      Hypertensive crisis  Patient has missed his blood  pressure medications at home.     Continue amlodipine 10 mg once a day  Add on losartan 50 mg once a day  Add on hydralazine 50 mg q.8 p.r.n. for systolic more than 170  Try to decrease SBP lower than 25% in 1st 24 hours    Type 2 diabetes mellitus with hyperglycemia, with long-term current use of insulin  Patient's FSGs are controlled on current medication regimen.  Last A1c reviewed-   Lab Results   Component Value Date    HGBA1C 6.2 (H) 02/09/2024     Most recent fingerstick glucose reviewed-   Recent Labs   Lab 03/06/24  1524 03/07/24  0136 03/07/24  1006   POCTGLUCOSE 99 163* 150*       Current correctional scale  Medium  Maintain anti-hyperglycemic dose as follows-   Antihyperglycemics (From admission, onward)      Start     Stop Route Frequency Ordered    03/07/24 0215  insulin detemir U-100 (Levemir) pen 15 Units         -- SubQ Nightly 03/07/24 0110    03/07/24 0201  insulin aspart U-100 pen 0-10 Units         -- SubQ Before meals & nightly PRN 03/07/24 0101          Hold Oral hypoglycemics while patient is in the hospital.          VTE Risk Mitigation (From admission, onward)      None                       Pharmacist Renal Dose Adjustment Note    Evelio Pandya is a 84 y.o. male being treated with the medication pepcid    Patient Data:    Vital Signs (Most Recent):  Temp: 97.6 °F (36.4 °C) (03/06/24 1544)  Pulse: 79 (03/07/24 0100)  Resp: 18 (03/07/24 0100)  BP: (!) 177/88 (03/07/24 0100)  SpO2: 99 % (03/07/24 0100) Vital Signs (72h Range):  Temp:  [97.6 °F (36.4 °C)]   Pulse:  [69-86]   Resp:  [18]   BP: (135-194)/()   SpO2:  [96 %-100 %]      Recent Labs   Lab 03/06/24  1538   CREATININE 1.4     Serum creatinine: 1.4 mg/dL 03/06/24 1538  Estimated creatinine clearance: 43.3 mL/min    Medication:pepcid dose: 20mg frequency bid will be changed to medication:pepcid dose:20mg frequency:qd    Pharmacist's Name: Timoteo Sparrow  Pharmacist's Extension: [phone]      Lyle Mayorga MD  Department of Hospital  Medicine  [facility]

## 2024-03-07 NOTE — ASSESSMENT & PLAN NOTE
Patient's FSGs are controlled on current medication regimen.  Last A1c reviewed-   Lab Results   Component Value Date    HGBA1C 6.2 (H) 02/09/2024     Most recent fingerstick glucose reviewed-   Recent Labs   Lab 03/06/24  1524 03/07/24  0136 03/07/24  1006   POCTGLUCOSE 99 163* 150*       Current correctional scale  Medium  Maintain anti-hyperglycemic dose as follows-   Antihyperglycemics (From admission, onward)    Start     Stop Route Frequency Ordered    03/07/24 0215  insulin detemir U-100 (Levemir) pen 15 Units         -- SubQ Nightly 03/07/24 0110    03/07/24 0201  insulin aspart U-100 pen 0-10 Units         -- SubQ Before meals & nightly PRN 03/07/24 0101        Hold Oral hypoglycemics while patient is in the hospital.

## 2024-03-07 NOTE — ED NOTES
Patient is AAOx4 with a GCS 15. He is talking, laughing and asking for an update. Patient is not complaining of any pain, discomfort, weakness or acute vision changes. No facial droop, slurred speech, weakness, confusion noted during assessment. NAD noted.

## 2024-03-07 NOTE — ASSESSMENT & PLAN NOTE
Chronic, controlled. Latest blood pressure and vitals reviewed-     Temp:  [97.6 °F (36.4 °C)]   Pulse:  [69-86]   Resp:  [18]   BP: (135-194)/()   SpO2:  [96 %-100 %] .   Home meds for hypertension were reviewed and noted below.   Hypertension Medications               amLODIPine (NORVASC) 10 MG tablet Take 10 mg by mouth.            While in the hospital, will manage blood pressure as follows; Adjust home antihypertensive regimen as follows- permissive HTN    Will utilize p.r.n. blood pressure medication only if patient's blood pressure greater than 220/110 and he develops symptoms such as worsening chest pain or shortness of breath.

## 2024-03-07 NOTE — HOSPITAL COURSE
Patient admitted to hospitalist for further management, further inquiry with family shows the patient symptoms more likely transient confusion instead of slow speech, patient has no focal deficits, patient did miss his blood pressure medication since he has been visiting his family members.  Patient's blood pressure personally more than 200 during my evaluation.  Patient's symptomology more likely consistent with hypertensive encephalopathy, MRI of the brain is negative for acute CVA.  We will gradually add on losartan and hydralazine, blood pressure controlled.  Patient cleared for discharge home.  Patient already on aspirin Plavix and Lipitor at home.

## 2024-03-07 NOTE — PLAN OF CARE
[facility] - Med/Surg  Initial Discharge Assessment       Primary Care Provider: Yamil Gutierrez DO    Admission Diagnosis: Stenosis of left carotid artery [I65.22]    Admission Date: 3/6/2024  Expected Discharge Date:     Transition of Care Barriers: None    Payor: HU[employer] MANAGED MEDICARE / Plan: HU[employer]NA MEDICARE HMO / Product Type: Capitation /     Extended Emergency Contact Information  Primary Emergency Contact: JAYLEN CARLOS  Mobile Phone: [phone]  Relation: Daughter  Preferred language: English   needed? No    Discharge Plan A: Home, Home with family  Discharge Plan B: Home Health      [id number] - [place], [place] - [address] AT [facility] [address]  [address]  [place] [address]  Phone: [phone] Fax: [phone]    [id number] - [place], [place] - [address] [facility] [address] & [facility] [address]  [address]  [place] [address]  Phone: [phone] Fax: [phone]    SW met with patient at bedside to complete discharge planning assessment.  Patient alert and oriented xs 4.  Patient verified all demographic information on facesheet is correct.  Patient verified PCP is Dr Gutierrez but see NP Harinder Still.  Patient verified primary health insurance is [employer].  Patient with NO home health or DME.  Patient with NO POA or Living Will.  Patient not on dialysis or medication coumadin.  Patient with no 30 day admission.  Patient with no financial issues at this time.  Patient family will provide transportation upon discharge from facility.  Patient independent with ADLs, live with daughter and son in law, daughter son in law drives.      Initial Assessment (most recent)       Adult Discharge Assessment - 03/07/24 1722          Discharge Assessment    Assessment Type Discharge Planning Assessment     Confirmed/corrected address, phone number and insurance Yes     Confirmed Demographics Correct on Facesheet      Source of Information patient     Communicated GIFTY with patient/caregiver Date not available/Unable to determine     People in Home child(george), adult     Facility Arrived From: home     Do you expect to return to your current living situation? Yes     Do you have help at home or someone to help you manage your care at home? Yes     Who are your caregiver(s) and their phone number(s)? family     Prior to hospitilization cognitive status: Alert/Oriented     Current cognitive status: Alert/Oriented     Walking or Climbing Stairs Difficulty no     Dressing/Bathing Difficulty no     Equipment Currently Used at Home none     Readmission within 30 days? No     Patient currently being followed by outpatient case management? No     Do you currently have service(s) that help you manage your care at home? No     Do you take prescription medications? Yes     Do you have prescription coverage? Yes     Do you have any problems affording any of your prescribed medications? No     Is the patient taking medications as prescribed? yes     Who is going to help you get home at discharge? family     How do you get to doctors appointments? family or friend will provide     Are you on dialysis? No     Do you take coumadin? No     Discharge Plan A Home;Home with family     Discharge Plan B Home Health     DME Needed Upon Discharge  none     Discharge Plan discussed with: Patient     Transition of Care Barriers None        Physical Activity    On average, how many days per week do you engage in moderate to strenuous exercise (like a brisk walk)? 3 days     On average, how many minutes do you engage in exercise at this level? 60 min        Financial Resource Strain    How hard is it for you to pay for the very basics like food, housing, medical care, and heating? Not hard at all        Housing Stability    In the last 12 months, was there a time when you were not able to pay the mortgage or rent on time? No     In the last 12 months, was  there a time when you did not have a steady place to sleep or slept in a shelter (including now)? No        Transportation Needs    In the past 12 months, has lack of transportation kept you from medical appointments or from getting medications? No     In the past 12 months, has lack of transportation kept you from meetings, work, or from getting things needed for daily living? No        Food Insecurity    Within the past 12 months, you worried that your food would run out before you got the money to buy more. Never true     Within the past 12 months, the food you bought just didn't last and you didn't have money to get more. Never true        Stress    Do you feel stress - tense, restless, nervous, or anxious, or unable to sleep at night because your mind is troubled all the time - these days? Not at all        Social Connections    In a typical week, how many times do you talk on the phone with family, friends, or neighbors? More than three times a week     How often do you get together with friends or relatives? More than three times a week     How often do you attend Anglican or Gnosticism services? More than 4 times per year     Do you belong to any clubs or organizations such as Anglican groups, unions, fraternal or athletic groups, or school groups? Yes     How often do you attend meetings of the clubs or organizations you belong to? Never     Are you , , , , never , or living with a partner?         Alcohol Use    Q1: How often do you have a drink containing alcohol? 2-4 times a month     Q2: How many drinks containing alcohol do you have on a typical day when you are drinking? 1 or 2     Q3: How often do you have six or more drinks on one occasion? Never

## 2024-03-07 NOTE — ASSESSMENT & PLAN NOTE
Creatine stable for now. BMP reviewed- noted Estimated Creatinine Clearance: 43.3 mL/min (based on SCr of 1.4 mg/dL). according to latest data. Based on current GFR, CKD stage is stage 3 - GFR 30-59.  Monitor UOP and serial BMP and adjust therapy as needed. Renally dose meds. Avoid nephrotoxic medications and procedures.

## 2024-03-07 NOTE — ASSESSMENT & PLAN NOTE
Admit for TIA, carotid stenosis  NIH 0, symptoms have completely resolved  Antithrombotics for secondary stroke prevention: Antiplatelets: Aspirin: 81 mg daily  Clopidogrel: 75 mg daily    Statins for secondary stroke prevention and hyperlipidemia, if present:   Statins: Atorvastatin- 40 mg daily    Aggressive risk factor modification: HTN, DM, Obesity       Diagnostics ordered/pending: Carotid ultrasound to assess vasculature, CTA Head to assess vasculature , CTA Neck/Arch to assess vasculature, HgbA1C to assess blood glucose levels, Lipid Profile to assess cholesterol levels, MRI head without contrast to assess brain parenchyma, TSH to assess thyroid function    VTE prophylaxis: Mechanical prophylaxis: Place SCDs

## 2024-03-07 NOTE — NURSING
Nurses Note -- 4 Eyes      3/7/2024   3:58 PM      Skin assessed during: Transfer      [x] No Altered Skin Integrity Present    []Prevention Measures Documented      [] Yes- Altered Skin Integrity Present or Discovered   [] LDA Added if Not in Epic (Describe Wound)   [] New Altered Skin Integrity was Present on Admit and Documented in LDA   [] Wound Image Taken    Wound Care Consulted? No    Attending Nurse:  Kriss Jewell RN/Staff Member:   gonzalez

## 2024-03-07 NOTE — SUBJECTIVE & OBJECTIVE
"Past Medical History:   Diagnosis Date    Diabetes mellitus     Hypertension     TIA (transient ischemic attack)     Urinary retention        Past Surgical History:   Procedure Laterality Date    CATARACT EXTRACTION Right     GLAUCOMA SURGERY Right        Review of patient's allergies indicates:  No Known Allergies    No current facility-administered medications on file prior to encounter.     Current Outpatient Medications on File Prior to Encounter   Medication Sig    amLODIPine (NORVASC) 10 MG tablet Take 10 mg by mouth.    aspirin (ECOTRIN) 81 MG EC tablet Take 81 mg by mouth once daily.    atorvastatin (LIPITOR) 80 MG tablet Take 1 tablet (80 mg total) by mouth once daily.    BD ULTRA-FINE SHORT PEN NEEDLE 31 gauge x 5/16" Ndle Insulin injection 4 times a day    brimonidine 0.2% (ALPHAGAN) 0.2 % Drop Place 1 drop into both eyes every 12 (twelve) hours.    clopidogreL (PLAVIX) 75 mg tablet TAKE 1 TABLET(75 MG) BY MOUTH EVERY DAY    dorzolamide-timolol 2-0.5% (COSOPT) 22.3-6.8 mg/mL ophthalmic solution Place 1 drop into both eyes every 12 (twelve) hours.    gabapentin (NEURONTIN) 300 MG capsule Take 300 mg by mouth every evening.    insulin detemir U-100, Levemir, (LEVEMIR FLEXPEN) 100 unit/mL (3 mL) InPn pen Inject 15 Units into the skin every evening.    NOVOLOG FLEXPEN U-100 INSULIN 100 unit/mL (3 mL) InPn pen SMARTSI Unit(s) SUB-Q 3 Times Daily    tamsulosin (FLOMAX) 0.4 mg Cap Take 1 capsule (0.4 mg total) by mouth every evening.    travoprost (TRAVATAN Z) 0.004 % ophthalmic solution Place 1 drop into both eyes every evening.    TRUE METRIX GLUCOSE TEST STRIP Strp 3 (three) times daily. Use to test blood glucose    TRUEPLUS LANCETS 33 gauge Misc      Family History    Family history is unknown by patient.       Tobacco Use    Smoking status: Never    Smokeless tobacco: Never   Substance and Sexual Activity    Alcohol use: Not Currently    Drug use: Not on file    Sexual activity: Not Currently     Review " of Systems   Constitutional:  Negative for activity change and fever.   HENT:  Negative for ear discharge, facial swelling and sore throat.    Eyes:  Negative for photophobia, pain and visual disturbance.   Respiratory:  Negative for apnea, cough and shortness of breath.    Cardiovascular:  Negative for chest pain and leg swelling.   Gastrointestinal:  Negative for abdominal pain and blood in stool.   Endocrine: Negative for cold intolerance and heat intolerance.   Musculoskeletal:  Negative for back pain and gait problem.   Skin:  Negative for pallor and rash.   Neurological:  Positive for speech difficulty. Negative for headaches.        Transient altered mental status   Psychiatric/Behavioral:  Negative for confusion, hallucinations and suicidal ideas.    All other systems reviewed and are negative.    Objective:     Vital Signs (Most Recent):  Temp: 98.3 °F (36.8 °C) (03/07/24 1030)  Pulse: 86 (03/07/24 1141)  Resp: 16 (03/07/24 1141)  BP: (!) 186/77 (03/07/24 1141)  SpO2: 99 % (03/07/24 1141) Vital Signs (24h Range):  Temp:  [97.6 °F (36.4 °C)-98.3 °F (36.8 °C)] 98.3 °F (36.8 °C)  Pulse:  [69-86] 86  Resp:  [16-18] 16  SpO2:  [96 %-100 %] 99 %  BP: (135-194)/() 186/77     Weight: 92.5 kg (204 lb)  Body mass index is 32.93 kg/m².     Physical Exam  Vitals and nursing note reviewed.   Constitutional:       General: He is not in acute distress.     Appearance: He is not diaphoretic.   HENT:      Head: Normocephalic.   Eyes:      General: No scleral icterus.        Right eye: No discharge.         Left eye: No discharge.      Conjunctiva/sclera: Conjunctivae normal.   Neck:      Vascular: No JVD.   Cardiovascular:      Rate and Rhythm: Normal rate and regular rhythm.      Heart sounds: Normal heart sounds. No murmur heard.     No friction rub.   Pulmonary:      Effort: Pulmonary effort is normal. No respiratory distress.      Breath sounds: Normal breath sounds. No wheezing.   Abdominal:      General: Bowel  sounds are normal. There is no distension.      Palpations: Abdomen is soft.      Tenderness: There is no abdominal tenderness.   Musculoskeletal:         General: Normal range of motion.   Lymphadenopathy:      Cervical: No cervical adenopathy.   Skin:     Findings: No erythema or rash.   Neurological:      Mental Status: He is alert and oriented to person, place, and time.      Deep Tendon Reflexes: Reflexes are normal and symmetric.   Psychiatric:         Behavior: Behavior normal.                Significant Labs: All pertinent labs within the past 24 hours have been reviewed.  BMP:   Recent Labs   Lab 03/06/24  1538         K 4.0      CO2 23   BUN 18   CREATININE 1.4   CALCIUM 8.9     CBC:   Recent Labs   Lab 03/06/24  1538   WBC 8.85   HGB 12.0*   HCT 35.9*          Significant Imaging: I have reviewed all pertinent imaging results/findings within the past 24 hours.  I have reviewed and interpreted all pertinent imaging results/findings within the past 24 hours.      MRI Brain Without Contrast    Result Date: 3/7/2024  EXAMINATION: MRI BRAIN WITHOUT CONTRAST CLINICAL HISTORY: Stroke, follow up;. TECHNIQUE: Multiplanar multisequence MR imaging of the brain was performed without contrast COMPARISON: CTA 03/06/2024.  MRI (report only) 09/07/2022. FINDINGS: Brain: No acute infarct or intracranial hemorrhage.  No midline shift or mass effect or space-occupying extra-axial fluid collection.  Redemonstrated confluent T2/FLAIR hyperintensity throughout the cerebral hemispheric white matter consistent with moderate to severe chronic small vessel ischemic changes as well as chronic lacunar type infarcts within the bilateral cerebellum and bilateral centrum semiovale.  Moderate global involutional changes. Midline Structures: The midline structures of the brain are normal. Ventricles: The ventricles, sulci and cisterns are within normal limits. Vasculature: The vascular flow voids at the base of  the brain are within normal limits. Calvarium: The visualized osseous structures are unremarkable. Sinuses: The paranasal sinuses are adequately aerated. Orbits: No significant abnormality. Mastoids: Trace left mastoid effusion. Extracranial soft tissues: The visualized extracranial soft tissues are unremarkable.     1. No recent infarct or other acute intracranial process. 2. Moderate to advanced chronic small vessel ischemic changes. Electronically signed by: Jet Max Date:    03/07/2024 Time:    08:47    US Carotid Bilateral    Result Date: 3/7/2024  EXAMINATION: US CAROTID BILATERAL CLINICAL HISTORY: carotid stenosis, stroke work up; TECHNIQUE: Grayscale and color Doppler ultrasound examination of the carotid and vertebral artery systems bilaterally.  Stenosis estimates are per the NASCET measurement criteria. COMPARISON: None FINDINGS: Right: Internal Carotid Artery (ICA) peak systolic velocity 99 cm/sec ICA/CCA peak systolic velocity ratio: 1.1 Plaque formation: Appears moderate Vertebral artery: Antegrade flow and normal waveform. Left: Internal Carotid Artery (ICA)  peak systolic velocity 102 cm/sec ICA/CCA peak systolic velocity ratio: 1.7 Plaque formation: Appears moderate Vertebral artery: Antegrade flow and normal waveform.     No evidence of a hemodynamically significant carotid bifurcation stenosis. Flow in vertebral arteries appears antegrade bilaterally. Electronically signed by: Anny Hopkins MD Date:    03/07/2024 Time:    08:37    CTA Head and Neck (xpd)    Result Date: 3/6/2024  EXAMINATION: CTA HEAD AND NECK (XPD) CLINICAL HISTORY: Neuro deficit, acute, stroke suspected; TECHNIQUE: Non contrast low dose axial images were obtained thought the head.  CT angiogram was performed from the level of the heather to the top of the head following the IV administration of 75 mL of Omnipaque 350.   Sagittal and coronal reconstructions and maximum intensity projection reconstructions were performed.  Arterial stenosis percentages are based on NASCET measurement criteria. COMPARISON: Head CT dated 12/21/2021 FINDINGS: Head CT: There is no acute abnormality or detectable change in the appearance of the brain compared to the prior study.  There is generalized volume loss with a marked burden of diffuse and confluent white matter hypodensity.  The white matter changes are nonspecific but may reflect sequelae of rather severe chronic small vessel disease.  There is no intracranial hemorrhage.  There is no mass.  The gray-white interface appears preserved without acute infarction.  There is no abnormal extra-axial fluid collection.  There is no abnormal enhancement in the brain on the delayed post-contrast images. The nasal septum is mildly bowed to the left.  There is scattered mucosal thickening in the ethmoid air cells with a small mucous retention cyst in the left sphenoid sinus.  The mastoid air cells are clear.  There is calcified plaque in the vessels at the base of the brain.  The calvarium is normal. CTA Neck: Arch and great vessels: There is a left-sided 3 vessel arch with common origin of the brachiocephalic trunk and left common carotid artery.  The aortic arch and the origins of the great vessels are widely patent.  The included subclavian arteries are patent as well. Carotid arteries: Right Carotid artery: There is calcified plaque at the carotid bulb and proximal internal carotid artery but stenosis of the internal carotid artery is approximately 40%.  The internal carotid artery is patent to the skull base. Left Carotid artery: There is mild scattered plaque in the common carotid artery.  There is also plaque at the carotid bulb and proximal internal carotid artery.  Stenosis of the proximal internal carotid artery is approximately 50%.  The vessel is patent to the skull base. Vertebral arteries: The left vertebral artery is slightly dominant.  Both vertebral arteries are patent throughout their course  in the neck.  There is no critical stenosis, occlusion, thrombus or obvious dissection. Other: There is no focal infiltrate or mass in the included upper lungs.  There is respiratory motion.  The airway is patent.  There is no pathologic lymphadenopathy or dominant soft tissue mass in the neck. CTA Head: Anterior circulation: The petrous segments of the internal carotid arteries are patent.  There is calcified plaque in the cavernous segments bilaterally resulting in intermittent nonocclusive stenosis.  The supraclinoid internal carotid arteries are patent.  The carotid terminus is normal bilaterally.  There is branching into the anterior and middle cerebral arteries.  There is no large vessel occlusion or critical stenosis.  There is no large aneurysm.  There is no obvious thrombus or dissection. Posterior circulation: The vertebral and basilar arteries are patent.  The basilar tip is normal.  There is branching into the superior cerebellar and posterior cerebral arteries.  Faint bilateral posterior communicating arteries are present.  There is no large vessel occlusion or critical stenosis.  There is no thrombus, dissection or large aneurysm. Dural sinuses, other: The dural sinuses are patent.     1. There is no acute abnormality or definite change in the appearance of the brain compared to the prior CT.  There is generalized volume loss with a marked burden of nonspecific white matter change.  There is, however, no hemorrhage, mass or obvious acute infarction.  It should be noted that MRI is more sensitive in the detection of subtle or acute nonhemorrhagic ischemic disease. 2. There is plaque at the carotid bulb bilaterally but there is no critical stenosis.  Stenosis of the proximal right internal carotid artery is approximately 40% and on the left approximately 50%. 3. The vertebral arteries are patent. 4. Intracranially, there is no large vessel occlusion or critical stenosis. Electronically signed by: Lewis  MD Jose F Date:    03/06/2024 Time:    15:57  - pulls last radiology orders

## 2024-03-07 NOTE — ASSESSMENT & PLAN NOTE
Body mass index is 32.93 kg/m². Morbid obesity complicates all aspects of disease management from diagnostic modalities to treatment. Weight loss encouraged and health benefits explained to patient.

## 2024-03-07 NOTE — ED NOTES
Report given to BAYLEE Tan. Telebox #[id number] applied to pt for admission. Monitor tech notified/aware. Pt to be transferred to room by this RN to do bedside NIHSS scale with BAYLEE Tan.

## 2024-03-07 NOTE — HPI
Evelio Pandya is an 84 year old male with a past medical history of DM, HTN, TIA who presented to the ED with stroke-like symptoms that resolved just prior to arrival. He reported slurred speech as well as word finding difficulties at around 130 pm today, but have since resolved. He states he has had similar episodes several times in the past month. He denied other complaint. A Code stroke was initiated on arrival to the ED, and the ED physician Betsy spoke with neurologist . The patient was found to not be a tPA candidate as he was back to baseline. CT imaging did not show an acute stroke, but did show stenosis of carotid vasculature of 40% on right and 50% on left side. Dr. Navarro recommended transfer to [facility] for further neurovascular evaluation, continue aspirin, Plavix and atorvastatin, obtain an MRI and Bilateral carotid ultrasound.  I spoke with in-house neurologist Dr. Douglass, patient's symptoms is more consistent with hypertensive encephalopathy, patient's blood pressure is more than 200, patient has missed his blood pressure medications, no need to transfer to [facility].  We will admit patient to our hospital for for the management of hypertensive crisis.

## 2024-03-07 NOTE — CONSULTS
[facility] Medicine  Consult Note    Patient Name: Evelio Pandya  MRN: [id number]  Admission Date: 3/6/2024  Hospital Length of Stay: 0 days  Attending Physician: Heath Blanchard III, MD   Primary Care Provider: Arnoldo Still NP           Patient information was obtained from patient, past medical records, and ER records.     Consults  Subjective:     Principal Problem: Carotid stenosis    Chief Complaint:   Chief Complaint   Patient presents with    Altered Mental Status     Pt comes in today via ems with a c/o slurred speech starting at 130 lasting about 45 min.        HPI:   Evelio Pandya is an 84 year old male with a past medical history of DM, HTN, TIA who presented to the ED with stroke-like symptoms that resolved just prior to arrival. He reported slurred speech as well as word finding difficulties at around 130 pm today, but have since resolved. He states he has had similar episodes several times in the past month. He denied other complaint. A Code stroke was initiated on arrival to the ED, and the ED physician Betsy spoke with neurologist . The patient was found to not be a tPA candidate as he was back to baseline. CT imaging did not show an acute stroke, but did show stenosis of carotid vasculature of 40% on right and 50% on left side. Dr. Navarro recommended transfer to [facility] for further neurovascular evaluation, continue aspirin, Plavix and atorvastatin, obtain an MRI and Bilateral carotid ultrasound. Transfer center was contacted to arrange transfer, but no beds available within the next 24 hours. Patient will hold in ED for now, hospital medicine consulted.    Past Medical History:   Diagnosis Date    Diabetes mellitus     Hypertension     TIA (transient ischemic attack)     Urinary retention        Past Surgical History:   Procedure Laterality Date    CATARACT EXTRACTION Right     GLAUCOMA SURGERY Right        Review of patient's allergies  "indicates:  No Known Allergies    No current facility-administered medications on file prior to encounter.     Current Outpatient Medications on File Prior to Encounter   Medication Sig    amLODIPine (NORVASC) 10 MG tablet Take 10 mg by mouth.    aspirin (ECOTRIN) 81 MG EC tablet Take 81 mg by mouth once daily.    atorvastatin (LIPITOR) 80 MG tablet Take 1 tablet (80 mg total) by mouth once daily.    BD ULTRA-FINE SHORT PEN NEEDLE 31 gauge x 5/16" Ndle Insulin injection 4 times a day    brimonidine 0.2% (ALPHAGAN) 0.2 % Drop Place 1 drop into both eyes every 12 (twelve) hours.    clopidogreL (PLAVIX) 75 mg tablet TAKE 1 TABLET(75 MG) BY MOUTH EVERY DAY    dorzolamide-timolol 2-0.5% (COSOPT) 22.3-6.8 mg/mL ophthalmic solution Place 1 drop into both eyes every 12 (twelve) hours.    gabapentin (NEURONTIN) 300 MG capsule Take 300 mg by mouth every evening.    insulin detemir U-100, Levemir, (LEVEMIR FLEXPEN) 100 unit/mL (3 mL) InPn pen Inject 15 Units into the skin every evening.    NOVOLOG FLEXPEN U-100 INSULIN 100 unit/mL (3 mL) InPn pen SMARTSI Unit(s) SUB-Q 3 Times Daily    tamsulosin (FLOMAX) 0.4 mg Cap Take 1 capsule (0.4 mg total) by mouth every evening.    travoprost (TRAVATAN Z) 0.004 % ophthalmic solution Place 1 drop into both eyes every evening.    TRUE METRIX GLUCOSE TEST STRIP Strp 3 (three) times daily. Use to test blood glucose    TRUEPLUS LANCETS 33 gauge Misc      Family History    Family history is unknown by patient.       Tobacco Use    Smoking status: Never    Smokeless tobacco: Never   Substance and Sexual Activity    Alcohol use: Not Currently    Drug use: Not on file    Sexual activity: Not Currently     Review of Systems   Constitutional:  Negative for chills and fever.   HENT:  Negative for congestion and sore throat.    Eyes:  Negative for visual disturbance.   Respiratory:  Negative for cough and shortness of breath.    Cardiovascular:  Negative for chest pain and palpitations. "   Gastrointestinal:  Negative for abdominal pain, constipation, diarrhea, nausea and vomiting.   Endocrine: Negative for cold intolerance and heat intolerance.   Genitourinary:  Negative for dysuria and hematuria.   Musculoskeletal:  Negative for arthralgias and myalgias.   Skin:  Negative for rash.   Neurological:  Positive for speech difficulty. Negative for tremors and seizures.   Hematological:  Negative for adenopathy. Does not bruise/bleed easily.   All other systems reviewed and are negative.    Objective:     Vital Signs (Most Recent):  Temp: 97.6 °F (36.4 °C) (03/06/24 1544)  Pulse: 69 (03/06/24 2009)  Resp: 18 (03/06/24 2009)  BP: (!) 170/78 (03/06/24 2000)  SpO2: 98 % (03/06/24 2009) Vital Signs (24h Range):  Temp:  [97.6 °F (36.4 °C)] 97.6 °F (36.4 °C)  Pulse:  [69-85] 69  Resp:  [18] 18  SpO2:  [96 %-100 %] 98 %  BP: (135-194)/() 170/78     Weight: 92.5 kg (204 lb)  Body mass index is 32.93 kg/m².     Physical Exam  Vitals and nursing note reviewed.   Constitutional:       General: He is awake.      Appearance: Normal appearance. He is well-developed.   HENT:      Head: Normocephalic and atraumatic.      Nose: Nose normal. No septal deviation.   Eyes:      Conjunctiva/sclera: Conjunctivae normal.      Pupils: Pupils are equal, round, and reactive to light.   Neck:      Thyroid: No thyroid mass.      Vascular: No JVD.      Trachea: No tracheal tenderness or tracheal deviation.   Cardiovascular:      Rate and Rhythm: Normal rate and regular rhythm.      Heart sounds: S1 normal and S2 normal. No murmur heard.     No friction rub. No gallop.   Pulmonary:      Effort: Pulmonary effort is normal.      Breath sounds: Normal breath sounds. No decreased breath sounds, wheezing, rhonchi or rales.   Abdominal:      General: Bowel sounds are normal. There is no distension.      Palpations: Abdomen is soft. There is no hepatomegaly, splenomegaly or mass.      Tenderness: There is no abdominal tenderness.    Skin:     General: Skin is warm.      Findings: No rash.   Neurological:      General: No focal deficit present.      Mental Status: He is alert and oriented to person, place, and time.      Cranial Nerves: No cranial nerve deficit.      Sensory: No sensory deficit.   Psychiatric:         Mood and Affect: Mood normal.         Behavior: Behavior normal. Behavior is cooperative.          Significant Labs: All pertinent labs within the past 24 hours have been reviewed.  A1C:   Recent Labs   Lab 10/11/23  1007 02/09/24  0930   HGBA1C 7.9* 6.2*     CBC:   Recent Labs   Lab 03/06/24  1538   WBC 8.85   HGB 12.0*   HCT 35.9*        CMP:   Recent Labs   Lab 03/06/24  1538      K 4.0      CO2 23      BUN 18   CREATININE 1.4   CALCIUM 8.9   PROT 6.9   ALBUMIN 3.6   BILITOT 0.9   ALKPHOS 56   AST 13   ALT 14   ANIONGAP 7*       Coagulation:   Recent Labs   Lab 03/06/24  1538   INR 1.1       Lipid Panel:   Recent Labs   Lab 03/06/24  1538   CHOL 112*   HDL 39*   LDLCALC 55.4*   TRIG 88   CHOLHDL 34.8       POCT Glucose:   Recent Labs   Lab 03/06/24  1524   POCTGLUCOSE 99       TSH:   Recent Labs   Lab 03/06/24  1538   TSH 1.360     Significant Imaging: I have reviewed all pertinent imaging results/findings within the past 24 hours.  Imaging Results              CTA Head and Neck (xpd) (Final result)  Result time 03/06/24 15:57:20      Final result by Lewis Kohler MD (03/06/24 15:57:20)                   Impression:      1. There is no acute abnormality or definite change in the appearance of the brain compared to the prior CT.  There is generalized volume loss with a marked burden of nonspecific white matter change.  There is, however, no hemorrhage, mass or obvious acute infarction.  It should be noted that MRI is more sensitive in the detection of subtle or acute nonhemorrhagic ischemic disease.  2. There is plaque at the carotid bulb bilaterally but there is no critical stenosis.  Stenosis  of the proximal right internal carotid artery is approximately 40% and on the left approximately 50%.  3. The vertebral arteries are patent.  4. Intracranially, there is no large vessel occlusion or critical stenosis.      Electronically signed by: Lewis Kohler MD  Date:    03/06/2024  Time:    15:57               Narrative:    EXAMINATION:  CTA HEAD AND NECK (XPD)    CLINICAL HISTORY:  Neuro deficit, acute, stroke suspected;    TECHNIQUE:  Non contrast low dose axial images were obtained thought the head.  CT angiogram was performed from the level of the heather to the top of the head following the IV administration of 75 mL of Omnipaque 350.   Sagittal and coronal reconstructions and maximum intensity projection reconstructions were performed. Arterial stenosis percentages are based on NASCET measurement criteria.    COMPARISON:  Head CT dated 12/21/2021    FINDINGS:  Head CT:    There is no acute abnormality or detectable change in the appearance of the brain compared to the prior study.  There is generalized volume loss with a marked burden of diffuse and confluent white matter hypodensity.  The white matter changes are nonspecific but may reflect sequelae of rather severe chronic small vessel disease.  There is no intracranial hemorrhage.  There is no mass.  The gray-white interface appears preserved without acute infarction.  There is no abnormal extra-axial fluid collection.  There is no abnormal enhancement in the brain on the delayed post-contrast images.    The nasal septum is mildly bowed to the left.  There is scattered mucosal thickening in the ethmoid air cells with a small mucous retention cyst in the left sphenoid sinus.  The mastoid air cells are clear.  There is calcified plaque in the vessels at the base of the brain.  The calvarium is normal.    CTA Neck:    Arch and great vessels:    There is a left-sided 3 vessel arch with common origin of the brachiocephalic trunk and left common carotid  artery.  The aortic arch and the origins of the great vessels are widely patent.  The included subclavian arteries are patent as well.    Carotid arteries:    Right Carotid artery: There is calcified plaque at the carotid bulb and proximal internal carotid artery but stenosis of the internal carotid artery is approximately 40%.  The internal carotid artery is patent to the skull base.    Left Carotid artery: There is mild scattered plaque in the common carotid artery.  There is also plaque at the carotid bulb and proximal internal carotid artery.  Stenosis of the proximal internal carotid artery is approximately 50%.  The vessel is patent to the skull base.    Vertebral arteries:    The left vertebral artery is slightly dominant.  Both vertebral arteries are patent throughout their course in the neck.  There is no critical stenosis, occlusion, thrombus or obvious dissection.    Other:    There is no focal infiltrate or mass in the included upper lungs.  There is respiratory motion.  The airway is patent.  There is no pathologic lymphadenopathy or dominant soft tissue mass in the neck.    CTA Head:    Anterior circulation:    The petrous segments of the internal carotid arteries are patent.  There is calcified plaque in the cavernous segments bilaterally resulting in intermittent nonocclusive stenosis.  The supraclinoid internal carotid arteries are patent.  The carotid terminus is normal bilaterally.  There is branching into the anterior and middle cerebral arteries.  There is no large vessel occlusion or critical stenosis.  There is no large aneurysm.  There is no obvious thrombus or dissection.    Posterior circulation:    The vertebral and basilar arteries are patent.  The basilar tip is normal.  There is branching into the superior cerebellar and posterior cerebral arteries.  Faint bilateral posterior communicating arteries are present.  There is no large vessel occlusion or critical stenosis.  There is no  thrombus, dissection or large aneurysm.    Dural sinuses, other:    The dural sinuses are patent.                                      Assessment/Plan:     * Carotid stenosis  Plan to transfer to [facility] for vascular consultation    Assessment and plan:  # Episode of transient neurologic symptoms. Concern for symptomatic carotid stenosis.   - Continue aspirin and clopidogrel daily.   - Continue atorvastatin 80mg daily.   - Recommend MRI brain without contrast to evaluate for acute infarction.   - Also recommend bilateral CUS and vascular surgery consultation.     Symptoms have completely resolved       TIA (transient ischemic attack)  Admit for TIA, carotid stenosis  NIH 0, symptoms have completely resolved  Antithrombotics for secondary stroke prevention: Antiplatelets: Aspirin: 81 mg daily  Clopidogrel: 75 mg daily    Statins for secondary stroke prevention and hyperlipidemia, if present:   Statins: Atorvastatin- 40 mg daily    Aggressive risk factor modification: HTN, DM, Obesity       Diagnostics ordered/pending: Carotid ultrasound to assess vasculature, CTA Head to assess vasculature , CTA Neck/Arch to assess vasculature, HgbA1C to assess blood glucose levels, Lipid Profile to assess cholesterol levels, MRI head without contrast to assess brain parenchyma, TSH to assess thyroid function    VTE prophylaxis: Mechanical prophylaxis: Place SCDs    BP parameters: TIA: SBP <220 until imaging confirmation of no infarct         Morbid (severe) obesity with alveolar hypoventilation  Body mass index is 32.93 kg/m². Morbid obesity complicates all aspects of disease management from diagnostic modalities to treatment. Weight loss encouraged and health benefits explained to patient.         Chronic kidney disease, stage 3 unspecified  Creatine stable for now. BMP reviewed- noted Estimated Creatinine Clearance: 43.3 mL/min (based on SCr of 1.4 mg/dL). according to latest data. Based on current GFR, CKD stage is stage 3 -  GFR 30-59.  Monitor UOP and serial BMP and adjust therapy as needed. Renally dose meds. Avoid nephrotoxic medications and procedures.    BPH (benign prostatic hyperplasia)  Noted, chronic  Continue home meds      HTN (hypertension)  Chronic, controlled. Latest blood pressure and vitals reviewed-     Temp:  [97.6 °F (36.4 °C)]   Pulse:  [69-86]   Resp:  [18]   BP: (135-194)/()   SpO2:  [96 %-100 %] .   Home meds for hypertension were reviewed and noted below.   Hypertension Medications               amLODIPine (NORVASC) 10 MG tablet Take 10 mg by mouth.            While in the hospital, will manage blood pressure as follows; Adjust home antihypertensive regimen as follows- permissive HTN    Will utilize p.r.n. blood pressure medication only if patient's blood pressure greater than 220/110 and he develops symptoms such as worsening chest pain or shortness of breath.    Type 2 diabetes mellitus with hyperglycemia, with long-term current use of insulin  Patient's FSGs are controlled on current medication regimen.  Last A1c reviewed-   Lab Results   Component Value Date    HGBA1C 6.2 (H) 02/09/2024     Most recent fingerstick glucose reviewed-   Recent Labs   Lab 03/06/24  1524   POCTGLUCOSE 99     Current correctional scale  Medium  Maintain anti-hyperglycemic dose as follows-   Antihyperglycemics (From admission, onward)      Start     Stop Route Frequency Ordered    03/07/24 0201  insulin aspart U-100 pen 0-10 Units         -- SubQ Before meals & nightly PRN 03/07/24 0101          Hold Oral hypoglycemics while patient is in the hospital.          VTE Risk Mitigation (From admission, onward)      None                Thank you for your consult. I will follow-up with patient. Please contact us if you have any additional questions.    Dulce Wesley, BERTIN  Department of Hospital Medicine   Formerly [facility], [facility]

## 2024-03-08 VITALS
BODY MASS INDEX: 32.78 KG/M2 | HEART RATE: 87 BPM | DIASTOLIC BLOOD PRESSURE: 82 MMHG | OXYGEN SATURATION: 98 % | HEIGHT: 66 IN | WEIGHT: 204 LBS | RESPIRATION RATE: 17 BRPM | TEMPERATURE: 99 F | SYSTOLIC BLOOD PRESSURE: 130 MMHG

## 2024-03-08 LAB
ANION GAP SERPL CALC-SCNC: 5 MMOL/L (ref 8–16)
BASOPHILS # BLD AUTO: 0.02 K/UL (ref 0–0.2)
BASOPHILS NFR BLD: 0.3 % (ref 0–1.9)
BUN SERPL-MCNC: 11 MG/DL (ref 8–23)
CALCIUM SERPL-MCNC: 9.8 MG/DL (ref 8.7–10.5)
CHLORIDE SERPL-SCNC: 105 MMOL/L (ref 95–110)
CO2 SERPL-SCNC: 25 MMOL/L (ref 23–29)
CREAT SERPL-MCNC: 1.2 MG/DL (ref 0.5–1.4)
DIFFERENTIAL METHOD BLD: ABNORMAL
EOSINOPHIL # BLD AUTO: 0.2 K/UL (ref 0–0.5)
EOSINOPHIL NFR BLD: 3.1 % (ref 0–8)
ERYTHROCYTE [DISTWIDTH] IN BLOOD BY AUTOMATED COUNT: 11.5 % (ref 11.5–14.5)
EST. GFR  (NO RACE VARIABLE): >60 ML/MIN/1.73 M^2
GLUCOSE SERPL-MCNC: 175 MG/DL (ref 70–110)
HCT VFR BLD AUTO: 39.4 % (ref 40–54)
HGB BLD-MCNC: 13.1 G/DL (ref 14–18)
IMM GRANULOCYTES # BLD AUTO: 0.03 K/UL (ref 0–0.04)
IMM GRANULOCYTES NFR BLD AUTO: 0.4 % (ref 0–0.5)
LYMPHOCYTES # BLD AUTO: 2 K/UL (ref 1–4.8)
LYMPHOCYTES NFR BLD: 26.9 % (ref 18–48)
MCH RBC QN AUTO: 32.9 PG (ref 27–31)
MCHC RBC AUTO-ENTMCNC: 33.2 G/DL (ref 32–36)
MCV RBC AUTO: 99 FL (ref 82–98)
MONOCYTES # BLD AUTO: 0.5 K/UL (ref 0.3–1)
MONOCYTES NFR BLD: 6.8 % (ref 4–15)
NEUTROPHILS # BLD AUTO: 4.7 K/UL (ref 1.8–7.7)
NEUTROPHILS NFR BLD: 62.5 % (ref 38–73)
NRBC BLD-RTO: 0 /100 WBC
OHS QRS DURATION: 86 MS
OHS QTC CALCULATION: 394 MS
PLATELET # BLD AUTO: 197 K/UL (ref 150–450)
PMV BLD AUTO: 8.5 FL (ref 9.2–12.9)
POCT GLUCOSE: 196 MG/DL (ref 70–110)
POCT GLUCOSE: 272 MG/DL (ref 70–110)
POTASSIUM SERPL-SCNC: 4.4 MMOL/L (ref 3.5–5.1)
RBC # BLD AUTO: 3.98 M/UL (ref 4.6–6.2)
SODIUM SERPL-SCNC: 135 MMOL/L (ref 136–145)
WBC # BLD AUTO: 7.48 K/UL (ref 3.9–12.7)

## 2024-03-08 PROCEDURE — 25000003 PHARM REV CODE 250: Performed by: NURSE PRACTITIONER

## 2024-03-08 PROCEDURE — 80048 BASIC METABOLIC PNL TOTAL CA: CPT | Performed by: HOSPITALIST

## 2024-03-08 PROCEDURE — 85025 COMPLETE CBC W/AUTO DIFF WBC: CPT | Performed by: HOSPITALIST

## 2024-03-08 PROCEDURE — 94761 N-INVAS EAR/PLS OXIMETRY MLT: CPT

## 2024-03-08 PROCEDURE — 94760 N-INVAS EAR/PLS OXIMETRY 1: CPT

## 2024-03-08 PROCEDURE — 97161 PT EVAL LOW COMPLEX 20 MIN: CPT

## 2024-03-08 PROCEDURE — 36415 COLL VENOUS BLD VENIPUNCTURE: CPT | Performed by: HOSPITALIST

## 2024-03-08 PROCEDURE — 25000003 PHARM REV CODE 250: Performed by: HOSPITALIST

## 2024-03-08 RX ORDER — LOSARTAN POTASSIUM 50 MG/1
50 TABLET ORAL DAILY
Qty: 90 TABLET | Refills: 0 | Status: SHIPPED | OUTPATIENT
Start: 2024-03-09 | End: 2024-06-10

## 2024-03-08 RX ORDER — HYDRALAZINE HYDROCHLORIDE 50 MG/1
50 TABLET, FILM COATED ORAL EVERY 8 HOURS PRN
Qty: 270 TABLET | Refills: 0 | Status: SHIPPED | OUTPATIENT
Start: 2024-03-08 | End: 2024-06-10

## 2024-03-08 RX ADMIN — ATORVASTATIN CALCIUM 80 MG: 40 TABLET, FILM COATED ORAL at 09:03

## 2024-03-08 RX ADMIN — FAMOTIDINE 20 MG: 20 TABLET, FILM COATED ORAL at 09:03

## 2024-03-08 RX ADMIN — AMLODIPINE BESYLATE 10 MG: 5 TABLET ORAL at 09:03

## 2024-03-08 RX ADMIN — INSULIN ASPART 6 UNITS: 100 INJECTION, SOLUTION INTRAVENOUS; SUBCUTANEOUS at 12:03

## 2024-03-08 RX ADMIN — LOSARTAN POTASSIUM 50 MG: 25 TABLET, FILM COATED ORAL at 09:03

## 2024-03-08 RX ADMIN — INSULIN ASPART 2 UNITS: 100 INJECTION, SOLUTION INTRAVENOUS; SUBCUTANEOUS at 09:03

## 2024-03-08 RX ADMIN — CLOPIDOGREL BISULFATE 75 MG: 75 TABLET, FILM COATED ORAL at 09:03

## 2024-03-08 RX ADMIN — ASPIRIN 81 MG: 81 TABLET ORAL at 09:03

## 2024-03-08 NOTE — PLAN OF CARE
Problem: Adult Inpatient Plan of Care  Goal: Plan of Care Review  Outcome: Met  Goal: Patient-Specific Goal (Individualized)  Outcome: Met  Goal: Absence of Hospital-Acquired Illness or Injury  Outcome: Met  Goal: Optimal Comfort and Wellbeing  Outcome: Met  Goal: Readiness for Transition of Care  Outcome: Met     Problem: Diabetes Comorbidity  Goal: Blood Glucose Level Within Targeted Range  Outcome: Met     Problem: Adjustment to Illness (Stroke, Ischemic/Transient Ischemic Attack)  Goal: Optimal Coping  Outcome: Met     Problem: Bowel Elimination Impaired (Stroke, Ischemic/Transient Ischemic Attack)  Goal: Effective Bowel Elimination  Outcome: Met     Problem: Cerebral Tissue Perfusion (Stroke, Ischemic/Transient Ischemic Attack)  Goal: Optimal Cerebral Tissue Perfusion  Outcome: Met     Problem: Cognitive Impairment (Stroke, Ischemic/Transient Ischemic Attack)  Goal: Optimal Cognitive Function  Outcome: Met     Problem: Communication Impairment (Stroke, Ischemic/Transient Ischemic Attack)  Goal: Improved Communication Skills  Outcome: Met     Problem: Functional Ability Impaired (Stroke, Ischemic/Transient Ischemic Attack)  Goal: Optimal Functional Ability  Outcome: Met     Problem: Respiratory Compromise (Stroke, Ischemic/Transient Ischemic Attack)  Goal: Effective Oxygenation and Ventilation  Outcome: Met     Problem: Sensorimotor Impairment (Stroke, Ischemic/Transient Ischemic Attack)  Goal: Improved Sensorimotor Function  Outcome: Met     Problem: Swallowing Impairment (Stroke, Ischemic/Transient Ischemic Attack)  Goal: Optimal Eating and Swallowing without Aspiration  Outcome: Met     Problem: Urinary Elimination Impaired (Stroke, Ischemic/Transient Ischemic Attack)  Goal: Effective Urinary Elimination  Outcome: Met

## 2024-03-08 NOTE — PLAN OF CARE
Pcp hospital follow up scheduled  Pt is clear for dc from CM       03/08/24 4575   Final Note   Assessment Type Final Discharge Note   Anticipated Discharge Disposition Home   Hospital Resources/Appts/Education Provided Appointments scheduled and added to AVS

## 2024-03-08 NOTE — NURSING
Discharge instructions given to patient. Patient verbalizes understanding. PIV removed. Patient tolerated well. Left floor safely with all belongings.

## 2024-03-08 NOTE — PLAN OF CARE
03/07/24 1930   Patient Assessment/Suction   Level of Consciousness (AVPU) alert   Respiratory Effort Normal;Unlabored   Rhythm/Pattern, Respiratory pattern regular   Cough Frequency no cough   PRE-TX-O2   Device (Oxygen Therapy) room air   SpO2 98 %   Pulse Oximetry Type Intermittent

## 2024-03-08 NOTE — PT/OT/SLP EVAL
Physical Therapy Evaluation and Discharge Note    Patient Name:  Evelio Pandya   MRN:  [id number]    Recommendations:     Discharge Recommendations: No Therapy Indicated  Discharge Equipment Recommendations: none   Barriers to discharge: None    Assessment:     Evelio Pandya is a 84 y.o. male admitted with a medical diagnosis of Hypertensive encephalopathy. Patient is agreeable to participation with PT evaluation. He reports resolution of confusion and slurred speech. He lives with daughter and is independent at baseline. Strength and coordination testing WNL. He performs supine <> sit <> stand with supervision. He ambulated 250' with SBA-CGA and no AD reporting ambulation is at baseline. He is agreeable to sit up in chair with chair alarm on and charge RN notified. At this time, patient is functioning at their prior level of function and does not require further acute PT services.     Recent Surgery: * No surgery found *      Plan:     During this hospitalization, patient does not require further acute PT services.  Please re-consult if situation changes.      Subjective     Chief Complaint: states he sometimes gets a little dizzy when he first gets up in the morning   Patient/Family Comments/goals: home  Pain/Comfort:  Pain Rating 1: 0/10    Patients cultural, spiritual, Christianity conflicts given the current situation:      Living Environment:  Patient lives with daughter and CHRISTA in a H with no ROGELIO. He reports 24/7 supervision from family.   Prior to admission, patients level of function was independent. He denies any recent falls or PT.  Equipment used at home: none.  DME owned (not currently used): none.  Upon discharge, patient will have assistance from family.    Objective:     Communicated with charge RN Oscar prior to session.  Patient found HOB elevated with telemetry upon PT entry to room.    General Precautions: Standard, fall    Orthopedic Precautions:N/A   Braces: N/A  Respiratory Status: Room  air    Exams:  Fine Motor Coordination:    -       Intact  Left hand, finger to nose, Right hand, finger to nose, Left hand thumb/finger opposition skills, Right hand thumb/finger opposition skills, Left hand, diadochokinesis skill , and Right hand, diadochokinesis skill   RLE Strength: WFL  LLE Strength: WFL    Functional Mobility:  Bed Mobility:     Supine to Sit: supervision  Transfers:     Sit to Stand:  supervision with no AD  Gait: 250' with SBA-CGA and no AD reporting ambulation is at baseline    AM-PAC 6 CLICK MOBILITY  Total Score:23       Treatment and Education:  Patient was educated on the importance of OOB activity and functional mobility to negate negative effects of prolonged bed rest during hospitalization, safe transfers and ambulation, increased time for positional changes due to history of dizziness to prevent falls, and D/C planning     AM-PAC 6 CLICK MOBILITY  Total Score:23     Patient left up in chair with all lines intact, call button in reach, chair alarm on, and charge RN notified.    GOALS:   Multidisciplinary Problems       Physical Therapy Goals       Not on file                    History:     Past Medical History:   Diagnosis Date    Diabetes mellitus     Hypertension     TIA (transient ischemic attack)     Urinary retention        Past Surgical History:   Procedure Laterality Date    CATARACT EXTRACTION Right     GLAUCOMA SURGERY Right        Time Tracking:     PT Received On: 03/08/24  PT Start Time: 1039     PT Stop Time: 1054  PT Total Time (min): 15 min     Billable Minutes: Evaluation 15      03/08/2024

## 2024-03-08 NOTE — PLAN OF CARE
POC/Meds reviewed, pt verbalized understanding. Vitals stable.  Afebrile. Tele In place-[id number]. Accuchecks monitored.   Up with stand by assist. Repositions self. Hourly/Q2hr rounding performed, safety maintained. Bed in lowest position, wheels locked, SR up x2, call light in easy reach. No  complaints at this time.

## 2024-03-08 NOTE — PROGRESS NOTES
[facility]  Department of Neurology  Progress Note  Date: 2024 1:14 PM          Patient Name: Evelio Pandya   MRN: [id number]   : 1940    AGE: 84 y.o.    LOS: 1 days Hospital Day: 3  Admit date: 3/6/2024  3:31 PM       HPI per EMR: Evelio Pandya is a 84 y.o. male with a history of DM, HTN, TIA who presented to the ED with stroke-like symptoms that resolved just prior to arrival. He reported slurred speech as well as word finding difficulties at around 130 pm today, but have since resolved. He states he has had similar episodes several times in the past month. He denied other complaint. A Code stroke was initiated on arrival to the ED, and the ED physician Betsy spoke with neurologist . The patient was found to not be a tPA candidate as he was back to baseline. CT imaging did not show an acute stroke, but did show stenosis of carotid vasculature of 40% on right and 50% on left side. Dr. Navarro recommended transfer to [facility] for further neurovascular evaluation, continue aspirin, Plavix and atorvastatin, obtain an MRI and Bilateral carotid ultrasound. Transfer center was contacted to arrange transfer, but no beds available within the next 24 hours. Patient will hold in ED for now, hospital medicine consulted.      Neurology consult:  Patient was seen examined by me.  History was obtained by family at bedside and over phone.  Apparently yesterday, patient had an episode of confusion where he was not able to communicate and was repeating himself.  There was no slurred speech or focal weakness at that time.  Patient did not have any sensory changes or weakness or vision trouble prior to the episode.  As per family, patient is noncompliant with his medications at home.  By the time he presented to the ER, his symptoms have improved and he was back to baseline.     Per family, he has had multiple episodes of confusion over the past 2 years and the 1st episode was after  "urinary tract infection.  There was no history of stroke in the past.  There was concern for TIAs in the past.     Currently patient is back to baseline and he denies any complaints at this time.  During my encounter, his blood pressure was in 200s systolic.       03/08/2024: No acute events overnight. Patient was seen and examined by me this morning. Neuro exam is normal this morning.  He feels that he is back to baseline.  Blood pressure has been improving.       Vitals:  Patient Vitals for the past 24 hrs:   BP Temp Temp src Pulse Resp SpO2 Height Weight   03/08/24 1100 130/82 98.7 °F (37.1 °C) Oral 87 17 98 % -- --   03/08/24 1011 -- -- -- 90 18 100 % -- --   03/08/24 0800 (!) 140/77 97.7 °F (36.5 °C) Oral 72 16 99 % -- --   03/08/24 0719 (!) 149/77 97.1 °F (36.2 °C) Oral 72 16 99 % -- --   03/08/24 0508 (!) 141/80 98.8 °F (37.1 °C) Oral 83 17 98 % -- --   03/08/24 0205 (!) 152/85 98.9 °F (37.2 °C) Oral 72 17 97 % -- --   03/07/24 2310 (!) 159/84 99 °F (37.2 °C) Oral 85 19 97 % -- --   03/07/24 2000 (!) 151/77 98.1 °F (36.7 °C) Oral 81 18 99 % -- --   03/07/24 1930 -- -- -- -- -- 98 % -- --   03/07/24 1701 (!) 160/78 97.8 °F (36.6 °C) -- 82 16 99 % -- --   03/07/24 1556 -- -- -- 80 18 99 % -- --   03/07/24 1500 -- -- -- -- -- -- 5' 6" (1.676 m) 92.5 kg (204 lb)   03/07/24 1400 (!) 162/84 -- -- 74 18 99 % -- --     PHYSICAL EXAM:     GENERAL APPEARANCE: Well-developed, well-nourished male in no acute distress.  HEENT: Normocephalic and atraumatic. PERRL (baseline exotropia on the right eye). Oropharynx unremarkable.  PULM: Comfortable on room air.  CV: RRR.  ABDOMEN: Soft, nontender.  EXTREMITIES: No signs of vascular compromise. Pulses present. No cyanosis, clubbing or edema.  SKIN: Clear; no rashes, lesions or skin breaks in exposed areas.      NEURO:   MENTAL STATUS: Patient awake and oriented to time, place, and person. Affect normal.  CRANIAL NERVES II-XII: Pupils equal, round and reactive to light. " "Extraocular movements full and intact. No facial asymmetry.  Baseline exotropia on the right eye  MOTOR: Normal bulk. Tone normal and symmetrical throughout.  No abnormal movements. No tremor.   Strength 5/5 throughout unless specified below.  REFLEXES: DTRs 2+; normal and symmetric throughout.   SENSATION: Sensation grossly intact to fine touch.  COORDINATION: Finger-to-nose normal for age and symmetric.  STATION: Romberg deferred.  GAIT: Deferred.    CURRENT SCHEDULED MEDICATIONS:   amLODIPine  10 mg Oral Daily    aspirin  81 mg Oral Daily    atorvastatin  80 mg Oral Daily    clopidogreL  75 mg Oral Daily    famotidine  20 mg Oral Daily    insulin detemir U-100 (Levemir)  15 Units Subcutaneous QHS    losartan  50 mg Oral Daily    tamsulosin  0.4 mg Oral QHS     CURRENT INFUSIONS:    DATA:  Recent Labs   Lab 03/06/24  1538 03/08/24  0556    135*   K 4.0 4.4    105   CO2 23 25   BUN 18 11   CREATININE 1.4 1.2    175*   CALCIUM 8.9 9.8   AST 13  --    ALT 14  --      Recent Labs   Lab 03/06/24  1538 03/08/24  0556   WBC 8.85 7.48   HGB 12.0* 13.1*   HCT 35.9* 39.4*    197     No results found for: "PROTEINCSF", "GLUCCSF", "WBCCSF", "RBCCSF", "PMNCSF"  Hemoglobin A1C   Date Value Ref Range Status   02/09/2024 6.2 (H) 4.0 - 5.6 % Final     Comment:     ADA Screening Guidelines:  5.7-6.4%  Consistent with prediabetes  >or=6.5%  Consistent with diabetes    High levels of fetal hemoglobin interfere with the HbA1C  assay. Heterozygous hemoglobin variants (HbS, HgC, etc)do  not significantly interfere with this assay.   However, presence of multiple variants may affect accuracy.     10/11/2023 7.9 (H) 4.0 - 5.6 % Final     Comment:     ADA Screening Guidelines:  5.7-6.4%  Consistent with prediabetes  >or=6.5%  Consistent with diabetes    High levels of fetal hemoglobin interfere with the HbA1C  assay. Heterozygous hemoglobin variants (HbS, HgC, etc)do  not significantly interfere with this assay. "   However, presence of multiple variants may affect accuracy.     07/11/2023 6.4 (H) 4.0 - 5.6 % Final     Comment:     ADA Screening Guidelines:  5.7-6.4%  Consistent with prediabetes  >or=6.5%  Consistent with diabetes    High levels of fetal hemoglobin interfere with the HbA1C  assay. Heterozygous hemoglobin variants (HbS, HgC, etc)do  not significantly interfere with this assay.   However, presence of multiple variants may affect accuracy.              I have personally reviewed and interpreted the pertinent imaging and lab results.  Imaging Results              US Carotid Bilateral (Final result)  Result time 03/07/24 08:37:48      Final result by Anny Hopkins MD (03/07/24 08:37:48)                   Impression:      No evidence of a hemodynamically significant carotid bifurcation stenosis.    Flow in vertebral arteries appears antegrade bilaterally.      Electronically signed by: Anny Hopkins MD  Date:    03/07/2024  Time:    08:37               Narrative:    EXAMINATION:  US CAROTID BILATERAL    CLINICAL HISTORY:  carotid stenosis, stroke work up;    TECHNIQUE:  Grayscale and color Doppler ultrasound examination of the carotid and vertebral artery systems bilaterally.  Stenosis estimates are per the NASCET measurement criteria.    COMPARISON:  None    FINDINGS:  Right:    Internal Carotid Artery (ICA) peak systolic velocity 99 cm/sec    ICA/CCA peak systolic velocity ratio: 1.1    Plaque formation: Appears moderate    Vertebral artery: Antegrade flow and normal waveform.    Left:    Internal Carotid Artery (ICA)  peak systolic velocity 102 cm/sec    ICA/CCA peak systolic velocity ratio: 1.7    Plaque formation: Appears moderate    Vertebral artery: Antegrade flow and normal waveform.                                       MRI Brain Without Contrast (Final result)  Result time 03/07/24 08:47:21      Final result by Jet Max MD (03/07/24 08:47:21)                   Impression:      1. No recent  infarct or other acute intracranial process.  2. Moderate to advanced chronic small vessel ischemic changes.      Electronically signed by: Jet Max  Date:    03/07/2024  Time:    08:47               Narrative:    EXAMINATION:  MRI BRAIN WITHOUT CONTRAST    CLINICAL HISTORY:  Stroke, follow up;.    TECHNIQUE:  Multiplanar multisequence MR imaging of the brain was performed without contrast    COMPARISON:  CTA 03/06/2024.  MRI (report only) 09/07/2022.    FINDINGS:  Brain: No acute infarct or intracranial hemorrhage.  No midline shift or mass effect or space-occupying extra-axial fluid collection.  Redemonstrated confluent T2/FLAIR hyperintensity throughout the cerebral hemispheric white matter consistent with moderate to severe chronic small vessel ischemic changes as well as chronic lacunar type infarcts within the bilateral cerebellum and bilateral centrum semiovale.  Moderate global involutional changes.  Midline Structures: The midline structures of the brain are normal.  Ventricles: The ventricles, sulci and cisterns are within normal limits.  Vasculature: The vascular flow voids at the base of the brain are within normal limits.  Calvarium: The visualized osseous structures are unremarkable.  Sinuses: The paranasal sinuses are adequately aerated.  Orbits: No significant abnormality.  Mastoids: Trace left mastoid effusion.  Extracranial soft tissues: The visualized extracranial soft tissues are unremarkable.                                       CTA Head and Neck (xpd) (Final result)  Result time 03/06/24 15:57:20      Final result by Lewis Kohler MD (03/06/24 15:57:20)                   Impression:      1. There is no acute abnormality or definite change in the appearance of the brain compared to the prior CT.  There is generalized volume loss with a marked burden of nonspecific white matter change.  There is, however, no hemorrhage, mass or obvious acute infarction.  It should be noted that MRI is  more sensitive in the detection of subtle or acute nonhemorrhagic ischemic disease.  2. There is plaque at the carotid bulb bilaterally but there is no critical stenosis.  Stenosis of the proximal right internal carotid artery is approximately 40% and on the left approximately 50%.  3. The vertebral arteries are patent.  4. Intracranially, there is no large vessel occlusion or critical stenosis.      Electronically signed by: Lewis Kohler MD  Date:    03/06/2024  Time:    15:57               Narrative:    EXAMINATION:  CTA HEAD AND NECK (XPD)    CLINICAL HISTORY:  Neuro deficit, acute, stroke suspected;    TECHNIQUE:  Non contrast low dose axial images were obtained thought the head.  CT angiogram was performed from the level of the heather to the top of the head following the IV administration of 75 mL of Omnipaque 350.   Sagittal and coronal reconstructions and maximum intensity projection reconstructions were performed. Arterial stenosis percentages are based on NASCET measurement criteria.    COMPARISON:  Head CT dated 12/21/2021    FINDINGS:  Head CT:    There is no acute abnormality or detectable change in the appearance of the brain compared to the prior study.  There is generalized volume loss with a marked burden of diffuse and confluent white matter hypodensity.  The white matter changes are nonspecific but may reflect sequelae of rather severe chronic small vessel disease.  There is no intracranial hemorrhage.  There is no mass.  The gray-white interface appears preserved without acute infarction.  There is no abnormal extra-axial fluid collection.  There is no abnormal enhancement in the brain on the delayed post-contrast images.    The nasal septum is mildly bowed to the left.  There is scattered mucosal thickening in the ethmoid air cells with a small mucous retention cyst in the left sphenoid sinus.  The mastoid air cells are clear.  There is calcified plaque in the vessels at the base of the  brain.  The calvarium is normal.    CTA Neck:    Arch and great vessels:    There is a left-sided 3 vessel arch with common origin of the brachiocephalic trunk and left common carotid artery.  The aortic arch and the origins of the great vessels are widely patent.  The included subclavian arteries are patent as well.    Carotid arteries:    Right Carotid artery: There is calcified plaque at the carotid bulb and proximal internal carotid artery but stenosis of the internal carotid artery is approximately 40%.  The internal carotid artery is patent to the skull base.    Left Carotid artery: There is mild scattered plaque in the common carotid artery.  There is also plaque at the carotid bulb and proximal internal carotid artery.  Stenosis of the proximal internal carotid artery is approximately 50%.  The vessel is patent to the skull base.    Vertebral arteries:    The left vertebral artery is slightly dominant.  Both vertebral arteries are patent throughout their course in the neck.  There is no critical stenosis, occlusion, thrombus or obvious dissection.    Other:    There is no focal infiltrate or mass in the included upper lungs.  There is respiratory motion.  The airway is patent.  There is no pathologic lymphadenopathy or dominant soft tissue mass in the neck.    CTA Head:    Anterior circulation:    The petrous segments of the internal carotid arteries are patent.  There is calcified plaque in the cavernous segments bilaterally resulting in intermittent nonocclusive stenosis.  The supraclinoid internal carotid arteries are patent.  The carotid terminus is normal bilaterally.  There is branching into the anterior and middle cerebral arteries.  There is no large vessel occlusion or critical stenosis.  There is no large aneurysm.  There is no obvious thrombus or dissection.    Posterior circulation:    The vertebral and basilar arteries are patent.  The basilar tip is normal.  There is branching into the superior  cerebellar and posterior cerebral arteries.  Faint bilateral posterior communicating arteries are present.  There is no large vessel occlusion or critical stenosis.  There is no thrombus, dissection or large aneurysm.    Dural sinuses, other:    The dural sinuses are patent.                                              ASSESSMENT AND PLAN:     Encephalopathy   Hypertensive emergency     Plan:   Patient had an episode of encephalopathy versus aphasia.  Etiology could likely be hypertensive emergency versus TIA.  CT angiogram showed bilateral carotid stenosis in the moderate range about 40% on the right and 50% on the left.  MRI brain was negative for acute pathology.  Carotid ultrasound showed no high-grade stenosis.  Blood pressure improving now.  Patient feels back to baseline  Recommend aspirin 81 mg, Plavix 75 mg and Lipitor 40 mg for stroke prevention.  Dual antiplatelet therapy for 3 weeks followed by monotherapy with aspirin.  Educated patient about medication compliance  Outpatient vascular surgery evaluation for carotid stenosis.  PT OT and speech therapy evaluate and treat  Risk factor stratification with lipid panel and hemoglobin A1c  Extensively discussed lifestyle modifications as prophylactic measures for stroke prevention including, adequate blood pressure management, healthy diet and regular exercise.  Okay to be discharged from Neurology standpoint.         Ru Douglass MD  Neurology/vascular Neurology  Date of Service: 03/08/2024  1:14 PM    Please note: This note was transcribed using voice recognition software. Because of this technology there are often uinintended grammatical, spelling, and other transcription errors. Please disregard these errors.

## 2024-03-14 ENCOUNTER — PATIENT OUTREACH (OUTPATIENT)
Dept: ADMINISTRATIVE | Facility: CLINIC | Age: 84
End: 2024-03-14
Payer: MEDICARE

## 2024-03-14 NOTE — PROGRESS NOTES
C3 nurse attempted to contact Evelio Pandya's daughter Georgia for a TCC post hospital discharge follow up call. No answer. Left voicemail with callback information. The patient has a scheduled HOS appointment with Yamil Gutierrez on 03/18/24 @ 3250.

## 2024-03-15 NOTE — PROGRESS NOTES
2nd attempt to make TCC Call. C3 nurse attempted to contact Evelio Pandya's daughter Georgia for a TCC post hospital discharge follow up call. The patient is unable to conduct the call @ this time. The patient requested a callback.    The patient has a scheduled HOS appointment with Yamil Gutierrez DO   on 03/18/24 @ [id number].

## 2024-03-15 NOTE — TELEPHONE ENCOUNTER
Metformin 500 - Taking 2 tablets once a day every evening.  
initiation of breastfeeding/breast milk feeding

## 2024-03-15 NOTE — PROGRESS NOTES
C3 nurse spoke with Evelio Pandya's daughter Georgia for a TCC post hospital discharge follow up call. The patient has a scheduled HOSFU appointment with Jazmín Hallman on 03/18/24 @ [id number].

## 2024-03-18 ENCOUNTER — OFFICE VISIT (OUTPATIENT)
Dept: FAMILY MEDICINE | Facility: CLINIC | Age: 84
End: 2024-03-18
Payer: MEDICARE

## 2024-03-18 VITALS
DIASTOLIC BLOOD PRESSURE: 78 MMHG | OXYGEN SATURATION: 99 % | HEART RATE: 106 BPM | HEIGHT: 66 IN | WEIGHT: 197.31 LBS | SYSTOLIC BLOOD PRESSURE: 130 MMHG | BODY MASS INDEX: 31.71 KG/M2

## 2024-03-18 DIAGNOSIS — Z79.4 TYPE 2 DIABETES MELLITUS WITH HYPERGLYCEMIA, WITH LONG-TERM CURRENT USE OF INSULIN: ICD-10-CM

## 2024-03-18 DIAGNOSIS — R30.0 DYSURIA: ICD-10-CM

## 2024-03-18 DIAGNOSIS — G45.9 TIA (TRANSIENT ISCHEMIC ATTACK): ICD-10-CM

## 2024-03-18 DIAGNOSIS — N30.01 ACUTE CYSTITIS WITH HEMATURIA: ICD-10-CM

## 2024-03-18 DIAGNOSIS — E66.9 OBESITY, CLASS I, BMI 30-34.9: ICD-10-CM

## 2024-03-18 DIAGNOSIS — E11.65 TYPE 2 DIABETES MELLITUS WITH HYPERGLYCEMIA, WITH LONG-TERM CURRENT USE OF INSULIN: ICD-10-CM

## 2024-03-18 DIAGNOSIS — N18.31 STAGE 3A CHRONIC KIDNEY DISEASE: ICD-10-CM

## 2024-03-18 DIAGNOSIS — Z09 HOSPITAL DISCHARGE FOLLOW-UP: Primary | ICD-10-CM

## 2024-03-18 LAB
BILIRUBIN, UA POC OHS: ABNORMAL
BLOOD, UA POC OHS: ABNORMAL
CLARITY, UA POC OHS: ABNORMAL
COLOR, UA POC OHS: ABNORMAL
GLUCOSE, UA POC OHS: NEGATIVE
KETONES, UA POC OHS: 15
LEUKOCYTES, UA POC OHS: ABNORMAL
NITRITE, UA POC OHS: POSITIVE
PH, UA POC OHS: 5
PROTEIN, UA POC OHS: >=300
SPECIFIC GRAVITY, UA POC OHS: >=1.03
UROBILINOGEN, UA POC OHS: 1

## 2024-03-18 PROCEDURE — 87088 URINE BACTERIA CULTURE: CPT | Performed by: FAMILY MEDICINE

## 2024-03-18 PROCEDURE — 1160F RVW MEDS BY RX/DR IN RCRD: CPT | Mod: CPTII,S$GLB,, | Performed by: FAMILY MEDICINE

## 2024-03-18 PROCEDURE — 99214 OFFICE O/P EST MOD 30 MIN: CPT | Mod: S$GLB,,, | Performed by: FAMILY MEDICINE

## 2024-03-18 PROCEDURE — 1111F DSCHRG MED/CURRENT MED MERGE: CPT | Mod: CPTII,S$GLB,, | Performed by: FAMILY MEDICINE

## 2024-03-18 PROCEDURE — 81001 URINALYSIS AUTO W/SCOPE: CPT | Performed by: FAMILY MEDICINE

## 2024-03-18 PROCEDURE — 1101F PT FALLS ASSESS-DOCD LE1/YR: CPT | Mod: CPTII,S$GLB,, | Performed by: FAMILY MEDICINE

## 2024-03-18 PROCEDURE — 3075F SYST BP GE 130 - 139MM HG: CPT | Mod: CPTII,S$GLB,, | Performed by: FAMILY MEDICINE

## 2024-03-18 PROCEDURE — 81003 URINALYSIS AUTO W/O SCOPE: CPT | Mod: QW,S$GLB,, | Performed by: FAMILY MEDICINE

## 2024-03-18 PROCEDURE — 99999 PR PBB SHADOW E&M-EST. PATIENT-LVL IV: CPT | Mod: PBBFAC,,, | Performed by: FAMILY MEDICINE

## 2024-03-18 PROCEDURE — 87186 SC STD MICRODIL/AGAR DIL: CPT | Performed by: FAMILY MEDICINE

## 2024-03-18 PROCEDURE — 87086 URINE CULTURE/COLONY COUNT: CPT | Performed by: FAMILY MEDICINE

## 2024-03-18 PROCEDURE — 1159F MED LIST DOCD IN RCRD: CPT | Mod: CPTII,S$GLB,, | Performed by: FAMILY MEDICINE

## 2024-03-18 PROCEDURE — 3072F LOW RISK FOR RETINOPATHY: CPT | Mod: CPTII,S$GLB,, | Performed by: FAMILY MEDICINE

## 2024-03-18 PROCEDURE — 3288F FALL RISK ASSESSMENT DOCD: CPT | Mod: CPTII,S$GLB,, | Performed by: FAMILY MEDICINE

## 2024-03-18 PROCEDURE — 1126F AMNT PAIN NOTED NONE PRSNT: CPT | Mod: CPTII,S$GLB,, | Performed by: FAMILY MEDICINE

## 2024-03-18 PROCEDURE — 87077 CULTURE AEROBIC IDENTIFY: CPT | Performed by: FAMILY MEDICINE

## 2024-03-18 PROCEDURE — 3078F DIAST BP <80 MM HG: CPT | Mod: CPTII,S$GLB,, | Performed by: FAMILY MEDICINE

## 2024-03-18 RX ORDER — SEMAGLUTIDE 0.68 MG/ML
INJECTION, SOLUTION SUBCUTANEOUS
Qty: 3 ML | Refills: 0 | Status: SHIPPED | OUTPATIENT
Start: 2024-03-18 | End: 2024-05-08

## 2024-03-18 RX ORDER — NITROFURANTOIN 25; 75 MG/1; MG/1
100 CAPSULE ORAL 2 TIMES DAILY
Qty: 20 CAPSULE | Refills: 0 | Status: SHIPPED | OUTPATIENT
Start: 2024-03-18 | End: 2024-03-21 | Stop reason: ALTCHOICE

## 2024-03-18 RX ORDER — SEMAGLUTIDE 0.68 MG/ML
INJECTION, SOLUTION SUBCUTANEOUS
Qty: 3 ML | Refills: 0 | Status: SHIPPED | OUTPATIENT
Start: 2024-03-18 | End: 2024-03-18 | Stop reason: SDUPTHER

## 2024-03-18 NOTE — PROGRESS NOTES
Subjective:       Patient ID: Evelio Pandya is a 84 y.o. male.    Chief Complaint: Hospital Follow Up    Transitional Care Note    Family and/or Caretaker present at visit?  Yes.  Diagnostic tests reviewed/disposition: No diagnosic tests pending after this hospitalization.  Disease/illness education: TIA  Home health/community services discussion/referrals: Patient does not have home health established from hospital visit.  They do not need home health.  If needed, we will set up home health for the patient.   Establishment or re-establishment of referral orders for community resources: No other necessary community resources.   Discussion with other health care providers: No discussion with other health care providers necessary.        This patient is new to me.  Evelio Pandya presents to the clinic today for hospital discharge follow up. Patient states he has been having urinary frequency since he was in the hospital.   Patient's daughter with him today reports he went to be with her brother for a few days and had the TIA. She reports when he goes with her brother he does not take his medications and does not eat right. Patient reports it will not happen again as he does not like being sick.   Patient educated on plan of care, verbalized understanding.         Discharge note on 3/8/2024  HPI:   Evelio Pandya is an 84 year old male with a past medical history of DM, HTN, TIA who presented to the ED with stroke-like symptoms that resolved just prior to arrival. He reported slurred speech as well as word finding difficulties at around 130 pm today, but have since resolved. He states he has had similar episodes several times in the past month. He denied other complaint. A Code stroke was initiated on arrival to the ED, and the ED physician Betsy spoke with neurologist . The patient was found to not be a tPA candidate as he was back to baseline. CT imaging did not show an acute stroke, but did show stenosis  of carotid vasculature of 40% on right and 50% on left side. Dr. Navarro recommended transfer to [facility] for further neurovascular evaluation, continue aspirin, Plavix and atorvastatin, obtain an MRI and Bilateral carotid ultrasound.  I spoke with in-house neurologist Dr. Douglass, patient's symptoms is more consistent with hypertensive encephalopathy, patient's blood pressure is more than 200, patient has missed his blood pressure medications, no need to transfer to [facility].  We will admit patient to our hospital for for the management of hypertensive crisis.      * No surgery found *       Hospital Course:       Patient admitted to hospitalist for further management, further inquiry with family shows the patient symptoms more likely transient confusion instead of slow speech, patient has no focal deficits, patient did miss his blood pressure medication since he has been visiting his family members.  Patient's blood pressure personally more than 200 during my evaluation.  Patient's symptomology more likely consistent with hypertensive encephalopathy, MRI of the brain is negative for acute CVA.  We will gradually add on losartan and hydralazine, blood pressure controlled.  Patient cleared for discharge home.  Patient already on aspirin Plavix and Lipitor at home.   Review of Systems   Constitutional:  Negative for activity change, appetite change, chills, diaphoresis and fever.   HENT:  Negative for congestion, ear pain, postnasal drip, sinus pressure, sneezing and sore throat.    Eyes:  Negative for pain, discharge, redness and itching.   Respiratory:  Negative for apnea, cough, chest tightness, shortness of breath and wheezing.    Cardiovascular:  Negative for chest pain and leg swelling.   Gastrointestinal:  Negative for abdominal distention, abdominal pain, constipation, diarrhea, nausea and vomiting.   Genitourinary:  Positive for frequency. Negative for difficulty urinating, dysuria and flank pain.    Skin:  Negative for color change, rash and wound.   Neurological:  Negative for dizziness.       Patient Active Problem List   Diagnosis    Type 2 diabetes mellitus with hyperglycemia, with long-term current use of insulin    Hypertensive crisis    BPH (benign prostatic hyperplasia)    Primary open angle glaucoma (POAG) of both eyes, severe stage    Chronic kidney disease, stage 3 unspecified    Morbid (severe) obesity with alveolar hypoventilation    TIA (transient ischemic attack)    Carotid stenosis    Hypertensive encephalopathy       Objective:      Physical Exam  Vitals reviewed.   Constitutional:       General: He is not in acute distress.     Appearance: Normal appearance. He is well-developed.   HENT:      Head: Normocephalic.      Nose: Nose normal.   Eyes:      Conjunctiva/sclera: Conjunctivae normal.      Pupils: Pupils are equal, round, and reactive to light.   Cardiovascular:      Rate and Rhythm: Normal rate and regular rhythm.      Heart sounds: Normal heart sounds.   Pulmonary:      Effort: Pulmonary effort is normal. No respiratory distress.      Breath sounds: Normal breath sounds.   Musculoskeletal:      Cervical back: Normal range of motion and neck supple.   Skin:     General: Skin is warm and dry.      Findings: No rash.   Neurological:      Mental Status: He is alert and oriented to person, place, and time.   Psychiatric:         Mood and Affect: Mood normal.         Behavior: Behavior normal.         Lab Results   Component Value Date    WBC 7.48 03/08/2024    HGB 13.1 (L) 03/08/2024    HCT 39.4 (L) 03/08/2024     03/08/2024    CHOL 112 (L) 03/06/2024    TRIG 88 03/06/2024    HDL 39 (L) 03/06/2024    ALT 14 03/06/2024    AST 13 03/06/2024     (L) 03/08/2024    K 4.4 03/08/2024     03/08/2024    CREATININE 1.2 03/08/2024    BUN 11 03/08/2024    CO2 25 03/08/2024    TSH 1.360 03/06/2024    INR 1.1 03/06/2024    HGBA1C 6.2 (H) 02/09/2024     The ASCVD Risk score (Steven FARAH,  "et al., 2019) failed to calculate for the following reasons:    The 2019 ASCVD risk score is only valid for ages 40 to 79  Visit Vitals  /78 (BP Location: Right arm, Patient Position: Sitting, BP Method: Medium (Manual))   Pulse 106   Ht 5' 6" (1.676 m)   Wt 89.5 kg (197 lb 5 oz)   SpO2 99%   BMI 31.85 kg/m²      Assessment:       1. Hospital discharge follow-up    2. TIA (transient ischemic attack)    3. Obesity, Class I, BMI 30-34.9    4. Dysuria    5. Type 2 diabetes mellitus with hyperglycemia, with long-term current use of insulin    6. Stage 3a chronic kidney disease    7. Acute cystitis with hematuria        Plan:       Evelio was seen today for hospital follow up.    Diagnoses and all orders for this visit:    Hospital discharge follow-up    TIA (transient ischemic attack)  Continue medications as prescribed.  Follow up with PCP    Obesity, Class I, BMI 30-34.9  Body mass index is 31.85 kg/m².  Continue healthy diet and regular exercise as tolerated.  Continue medications as prescribed.  Follow up with PCP    Dysuria / Acute cystitis with hematuria  -     nitrofurantoin, macrocrystal-monohydrate, (MACROBID) 100 MG capsule; Take 1 capsule (100 mg total) by mouth 2 (two) times daily. for 10 days  -     POCT Urinalysis(Instrument)  -     CULTURE, URINE  -     Urinalysis Microscopic  Continue medications as prescribed.  Follow up with PCP    Type 2 diabetes mellitus with hyperglycemia, with long-term current use of insulin  -     Hemoglobin A1C; Standing  -     semaglutide (OZEMPIC) 0.25 mg or 0.5 mg (2 mg/3 mL) pen injector; Inject 0.25 mg into the skin every 7 days for 14 days, THEN 0.5 mg every 7 days for 21 days.  Stable  Continue medications as prescribed.  Follow up with PCP    Stage 3a chronic kidney disease  Stable  Continue medications as prescribed.  Follow up with PCP          Follow up in about 1 month (around 4/18/2024), or if symptoms worsen or fail to improve.      Future Appointments       " Date Provider Specialty Appt Notes    4/12/2024  Lab .    4/15/2024 Jazmín Hallman, NP Family Medicine 1 mo f/u DM    4/22/2024  Ophthalmology 4-5 mo HVF IOP    4/22/2024 Han Peters MD Ophthalmology rev HVF / IOP ck    5/6/2024 Natali East, FNP Urology 3m f/u    6/5/2024 Yamil Gutierrez,  Family Medicine [facility]  4 mo f/u               All of your core healthy metrics are met.

## 2024-03-19 LAB
BACTERIA #/AREA URNS AUTO: ABNORMAL /HPF
MICROSCOPIC COMMENT: ABNORMAL
RBC #/AREA URNS AUTO: >100 /HPF (ref 0–4)
WBC #/AREA URNS AUTO: >100 /HPF (ref 0–5)
WBC CLUMPS UR QL AUTO: ABNORMAL

## 2024-03-21 ENCOUNTER — HOSPITAL ENCOUNTER (EMERGENCY)
Facility: HOSPITAL | Age: 84
Discharge: HOME | End: 2024-03-21
Attending: EMERGENCY MEDICINE
Payer: MEDICARE

## 2024-03-21 VITALS
HEART RATE: 96 BPM | TEMPERATURE: 98 F | WEIGHT: 197.31 LBS | RESPIRATION RATE: 16 BRPM | SYSTOLIC BLOOD PRESSURE: 116 MMHG | OXYGEN SATURATION: 96 % | BODY MASS INDEX: 31.85 KG/M2 | DIASTOLIC BLOOD PRESSURE: 69 MMHG

## 2024-03-21 DIAGNOSIS — N50.812 LEFT TESTICULAR PAIN: ICD-10-CM

## 2024-03-21 DIAGNOSIS — N45.3 EPIDIDYMO-ORCHITIS WITHOUT ABSCESS: Primary | ICD-10-CM

## 2024-03-21 LAB
ALBUMIN SERPL BCP-MCNC: 3.3 G/DL (ref 3.5–5.2)
ALP SERPL-CCNC: 93 U/L (ref 55–135)
ALT SERPL W/O P-5'-P-CCNC: 28 U/L (ref 10–44)
ANION GAP SERPL CALC-SCNC: 7 MMOL/L (ref 8–16)
AST SERPL-CCNC: 31 U/L (ref 10–40)
BACTERIA #/AREA URNS HPF: ABNORMAL /HPF
BACTERIA UR CULT: ABNORMAL
BASOPHILS # BLD AUTO: 0.05 K/UL (ref 0–0.2)
BASOPHILS NFR BLD: 0.3 % (ref 0–1.9)
BILIRUB SERPL-MCNC: 2.3 MG/DL (ref 0.1–1)
BILIRUB UR QL STRIP: NEGATIVE
BUN SERPL-MCNC: 22 MG/DL (ref 8–23)
CALCIUM SERPL-MCNC: 9.7 MG/DL (ref 8.7–10.5)
CHLORIDE SERPL-SCNC: 103 MMOL/L (ref 95–110)
CLARITY UR: ABNORMAL
CO2 SERPL-SCNC: 25 MMOL/L (ref 23–29)
COLOR UR: ABNORMAL
CREAT SERPL-MCNC: 1.5 MG/DL (ref 0.5–1.4)
DIFFERENTIAL METHOD BLD: ABNORMAL
EOSINOPHIL # BLD AUTO: 0.3 K/UL (ref 0–0.5)
EOSINOPHIL NFR BLD: 1.6 % (ref 0–8)
ERYTHROCYTE [DISTWIDTH] IN BLOOD BY AUTOMATED COUNT: 11.4 % (ref 11.5–14.5)
EST. GFR  (NO RACE VARIABLE): 46 ML/MIN/1.73 M^2
GLUCOSE SERPL-MCNC: 175 MG/DL (ref 70–110)
GLUCOSE UR QL STRIP: NEGATIVE
GRAN CASTS #/AREA URNS LPF: 1 /LPF
HCT VFR BLD AUTO: 34.7 % (ref 40–54)
HGB BLD-MCNC: 11.4 G/DL (ref 14–18)
HGB UR QL STRIP: NEGATIVE
HYALINE CASTS #/AREA URNS LPF: 10 /LPF
IMM GRANULOCYTES # BLD AUTO: 0.28 K/UL (ref 0–0.04)
IMM GRANULOCYTES NFR BLD AUTO: 1.4 % (ref 0–0.5)
KETONES UR QL STRIP: NEGATIVE
LEUKOCYTE ESTERASE UR QL STRIP: ABNORMAL
LYMPHOCYTES # BLD AUTO: 2.1 K/UL (ref 1–4.8)
LYMPHOCYTES NFR BLD: 10.3 % (ref 18–48)
MCH RBC QN AUTO: 32.6 PG (ref 27–31)
MCHC RBC AUTO-ENTMCNC: 32.9 G/DL (ref 32–36)
MCV RBC AUTO: 99 FL (ref 82–98)
MICROSCOPIC COMMENT: ABNORMAL
MONOCYTES # BLD AUTO: 1.4 K/UL (ref 0.3–1)
MONOCYTES NFR BLD: 6.9 % (ref 4–15)
NEUTROPHILS # BLD AUTO: 15.8 K/UL (ref 1.8–7.7)
NEUTROPHILS NFR BLD: 79.5 % (ref 38–73)
NITRITE UR QL STRIP: NEGATIVE
NRBC BLD-RTO: 0 /100 WBC
PH UR STRIP: 6 [PH] (ref 5–8)
PLATELET # BLD AUTO: 253 K/UL (ref 150–450)
PMV BLD AUTO: 8.9 FL (ref 9.2–12.9)
POTASSIUM SERPL-SCNC: 4.4 MMOL/L (ref 3.5–5.1)
PROT SERPL-MCNC: 8.1 G/DL (ref 6–8.4)
PROT UR QL STRIP: ABNORMAL
RBC # BLD AUTO: 3.5 M/UL (ref 4.6–6.2)
RBC #/AREA URNS HPF: 3 /HPF (ref 0–4)
SODIUM SERPL-SCNC: 135 MMOL/L (ref 136–145)
SP GR UR STRIP: >1.03 (ref 1–1.03)
SQUAMOUS #/AREA URNS HPF: 0 /HPF
URN SPEC COLLECT METH UR: ABNORMAL
UROBILINOGEN UR STRIP-ACNC: NEGATIVE EU/DL
WBC # BLD AUTO: 19.81 K/UL (ref 3.9–12.7)
WBC #/AREA URNS HPF: 14 /HPF (ref 0–5)

## 2024-03-21 PROCEDURE — 80053 COMPREHEN METABOLIC PANEL: CPT | Performed by: NURSE PRACTITIONER

## 2024-03-21 PROCEDURE — 99284 EMERGENCY DEPT VISIT MOD MDM: CPT | Mod: 25

## 2024-03-21 PROCEDURE — 85025 COMPLETE CBC W/AUTO DIFF WBC: CPT | Performed by: NURSE PRACTITIONER

## 2024-03-21 PROCEDURE — 87086 URINE CULTURE/COLONY COUNT: CPT | Performed by: NURSE PRACTITIONER

## 2024-03-21 PROCEDURE — 25000003 PHARM REV CODE 250: Performed by: EMERGENCY MEDICINE

## 2024-03-21 PROCEDURE — 36415 COLL VENOUS BLD VENIPUNCTURE: CPT | Performed by: NURSE PRACTITIONER

## 2024-03-21 PROCEDURE — 81000 URINALYSIS NONAUTO W/SCOPE: CPT | Performed by: NURSE PRACTITIONER

## 2024-03-21 RX ORDER — ACETAMINOPHEN 500 MG
1000 TABLET ORAL
Status: COMPLETED | OUTPATIENT
Start: 2024-03-21 | End: 2024-03-21

## 2024-03-21 RX ORDER — HYDROCODONE BITARTRATE AND ACETAMINOPHEN 5; 325 MG/1; MG/1
1 TABLET ORAL EVERY 6 HOURS PRN
Qty: 8 TABLET | Refills: 0 | Status: SHIPPED | OUTPATIENT
Start: 2024-03-21 | End: 2024-03-21

## 2024-03-21 RX ORDER — LEVOFLOXACIN 500 MG/1
500 TABLET, FILM COATED ORAL DAILY
Qty: 9 TABLET | Refills: 0 | Status: SHIPPED | OUTPATIENT
Start: 2024-03-22 | End: 2024-03-31

## 2024-03-21 RX ORDER — LEVOFLOXACIN 500 MG/1
500 TABLET, FILM COATED ORAL DAILY
Qty: 9 TABLET | Refills: 0 | Status: SHIPPED | OUTPATIENT
Start: 2024-03-22 | End: 2024-03-21

## 2024-03-21 RX ORDER — LEVOFLOXACIN 500 MG/1
500 TABLET, FILM COATED ORAL
Status: COMPLETED | OUTPATIENT
Start: 2024-03-21 | End: 2024-03-21

## 2024-03-21 RX ORDER — HYDROCODONE BITARTRATE AND ACETAMINOPHEN 5; 325 MG/1; MG/1
1 TABLET ORAL EVERY 6 HOURS PRN
Qty: 8 TABLET | Refills: 0 | Status: SHIPPED | OUTPATIENT
Start: 2024-03-21 | End: 2024-03-24

## 2024-03-21 RX ADMIN — LEVOFLOXACIN 500 MG: 500 TABLET, FILM COATED ORAL at 09:03

## 2024-03-21 RX ADMIN — ACETAMINOPHEN 1000 MG: 500 TABLET, COATED ORAL at 08:03

## 2024-03-21 NOTE — FIRST PROVIDER EVALUATION
Emergency Department TeleTriage Encounter Note      CHIEF COMPLAINT    Chief Complaint   Patient presents with    Groin Pain     Patient states groin started hurting yesterday.   Currently being treated for uti with macrodantin.    Patient states most of pain is in left inguinal canal.  Pain worse with walking.        VITAL SIGNS   Initial Vitals [03/21/24 1525]   BP Pulse Resp Temp SpO2   102/62 94 18 98.1 °F (36.7 °C) 98 %      MAP       --            ALLERGIES    Review of patient's allergies indicates:  No Known Allergies    PROVIDER TRIAGE NOTE  This is a teletriage evaluation of a 84 y.o. male presenting to the ED with c/o left groin pain with a 'knot' in the left inguinal area that is new.  Limited physical exam via telehealth: The patient is awake, alert, answering questions appropriately and is not in respiratory distress. Initial orders will be placed and care will be transferred to an alternate provider when patient is roomed for a full evaluation. Any additional orders and the final disposition will be determined by that provider.         ORDERS  Labs Reviewed - No data to display    ED Orders (720h ago, onward)      Start Ordered     Status Ordering Provider    03/21/24 1532 03/21/24 1531  Insert Saline lock IV  Once         Ordered LINDA HERRERA PGracie    03/21/24 1532 03/21/24 1531  CBC auto differential  STAT         Ordered LINDA HERRERA    03/21/24 1532 03/21/24 1531  Comprehensive metabolic panel  STAT         Ordered LINDA HERRERA    03/21/24 1532 03/21/24 1531  Urinalysis, Reflex to Urine Culture Urine, Clean Catch  STAT         Ordered LINDA HERRERA              Virtual Visit Note: The provider triage portion of this emergency department evaluation and documentation was performed via [employer], a HIPAA-compliant telemedicine application, in concert with a tele-presenter in the room. A face to face patient evaluation with one of my colleagues will occur once the patient is placed in an  emergency department room.      DISCLAIMER: This note was prepared with [employer] voice recognition transcription software. Garbled syntax, mangled pronouns, and other bizarre constructions may be attributed to that software system.

## 2024-03-21 NOTE — ED PROVIDER NOTES
Chief complaint:  Groin Pain (Patient states groin started hurting yesterday.   Currently being treated for uti with macrodantin.    Patient states most of pain is in left inguinal canal.  Pain worse with walking. )      HPI:  Evelio Pandya is a 84 y.o. male with hx htn, DM, TIA, CKD presenting with left inguinal pain..  Hospital f/u 3/18/24 (3 days prior) with urinary frequency noted since d/c.  He has been admitted previously for possible TIA with transient resolved dysarthria with further inpatient consideation given to hypertensive encephalopathy.  BP meds added with improved control and continued resolved sx.  10-day course of macrobid rx with urine culture sent no growth to date but not yet finalized.  Patient initially describes his pain as left inguinal and present primarily with certain movements or positions, absent at rest.  He initially denies left testicular pain.  Symptoms have been present for 1 day.  He denies persistent urinary symptoms including persistent frequency.  He denies penile discharge or other lesions.  He denies trauma or new activity.  No recent fever.  No abdominal pain or emesis.  He is eating and drinking well.  No change in bowel movements or blood in the stools.  After physical examination where exquisite tenderness to the left testicle with irregular exam noted, he admits to left testicle pain, reversing his initial story present for the same period of time.  He may be a poor historian.    ROS: As per HPI and below:  No back or flank pain, rash, joint pain, chest pain, dyspnea.    Review of patient's allergies indicates:  No Known Allergies    Current Discharge Medication List        START taking these medications    Details   HYDROcodone-acetaminophen (NORCO) 5-325 mg per tablet Take 1 tablet by mouth every 6 (six) hours as needed for Pain.  Qty: 8 tablet, Refills: 0    Comments: Quantity prescribed more than 7 day supply? No  Associated Diagnoses: Epididymo-orchitis without  "abscess      levoFLOXacin (LEVAQUIN) 500 MG tablet Take 1 tablet (500 mg total) by mouth once daily. for 9 days  Qty: 9 tablet, Refills: 0           CONTINUE these medications which have NOT CHANGED    Details   amLODIPine (NORVASC) 10 MG tablet Take 10 mg by mouth.      aspirin (ECOTRIN) 81 MG EC tablet Take 81 mg by mouth once daily.      atorvastatin (LIPITOR) 80 MG tablet Take 1 tablet (80 mg total) by mouth once daily.  Qty: 90 tablet, Refills: 1      BD ULTRA-FINE SHORT PEN NEEDLE 31 gauge x 5/16" Ndle Insulin injection 4 times a day  Qty: 120 each, Refills: 11      brimonidine 0.2% (ALPHAGAN) 0.2 % Drop Place 1 drop into both eyes every 12 (twelve) hours.  Qty: 30 mL, Refills: 6    Comments: 90 day supply  Associated Diagnoses: Primary open-angle glaucoma, right eye, severe stage; Primary open-angle glaucoma, left eye, moderate stage      clopidogreL (PLAVIX) 75 mg tablet TAKE 1 TABLET(75 MG) BY MOUTH EVERY DAY  Qty: 90 tablet, Refills: 1      dorzolamide-timolol 2-0.5% (COSOPT) 22.3-6.8 mg/mL ophthalmic solution Place 1 drop into both eyes every 12 (twelve) hours.  Qty: 30 mL, Refills: 6    Comments: 90 day supply  Associated Diagnoses: Primary open-angle glaucoma, right eye, severe stage; Primary open-angle glaucoma, left eye, moderate stage      gabapentin (NEURONTIN) 300 MG capsule Take 300 mg by mouth every evening.      hydrALAZINE (APRESOLINE) 50 MG tablet Take 1 tablet (50 mg total) by mouth every 8 (eight) hours as needed (for SBP > 160).  Qty: 270 tablet, Refills: 0    Comments: .      insulin detemir U-100, Levemir, (LEVEMIR FLEXPEN) 100 unit/mL (3 mL) InPn pen Inject 15 Units into the skin every evening.  Qty: 15 mL, Refills: 2    Associated Diagnoses: Type 2 diabetes mellitus with hyperglycemia, with long-term current use of insulin      losartan (COZAAR) 50 MG tablet Take 1 tablet (50 mg total) by mouth once daily.  Qty: 90 tablet, Refills: 0    Comments: .      NOVOLOG FLEXPEN U-100 INSULIN 100 " unit/mL (3 mL) InPn pen SMARTSI Unit(s) SUB-Q 3 Times Daily  Qty: 15 mL, Refills: 11    Associated Diagnoses: Type 2 diabetes mellitus with hyperglycemia, with long-term current use of insulin      semaglutide (OZEMPIC) 0.25 mg or 0.5 mg (2 mg/3 mL) pen injector Inject 0.25 mg into the skin every 7 days for 14 days, THEN 0.5 mg every 7 days for 21 days.  Qty: 3 mL, Refills: 0    Associated Diagnoses: Type 2 diabetes mellitus with hyperglycemia, with long-term current use of insulin      tamsulosin (FLOMAX) 0.4 mg Cap Take 1 capsule (0.4 mg total) by mouth every evening.  Qty: 90 capsule, Refills: 3      travoprost (TRAVATAN Z) 0.004 % ophthalmic solution Place 1 drop into both eyes every evening.  Qty: 7.5 mL, Refills: 6    Comments: 90 day supply  Associated Diagnoses: Primary open-angle glaucoma, right eye, severe stage; Primary open-angle glaucoma, left eye, moderate stage      TRUE METRIX GLUCOSE TEST STRIP Strp 3 (three) times daily. Use to test blood glucose  Qty: 100 each, Refills: 11    Comments: 3 (three) times daily. Use to test blood glucose      TRUEPLUS LANCETS 33 gauge Misc              PMH:  As per HPI and below:  Past Medical History:   Diagnosis Date    Diabetes mellitus     Hypertension     TIA (transient ischemic attack)     Urinary retention      Past Surgical History:   Procedure Laterality Date    CATARACT EXTRACTION Right     GLAUCOMA SURGERY Right        Social History     Socioeconomic History    Marital status:    Tobacco Use    Smoking status: Never    Smokeless tobacco: Never   Substance and Sexual Activity    Alcohol use: Not Currently    Sexual activity: Not Currently     Social Determinants of Health     Financial Resource Strain: Low Risk  (3/7/2024)    Overall Financial Resource Strain (CARDIA)     Difficulty of Paying Living Expenses: Not hard at all   Food Insecurity: No Food Insecurity (3/7/2024)    Hunger Vital Sign     Worried About Running Out of Food in the Last  Year: Never true     Ran Out of Food in the Last Year: Never true   Transportation Needs: No Transportation Needs (3/7/2024)    PRAPARE - Transportation     Lack of Transportation (Medical): No     Lack of Transportation (Non-Medical): No   Physical Activity: Sufficiently Active (3/7/2024)    Exercise Vital Sign     Days of Exercise per Week: 3 days     Minutes of Exercise per Session: 60 min   Stress: No Stress Concern Present (3/7/2024)    [employer] - Occupational Stress Questionnaire     Feeling of Stress : Not at all   Social Connections: Moderately Integrated (3/7/2024)    Social Connection and Isolation Panel [NHANES]     Frequency of Communication with Friends and Family: More than three times a week     Frequency of Social Gatherings with Friends and Family: More than three times a week     Attends Taoism Services: More than 4 times per year     Active Member of Clubs or Organizations: Yes     Attends Club or Organization Meetings: Never     Marital Status:    Housing Stability: Unknown (3/7/2024)    Housing Stability Vital Sign     Unable to Pay for Housing in the Last Year: No     Unstable Housing in the Last Year: No       Family History   Family history unknown: Yes       Physical Exam:    Vitals:    03/21/24 2133   BP: 116/69   Pulse: 96   Resp:    Temp:      GENERAL:  No apparent distress.  Alert.    HEENT:  Moist mucous membranes.  Normocephalic and atraumatic.    NECK:  No swelling.  Midline trachea.   CARDIOVASCULAR:  Regular rate and rhythm.  2+ radial pulses.    PULMONARY:  Scattered rhonchi towards the bases.  No rales or wheezes.  No tachypnea.  ABDOMEN:  Non-tender and non-distended.  No palpable masses or hernias including in the inguinal region examined supine and standing.  The patient has firm, tender, irregular left testicle with abnormal lie.  I am unable to elicit cremasteric reflex on that side.  Right testicle nontender to palpation.  No medial  thigh tenderness to palpation or definitive palpable hernia.  EXTREMITIES:  Warm and well perfused.  Brisk capillary refill.  Trace pitting pedal edema.  NEUROLOGICAL:  Normal mental status.  Appropriate and conversant.  Normal gait.  SKIN:  No rashes or ecchymoses.    BACK:  Atraumatic.  No CVA tenderness to palpation.      Labs Reviewed   CBC W/ AUTO DIFFERENTIAL - Abnormal; Notable for the following components:       Result Value    WBC 19.81 (*)     RBC 3.50 (*)     Hemoglobin 11.4 (*)     Hematocrit 34.7 (*)     MCV 99 (*)     MCH 32.6 (*)     RDW 11.4 (*)     MPV 8.9 (*)     Immature Granulocytes 1.4 (*)     Gran # (ANC) 15.8 (*)     Immature Grans (Abs) 0.28 (*)     Mono # 1.4 (*)     Gran % 79.5 (*)     Lymph % 10.3 (*)     All other components within normal limits   COMPREHENSIVE METABOLIC PANEL - Abnormal; Notable for the following components:    Sodium 135 (*)     Glucose 175 (*)     Creatinine 1.5 (*)     Albumin 3.3 (*)     Total Bilirubin 2.3 (*)     eGFR 46 (*)     Anion Gap 7 (*)     All other components within normal limits   URINALYSIS, REFLEX TO URINE CULTURE - Abnormal; Notable for the following components:    Color, UA Orange (*)     Appearance, UA Hazy (*)     Specific Gravity, UA >1.030 (*)     Protein, UA 1+ (*)     Leukocytes, UA 1+ (*)     All other components within normal limits    Narrative:     Specimen Source->Urine   URINALYSIS MICROSCOPIC - Abnormal; Notable for the following components:    WBC, UA 14 (*)     Hyaline Casts, UA 10 (*)     Granular Casts, UA 1 (*)     All other components within normal limits    Narrative:     Specimen Source->Urine   CULTURE, URINE       Current Discharge Medication List        START taking these medications    Details   HYDROcodone-acetaminophen (NORCO) 5-325 mg per tablet Take 1 tablet by mouth every 6 (six) hours as needed for Pain.  Qty: 8 tablet, Refills: 0    Comments: Quantity prescribed more than 7 day supply? No  Associated Diagnoses:  "Epididymo-orchitis without abscess      levoFLOXacin (LEVAQUIN) 500 MG tablet Take 1 tablet (500 mg total) by mouth once daily. for 9 days  Qty: 9 tablet, Refills: 0           CONTINUE these medications which have NOT CHANGED    Details   amLODIPine (NORVASC) 10 MG tablet Take 10 mg by mouth.      aspirin (ECOTRIN) 81 MG EC tablet Take 81 mg by mouth once daily.      atorvastatin (LIPITOR) 80 MG tablet Take 1 tablet (80 mg total) by mouth once daily.  Qty: 90 tablet, Refills: 1      BD ULTRA-FINE SHORT PEN NEEDLE 31 gauge x 5/16" Ndle Insulin injection 4 times a day  Qty: 120 each, Refills: 11      brimonidine 0.2% (ALPHAGAN) 0.2 % Drop Place 1 drop into both eyes every 12 (twelve) hours.  Qty: 30 mL, Refills: 6    Comments: 90 day supply  Associated Diagnoses: Primary open-angle glaucoma, right eye, severe stage; Primary open-angle glaucoma, left eye, moderate stage      clopidogreL (PLAVIX) 75 mg tablet TAKE 1 TABLET(75 MG) BY MOUTH EVERY DAY  Qty: 90 tablet, Refills: 1      dorzolamide-timolol 2-0.5% (COSOPT) 22.3-6.8 mg/mL ophthalmic solution Place 1 drop into both eyes every 12 (twelve) hours.  Qty: 30 mL, Refills: 6    Comments: 90 day supply  Associated Diagnoses: Primary open-angle glaucoma, right eye, severe stage; Primary open-angle glaucoma, left eye, moderate stage      gabapentin (NEURONTIN) 300 MG capsule Take 300 mg by mouth every evening.      hydrALAZINE (APRESOLINE) 50 MG tablet Take 1 tablet (50 mg total) by mouth every 8 (eight) hours as needed (for SBP > 160).  Qty: 270 tablet, Refills: 0    Comments: .      insulin detemir U-100, Levemir, (LEVEMIR FLEXPEN) 100 unit/mL (3 mL) InPn pen Inject 15 Units into the skin every evening.  Qty: 15 mL, Refills: 2    Associated Diagnoses: Type 2 diabetes mellitus with hyperglycemia, with long-term current use of insulin      losartan (COZAAR) 50 MG tablet Take 1 tablet (50 mg total) by mouth once daily.  Qty: 90 tablet, Refills: 0    Comments: .    "   NOVOLOG FLEXPEN U-100 INSULIN 100 unit/mL (3 mL) InPn pen SMARTSI Unit(s) SUB-Q 3 Times Daily  Qty: 15 mL, Refills: 11    Associated Diagnoses: Type 2 diabetes mellitus with hyperglycemia, with long-term current use of insulin      semaglutide (OZEMPIC) 0.25 mg or 0.5 mg (2 mg/3 mL) pen injector Inject 0.25 mg into the skin every 7 days for 14 days, THEN 0.5 mg every 7 days for 21 days.  Qty: 3 mL, Refills: 0    Associated Diagnoses: Type 2 diabetes mellitus with hyperglycemia, with long-term current use of insulin      tamsulosin (FLOMAX) 0.4 mg Cap Take 1 capsule (0.4 mg total) by mouth every evening.  Qty: 90 capsule, Refills: 3      travoprost (TRAVATAN Z) 0.004 % ophthalmic solution Place 1 drop into both eyes every evening.  Qty: 7.5 mL, Refills: 6    Comments: 90 day supply  Associated Diagnoses: Primary open-angle glaucoma, right eye, severe stage; Primary open-angle glaucoma, left eye, moderate stage      TRUE METRIX GLUCOSE TEST STRIP Strp 3 (three) times daily. Use to test blood glucose  Qty: 100 each, Refills: 11    Comments: 3 (three) times daily. Use to test blood glucose      TRUEPLUS LANCETS 33 gauge Misc              Orders Placed This Encounter   Procedures    Urine culture    US Scrotum And Testicles    CBC auto differential    Comprehensive metabolic panel    Urinalysis, Reflex to Urine Culture Urine, Clean Catch    Urinalysis Microscopic    Insert Saline lock IV       Imaging Results              US Scrotum And Testicles (In process)                     ED Course as of 24   Thu Mar 21, 2024   2019 US scrotum:    1. Left epididymo-orchitis   2. Small right testicular cysts as described.  (Rad read)    The patient was informed of the incidental finding(s) as well as the need for PCP or specialist follow-up for reevaluation and possible further investigation or monitoring.     [MR]      ED Course User Index  [MR] Fernando Mckeon MD       MDM:    84 y.o. male with 1 day  history of left inguinal pain with exam showing more concerning left testicle pain with mass of uncertain etiology.  Ultrasound ordered to differentiate between potential hernia, mass, abscess among other possibilities.  Missed torsion with delayed presentation also possible.  Labs protocol from triage show leukocytosis of uncertain etiology.  Other labs show mild elevation of total bilirubin, although patient has no other GI symptoms at this point.  Minimal positive markers in urine with patient currently on antibiotics for UTI.  Unlikely obstructive uropathy.      Ultrasound shows left epididymo-orchitis consistent with leukocytosis at patient's symptoms.  On reassessment he directly localizes pain here and not higher in inguinal area or elsewhere.  There is no hernia or sign of incarcerated hernia.  I do not think further abdominal imaging is indicated.  Laboratories show renal function close to baseline with adequate fluid intake at home recommended.  I do not think he requires admission for serial repeat or IV fluids.  He is tolerating fluids well by mouth.  Mild nonspecific elevated bilirubin noted with low suspicion for biliary obstructive process.  There is no evidence of torsion.  There is no sign of abscess.  I do not think he requires admission for IV antibiotics.  I doubt sepsis.  I will change current antibiotic from nitrofurantoin to levofloxacin pending close outpatient neurology follow-up.  Short course of analgesia as necessary also prescribed.  Detailed return precautions reviewed.    Diagnoses:    1. L epididymo-orchitis       Fernando Mckeon MD  03/21/24 2139

## 2024-03-24 LAB — BACTERIA UR CULT: NO GROWTH

## 2024-03-26 ENCOUNTER — LAB VISIT (OUTPATIENT)
Dept: LAB | Facility: HOSPITAL | Age: 84
End: 2024-03-26
Attending: FAMILY MEDICINE
Payer: MEDICARE

## 2024-03-26 ENCOUNTER — OFFICE VISIT (OUTPATIENT)
Dept: FAMILY MEDICINE | Facility: CLINIC | Age: 84
End: 2024-03-26
Payer: MEDICARE

## 2024-03-26 VITALS
SYSTOLIC BLOOD PRESSURE: 132 MMHG | HEART RATE: 88 BPM | DIASTOLIC BLOOD PRESSURE: 74 MMHG | WEIGHT: 190.94 LBS | TEMPERATURE: 98 F | OXYGEN SATURATION: 99 % | HEIGHT: 66 IN | BODY MASS INDEX: 30.69 KG/M2

## 2024-03-26 DIAGNOSIS — N45.3 ORCHITIS, EPIDIDYMITIS, AND EPIDIDYMO-ORCHITIS: Primary | ICD-10-CM

## 2024-03-26 DIAGNOSIS — E11.65 TYPE 2 DIABETES MELLITUS WITH HYPERGLYCEMIA, WITH LONG-TERM CURRENT USE OF INSULIN: ICD-10-CM

## 2024-03-26 DIAGNOSIS — Z79.4 TYPE 2 DIABETES MELLITUS WITH HYPERGLYCEMIA, WITH LONG-TERM CURRENT USE OF INSULIN: ICD-10-CM

## 2024-03-26 DIAGNOSIS — N45.2 ORCHITIS, EPIDIDYMITIS, AND EPIDIDYMO-ORCHITIS: Primary | ICD-10-CM

## 2024-03-26 DIAGNOSIS — N45.3 ORCHITIS, EPIDIDYMITIS, AND EPIDIDYMO-ORCHITIS: ICD-10-CM

## 2024-03-26 DIAGNOSIS — N45.1 ORCHITIS, EPIDIDYMITIS, AND EPIDIDYMO-ORCHITIS: ICD-10-CM

## 2024-03-26 DIAGNOSIS — N45.2 ORCHITIS, EPIDIDYMITIS, AND EPIDIDYMO-ORCHITIS: ICD-10-CM

## 2024-03-26 DIAGNOSIS — N45.1 ORCHITIS, EPIDIDYMITIS, AND EPIDIDYMO-ORCHITIS: Primary | ICD-10-CM

## 2024-03-26 LAB
ALBUMIN SERPL BCP-MCNC: 3.2 G/DL (ref 3.5–5.2)
ALP SERPL-CCNC: 104 U/L (ref 55–135)
ALT SERPL W/O P-5'-P-CCNC: 48 U/L (ref 10–44)
ANION GAP SERPL CALC-SCNC: 8 MMOL/L (ref 8–16)
AST SERPL-CCNC: 29 U/L (ref 10–40)
BASOPHILS # BLD AUTO: 0.06 K/UL (ref 0–0.2)
BASOPHILS NFR BLD: 0.5 % (ref 0–1.9)
BILIRUB SERPL-MCNC: 0.5 MG/DL (ref 0.1–1)
BUN SERPL-MCNC: 19 MG/DL (ref 8–23)
CALCIUM SERPL-MCNC: 9.4 MG/DL (ref 8.7–10.5)
CHLORIDE SERPL-SCNC: 105 MMOL/L (ref 95–110)
CO2 SERPL-SCNC: 24 MMOL/L (ref 23–29)
CREAT SERPL-MCNC: 1.3 MG/DL (ref 0.5–1.4)
DIFFERENTIAL METHOD BLD: ABNORMAL
EOSINOPHIL # BLD AUTO: 0.3 K/UL (ref 0–0.5)
EOSINOPHIL NFR BLD: 2.9 % (ref 0–8)
ERYTHROCYTE [DISTWIDTH] IN BLOOD BY AUTOMATED COUNT: 11.6 % (ref 11.5–14.5)
EST. GFR  (NO RACE VARIABLE): 54.8 ML/MIN/1.73 M^2
ESTIMATED AVG GLUCOSE: 137 MG/DL (ref 68–131)
GLUCOSE SERPL-MCNC: 173 MG/DL (ref 70–110)
HBA1C MFR BLD: 6.4 % (ref 4–5.6)
HCT VFR BLD AUTO: 35 % (ref 40–54)
HGB BLD-MCNC: 10.9 G/DL (ref 14–18)
IMM GRANULOCYTES # BLD AUTO: 0.46 K/UL (ref 0–0.04)
IMM GRANULOCYTES NFR BLD AUTO: 4.2 % (ref 0–0.5)
LYMPHOCYTES # BLD AUTO: 2.3 K/UL (ref 1–4.8)
LYMPHOCYTES NFR BLD: 21.2 % (ref 18–48)
MCH RBC QN AUTO: 32 PG (ref 27–31)
MCHC RBC AUTO-ENTMCNC: 31.1 G/DL (ref 32–36)
MCV RBC AUTO: 103 FL (ref 82–98)
MONOCYTES # BLD AUTO: 0.6 K/UL (ref 0.3–1)
MONOCYTES NFR BLD: 5.6 % (ref 4–15)
NEUTROPHILS # BLD AUTO: 7.2 K/UL (ref 1.8–7.7)
NEUTROPHILS NFR BLD: 65.6 % (ref 38–73)
NRBC BLD-RTO: 0 /100 WBC
PLATELET # BLD AUTO: 384 K/UL (ref 150–450)
PMV BLD AUTO: 9.1 FL (ref 9.2–12.9)
POTASSIUM SERPL-SCNC: 4.4 MMOL/L (ref 3.5–5.1)
PROT SERPL-MCNC: 8 G/DL (ref 6–8.4)
RBC # BLD AUTO: 3.41 M/UL (ref 4.6–6.2)
SODIUM SERPL-SCNC: 137 MMOL/L (ref 136–145)
WBC # BLD AUTO: 10.92 K/UL (ref 3.9–12.7)

## 2024-03-26 PROCEDURE — 1111F DSCHRG MED/CURRENT MED MERGE: CPT | Mod: CPTII,S$GLB,, | Performed by: STUDENT IN AN ORGANIZED HEALTH CARE EDUCATION/TRAINING PROGRAM

## 2024-03-26 PROCEDURE — 99999 PR PBB SHADOW E&M-EST. PATIENT-LVL V: CPT | Mod: PBBFAC,,, | Performed by: STUDENT IN AN ORGANIZED HEALTH CARE EDUCATION/TRAINING PROGRAM

## 2024-03-26 PROCEDURE — 1159F MED LIST DOCD IN RCRD: CPT | Mod: CPTII,S$GLB,, | Performed by: STUDENT IN AN ORGANIZED HEALTH CARE EDUCATION/TRAINING PROGRAM

## 2024-03-26 PROCEDURE — 3078F DIAST BP <80 MM HG: CPT | Mod: CPTII,S$GLB,, | Performed by: STUDENT IN AN ORGANIZED HEALTH CARE EDUCATION/TRAINING PROGRAM

## 2024-03-26 PROCEDURE — 85025 COMPLETE CBC W/AUTO DIFF WBC: CPT | Performed by: STUDENT IN AN ORGANIZED HEALTH CARE EDUCATION/TRAINING PROGRAM

## 2024-03-26 PROCEDURE — 1125F AMNT PAIN NOTED PAIN PRSNT: CPT | Mod: CPTII,S$GLB,, | Performed by: STUDENT IN AN ORGANIZED HEALTH CARE EDUCATION/TRAINING PROGRAM

## 2024-03-26 PROCEDURE — 83036 HEMOGLOBIN GLYCOSYLATED A1C: CPT | Performed by: FAMILY MEDICINE

## 2024-03-26 PROCEDURE — 99214 OFFICE O/P EST MOD 30 MIN: CPT | Mod: S$GLB,,, | Performed by: STUDENT IN AN ORGANIZED HEALTH CARE EDUCATION/TRAINING PROGRAM

## 2024-03-26 PROCEDURE — 36415 COLL VENOUS BLD VENIPUNCTURE: CPT | Mod: PO | Performed by: STUDENT IN AN ORGANIZED HEALTH CARE EDUCATION/TRAINING PROGRAM

## 2024-03-26 PROCEDURE — 3072F LOW RISK FOR RETINOPATHY: CPT | Mod: CPTII,S$GLB,, | Performed by: STUDENT IN AN ORGANIZED HEALTH CARE EDUCATION/TRAINING PROGRAM

## 2024-03-26 PROCEDURE — 3288F FALL RISK ASSESSMENT DOCD: CPT | Mod: CPTII,S$GLB,, | Performed by: STUDENT IN AN ORGANIZED HEALTH CARE EDUCATION/TRAINING PROGRAM

## 2024-03-26 PROCEDURE — 3075F SYST BP GE 130 - 139MM HG: CPT | Mod: CPTII,S$GLB,, | Performed by: STUDENT IN AN ORGANIZED HEALTH CARE EDUCATION/TRAINING PROGRAM

## 2024-03-26 PROCEDURE — 1160F RVW MEDS BY RX/DR IN RCRD: CPT | Mod: CPTII,S$GLB,, | Performed by: STUDENT IN AN ORGANIZED HEALTH CARE EDUCATION/TRAINING PROGRAM

## 2024-03-26 PROCEDURE — 1101F PT FALLS ASSESS-DOCD LE1/YR: CPT | Mod: CPTII,S$GLB,, | Performed by: STUDENT IN AN ORGANIZED HEALTH CARE EDUCATION/TRAINING PROGRAM

## 2024-03-26 PROCEDURE — 80053 COMPREHEN METABOLIC PANEL: CPT | Performed by: STUDENT IN AN ORGANIZED HEALTH CARE EDUCATION/TRAINING PROGRAM

## 2024-03-26 RX ORDER — HYDROCODONE BITARTRATE AND ACETAMINOPHEN 5; 325 MG/1; MG/1
1 TABLET ORAL EVERY 6 HOURS PRN
COMMUNITY
End: 2024-03-26 | Stop reason: SDUPTHER

## 2024-03-26 RX ORDER — HYDROCODONE BITARTRATE AND ACETAMINOPHEN 5; 325 MG/1; MG/1
1 TABLET ORAL EVERY 6 HOURS PRN
Qty: 28 TABLET | Refills: 0 | Status: SHIPPED | OUTPATIENT
Start: 2024-03-26 | End: 2024-04-02

## 2024-03-26 NOTE — PROGRESS NOTES
SUBJECTIVE:    CHIEF COMPLAINT:   Chief Complaint   Patient presents with    Hospital Follow Up     Groin pain           274}    HISTORY OF PRESENT ILLNESS:  Evelio Pandya is a 84 y.o. male with hx htn, DM, TIA, CKD presenting with left inguinal pain.  He is status post hospital DC on 03/21/2024.  He was diagnosed and treated for epididymal orchitis, with medications switch from Macrobid to Levaquin.  He reports that swelling has somewhat improved, but he still continues to have swelling. He has not been seen by Urology yet for further evaluation.    Transitional Care Note    Family and/or Caretaker present at visit?  No.  Diagnostic tests reviewed/disposition: I have reviewed all completed as well as pending diagnostic tests at the time of discharge.  Disease/illness education:  Discussed nature of disease process, recommended diligent hygiene practices with regular changing of wash cloths daily.  Home health/community services discussion/referrals: Patient does not have home health established from hospital visit.  They do not need home health.  If needed, we will set up home health for the patient.   Establishment or re-establishment of referral orders for community resources: No other necessary community resources.   Discussion with other health care providers:  We will make referral to Urology for further follow-up     PAST MEDICAL HISTORY:     274}  Past Medical History:   Diagnosis Date    Diabetes mellitus     Hypertension     TIA (transient ischemic attack)     Urinary retention        PAST SURGICAL HISTORY:  Past Surgical History:   Procedure Laterality Date    CATARACT EXTRACTION Right     GLAUCOMA SURGERY Right        SOCIAL HISTORY:  Social History     Socioeconomic History    Marital status:    Tobacco Use    Smoking status: Never    Smokeless tobacco: Never   Substance and Sexual Activity    Alcohol use: Not Currently    Sexual activity: Not Currently     Social Determinants of Health      Financial Resource Strain: Low Risk  (3/7/2024)    Overall Financial Resource Strain (CARDIA)     Difficulty of Paying Living Expenses: Not hard at all   Food Insecurity: No Food Insecurity (3/7/2024)    Hunger Vital Sign     Worried About Running Out of Food in the Last Year: Never true     Ran Out of Food in the Last Year: Never true   Transportation Needs: No Transportation Needs (3/7/2024)    PRAPARE - Transportation     Lack of Transportation (Medical): No     Lack of Transportation (Non-Medical): No   Physical Activity: Sufficiently Active (3/7/2024)    Exercise Vital Sign     Days of Exercise per Week: 3 days     Minutes of Exercise per Session: 60 min   Stress: No Stress Concern Present (3/7/2024)    [employer] - Occupational Stress Questionnaire     Feeling of Stress : Not at all   Social Connections: Moderately Integrated (3/7/2024)    Social Connection and Isolation Panel [NHANES]     Frequency of Communication with Friends and Family: More than three times a week     Frequency of Social Gatherings with Friends and Family: More than three times a week     Attends [employer] Services: More than 4 times per year     Active Member of Clubs or Organizations: Yes     Attends Club or Organization Meetings: Never     Marital Status:    Housing Stability: Unknown (3/7/2024)    Housing Stability Vital Sign     Unable to Pay for Housing in the Last Year: No     Unstable Housing in the Last Year: No       FAMILY HISTORY:       Family History   Family history unknown: Yes       ALLERGIES AND MEDICATIONS: updated and reviewed.      274}  Review of patient's allergies indicates:  No Known Allergies  Medication List with Changes/Refills   Current Medications    AMLODIPINE (NORVASC) 10 MG TABLET    Take 10 mg by mouth.    ASPIRIN (ECOTRIN) 81 MG EC TABLET    Take 81 mg by mouth once daily.    ATORVASTATIN (LIPITOR) 80 MG TABLET    Take 1 tablet (80 mg total) by mouth once daily.     "BD ULTRA-FINE SHORT PEN NEEDLE 31 GAUGE X 5/16" NDLE    Insulin injection 4 times a day    BRIMONIDINE 0.2% (ALPHAGAN) 0.2 % DROP    Place 1 drop into both eyes every 12 (twelve) hours.    CLOPIDOGREL (PLAVIX) 75 MG TABLET    TAKE 1 TABLET(75 MG) BY MOUTH EVERY DAY    DORZOLAMIDE-TIMOLOL 2-0.5% (COSOPT) 22.3-6.8 MG/ML OPHTHALMIC SOLUTION    Place 1 drop into both eyes every 12 (twelve) hours.    GABAPENTIN (NEURONTIN) 300 MG CAPSULE    Take 300 mg by mouth every evening.    HYDRALAZINE (APRESOLINE) 50 MG TABLET    Take 1 tablet (50 mg total) by mouth every 8 (eight) hours as needed (for SBP > 160).    INSULIN DETEMIR U-100, LEVEMIR, (LEVEMIR FLEXPEN) 100 UNIT/ML (3 ML) INPN PEN    Inject 15 Units into the skin every evening.    LEVOFLOXACIN (LEVAQUIN) 500 MG TABLET    Take 1 tablet (500 mg total) by mouth once daily. for 9 days    LOSARTAN (COZAAR) 50 MG TABLET    Take 1 tablet (50 mg total) by mouth once daily.    NOVOLOG FLEXPEN U-100 INSULIN 100 UNIT/ML (3 ML) INPN PEN    SMARTSI Unit(s) SUB-Q 3 Times Daily    SEMAGLUTIDE (OZEMPIC) 0.25 MG OR 0.5 MG (2 MG/3 ML) PEN INJECTOR    Inject 0.25 mg into the skin every 7 days for 14 days, THEN 0.5 mg every 7 days for 21 days.    TAMSULOSIN (FLOMAX) 0.4 MG CAP    Take 1 capsule (0.4 mg total) by mouth every evening.    TRAVOPROST (TRAVATAN Z) 0.004 % OPHTHALMIC SOLUTION    Place 1 drop into both eyes every evening.    TRUE METRIX GLUCOSE TEST STRIP STRP    3 (three) times daily. Use to test blood glucose    TRUEPLUS LANCETS 33 GAUGE MISC       Changed and/or Refilled Medications    Modified Medication Previous Medication    HYDROCODONE-ACETAMINOPHEN (NORCO) 5-325 MG PER TABLET HYDROcodone-acetaminophen (NORCO) 5-325 mg per tablet       Take 1 tablet by mouth every 6 (six) hours as needed for Pain. For Groin Pain    Take 1 tablet by mouth every 6 (six) hours as needed for Pain.       SCREENING HISTORY:    274}  Health Maintenance         Date Due Completion Date    " "Pneumococcal Vaccines (Age 65+) (1 of 2 - PCV) Never done ---    TETANUS VACCINE Never done ---    Shingles Vaccine (1 of 2) Never done ---    RSV Vaccine (Age 60+ and Pregnant patients) (1 - 1-dose 60+ series) Never done ---    Influenza Vaccine (1) 09/01/2023 9/28/2022    COVID-19 Vaccine (3 - 2023-24 season) 09/01/2023 3/4/2021    Diabetes Urine Screening 12/30/2023 12/30/2022    Eye Exam 03/21/2024 3/21/2023    Hemoglobin A1c 09/26/2024 3/26/2024    Lipid Panel 03/06/2025 3/6/2024    Aspirin/Antiplatelet Therapy 03/26/2025 3/26/2024            REVIEW OF SYSTEMS:   Review of Systems   All other systems reviewed and are negative.      PHYSICAL EXAM:      274}  /74 (BP Location: Right arm, Patient Position: Sitting, BP Method: Large (Manual))   Pulse 88   Temp 97.7 °F (36.5 °C)   Ht 5' 6" (1.676 m)   Wt 86.6 kg (190 lb 14.7 oz)   SpO2 99%   BMI 30.81 kg/m²   Wt Readings from Last 3 Encounters:   03/26/24 86.6 kg (190 lb 14.7 oz)   03/21/24 89.5 kg (197 lb 5 oz)   03/18/24 89.5 kg (197 lb 5 oz)     BP Readings from Last 3 Encounters:   03/26/24 132/74   03/21/24 116/69   03/18/24 130/78     Estimated body mass index is 30.81 kg/m² as calculated from the following:    Height as of this encounter: 5' 6" (1.676 m).    Weight as of this encounter: 86.6 kg (190 lb 14.7 oz).     Physical Exam  Exam conducted with a chaperone present.   Constitutional:       Appearance: Normal appearance.   HENT:      Head: Normocephalic and atraumatic.      Right Ear: External ear normal.      Left Ear: External ear normal.   Eyes:      Extraocular Movements: Extraocular movements intact.      Pupils: Pupils are equal, round, and reactive to light.   Cardiovascular:      Rate and Rhythm: Normal rate and regular rhythm.   Abdominal:      General: There is no distension.      Palpations: Abdomen is soft. There is no mass.   Genitourinary:     Testes:         Left: Tenderness and swelling present.          Comments: Tenderness " and swelling of left testicle.   Musculoskeletal:      Cervical back: Normal range of motion.   Neurological:      Mental Status: He is alert.         LABS:   274}  I have reviewed old labs below:  Lab Results   Component Value Date    WBC 10.92 03/26/2024    HGB 10.9 (L) 03/26/2024    HCT 35.0 (L) 03/26/2024     (H) 03/26/2024     03/26/2024     03/26/2024    K 4.4 03/26/2024     03/26/2024    CALCIUM 9.4 03/26/2024    CO2 24 03/26/2024     (H) 03/26/2024    BUN 19 03/26/2024    CREATININE 1.3 03/26/2024    ANIONGAP 8 03/26/2024    PROT 8.0 03/26/2024    ALBUMIN 3.2 (L) 03/26/2024    BILITOT 0.5 03/26/2024    ALKPHOS 104 03/26/2024    ALT 48 (H) 03/26/2024    AST 29 03/26/2024    INR 1.1 03/06/2024    CHOL 112 (L) 03/06/2024    TRIG 88 03/06/2024    HDL 39 (L) 03/06/2024    LDLCALC 55.4 (L) 03/06/2024    TSH 1.360 03/06/2024    HGBA1C 6.4 (H) 03/26/2024       ASSESSMENT AND PLAN:  274}  1. Orchitis, epididymitis, and epididymo-orchitis  -     CBC W/ AUTO DIFFERENTIAL; Future; Expected date: 03/26/2024  -     COMPREHENSIVE METABOLIC PANEL; Future; Expected date: 03/26/2024  -     Ambulatory referral/consult to Urology; Future; Expected date: 04/02/2024  -     HYDROcodone-acetaminophen (NORCO) 5-325 mg per tablet; Take 1 tablet by mouth every 6 (six) hours as needed for Pain. For Groin Pain  Dispense: 28 tablet; Refill: 0           Orders Placed This Encounter   Procedures    CBC W/ AUTO DIFFERENTIAL    COMPREHENSIVE METABOLIC PANEL    Ambulatory referral/consult to Urology       Follow up in about 4 weeks (around 4/23/2024). or sooner as needed.

## 2024-03-27 ENCOUNTER — OFFICE VISIT (OUTPATIENT)
Dept: UROLOGY | Facility: CLINIC | Age: 84
End: 2024-03-27
Payer: MEDICARE

## 2024-03-27 VITALS — BODY MASS INDEX: 30.69 KG/M2 | HEIGHT: 66 IN | WEIGHT: 190.94 LBS

## 2024-03-27 DIAGNOSIS — N45.3 EPIDIDYMO-ORCHITIS: ICD-10-CM

## 2024-03-27 PROCEDURE — 99999 PR PBB SHADOW E&M-EST. PATIENT-LVL V: CPT | Mod: PBBFAC,,, | Performed by: NURSE PRACTITIONER

## 2024-03-27 PROCEDURE — 1125F AMNT PAIN NOTED PAIN PRSNT: CPT | Mod: CPTII,S$GLB,, | Performed by: NURSE PRACTITIONER

## 2024-03-27 PROCEDURE — 1111F DSCHRG MED/CURRENT MED MERGE: CPT | Mod: CPTII,S$GLB,, | Performed by: NURSE PRACTITIONER

## 2024-03-27 PROCEDURE — 3288F FALL RISK ASSESSMENT DOCD: CPT | Mod: CPTII,S$GLB,, | Performed by: NURSE PRACTITIONER

## 2024-03-27 PROCEDURE — 1101F PT FALLS ASSESS-DOCD LE1/YR: CPT | Mod: CPTII,S$GLB,, | Performed by: NURSE PRACTITIONER

## 2024-03-27 PROCEDURE — 3072F LOW RISK FOR RETINOPATHY: CPT | Mod: CPTII,S$GLB,, | Performed by: NURSE PRACTITIONER

## 2024-03-27 PROCEDURE — 1160F RVW MEDS BY RX/DR IN RCRD: CPT | Mod: CPTII,S$GLB,, | Performed by: NURSE PRACTITIONER

## 2024-03-27 PROCEDURE — 1159F MED LIST DOCD IN RCRD: CPT | Mod: CPTII,S$GLB,, | Performed by: NURSE PRACTITIONER

## 2024-03-27 PROCEDURE — 99214 OFFICE O/P EST MOD 30 MIN: CPT | Mod: S$GLB,,, | Performed by: NURSE PRACTITIONER

## 2024-03-27 NOTE — PROGRESS NOTES
Please inform patient of the following:    your labs have been reviewed: Your labs are improving on the present antibiotic therapy. I recommend to continue and to keep the follow up appointment with Urology.       If you have any further questions please contact our office    Sincerely,    Monika Gutierrez, DO

## 2024-03-27 NOTE — PROGRESS NOTES
"[facility]  Group: Nathan/Tatianna/Angela/Cyrus  NPs: Natali East/Cara Piedra    Note today written by:Cara Piedra  Date of Service: 03/27/2024    CHIEF COMPLAINT: F/u epididymo-orchitis     PRESENTING ILLNESS:    Evelio Pandya is a 84 y.o. male who presents for f/u epididymo-orchitis. Last clinic visit was 2/23/24 with Natali East NP     Last OV with me was on 1/8/2024 for LUTS.  After last OV with me, pt refused to be started on an OAB medication.  He wanted to try and see by eliminating certain p.o. food and drinks if his symptoms would improve.  He RTC today for f/up.  Since watching p.o. fluid and food intake especially at night, his nocturia has decreased to 1-2x nightly at this time.     HX:  Plan of Care by MD after last OV (10/23):  "I think that is overactive bladder was likely related to his acute stroke that he had.  It is since resolved I think his symptoms resolved on their own.  Rectal exam today reveals a small prostate.  He does state that he has some urinary hesitancy so he can restart his Flomax but just take 1 a night.  If he finds that it helps with the hesitancy then he can continue it but if he does not feel like it is helping with hesitancy he can stay off of it.     Also having some urgency which is the urge to urinate and feeling like he is not going to have enough time.  However no frequency.  First should stop drinking so much caffeine and would also consider an overactive bladder medication if he stops taking the caffeine was still having urgency.  Otherwise I would avoid especially since he does not even like taking medications and they have side effects.  Also stressed the importance of taking his diabetes and blood pressure medications   Dc finasteride - doesn't need. Used to shrink prostate.      Fu in 3 months with NP aua ssx, pvr, sx check-- see if urgency worsening. If he needs oab med would do myrbetriq but " "watch pressure."      HX:  Initial consult by Nathan in clinic on 10/5/23 for oab:  He is here with his daughter Georgia.  Was seen by us/Urology nurse practitioner in September after he had a TIA which resulted in temporary loss of vision.  He was 1st diagnosed with urinary retention in the ER.  However unclear how much actually drained.  Then he saw us in clinic on 09/26/2022.  His Archer had already been removed that day before because he had some blood in his Archer.  When he came to the clinic on 09/26/2022 his PVR was 229 Na was urinating frequently.  He was started on Flomax.  He returned the following day on 09/27/2022 and his Flomax has been increased in his PVR Was down to 13.  Still urinating frequently.    He returned again on November 7th and is PVR was 101.  Taking Flomax and finasteride was discussed.  Today he states that he is urinating every 1-3 hours.  Denies any significant frequency and says it is actually improved since a few months ago.  However does have urgency but without urge incontinence.  Wakes up about 3 times a night.  Denies any slow stream but having some hesistancy off Flomax 0.8 (which he ran out of 2 weeks ago).    Does drink 2-3 caffeinated drinks a day.  Denies any constipation.  Last A1c in July was normal 6.4.  No new diabetes med.  Daughter does not think he is taking his diabetes medications like.  Daughter wanted to bring him for fu  Ua: 250 gluc    OV 01/8/24:  UA performed-pt was unable to urinate  PVR by bladder scan today is 0 mL  Pt denies gross hematuria or dysuria at this time.  Pt currently takes Flomax 0.4 mg daily with no problems.  No longer taking Finasteride per MD orders last ov.  Pt remains with same LUTS as last ov regarding urinary frequency and urgency, more prominent at night time.  Nocturia is 3-4x nightly.    Interval history 3/27/24 (Tadeo SUN)  Pt was seen by PCP 3/18 and c/o dysuria. + urine culture, treated with macrobid.  Pt was seen in ED " "3/21/24 for groin pain, left testicle pain. UA leuk 1+. Culture no growth.  Scrotal US: Impression:   Findings consistent with left epididymal orchitis.   Right testicular cysts   Bilateral testicular flow without torsion  Pt discharged with levofloxacin    Today pt reports left testicle pain and swelling have improved with antibiotics. Denies dysuria, gross hematuria, flank pain, fever, chills, nausea or vomiting  Having urinary frequency, baseline  PVR 0 ml     Urine history: family history of kidney, bladder or prostate cancer:No, personal or family history of kidney stones: No,tobacco use: Yes - , anticoagulation: No  10/5/23            250 gluc/30 prot     PSA History: no fam hx of prostate cancer  10/5/23            Joseph: 20g       REVIEW OF SYSTEMS: as stated above in hpi    PATIENT HISTORY:    Past Medical History:   Diagnosis Date    Diabetes mellitus     Hypertension     TIA (transient ischemic attack)     Urinary retention        Past Surgical History:   Procedure Laterality Date    CATARACT EXTRACTION Right     GLAUCOMA SURGERY Right          PHYSICAL EXAMINATION:  Constitutional: He is oriented to person, place, and time. He appears well-developed and well-nourished.  He is in no apparent distress.  Abdominal:  He exhibits no distension.  There is no CVA tenderness.   Neurological: He is alert and oriented to person, place, and time.   Psych: Cooperative with normal affect.    Genitourinary: + left hemiscrotal swelling (improved per pt), mild tenderness to left testicle on exam    Physical Exam      LABS:      No results found for: "PSA", "PSADIAG", "PSATOTAL", "PSAFREE", "PSAFREEPCT"  Lab Results   Component Value Date    CREATININE 1.3 03/26/2024         IMPRESSION:    Encounter Diagnoses   Name Primary?    Epididymo-orchitis        PLAN:  -Continue levofloxacin as ordered  -Recommend supportive management with scrotal support (e.g., jock strap), ice pack, scrotal elevation when lying down. Patient " instructed to avoid any strenuous activity or heavy lifting until pain resolves. Informed patient that he should return to the emergency department immediately should he experience fever or worsening pain and swelling.     -Repeat scrotal US and UA/culture in 2 weeks. Will call with results    -F/u as scheduled with Natali      I encouraged him or any of his family members to call or email me with questions and/or concerns.      30 minutes of total time spent on the encounter, which includes face to face time and non-face to face time preparing to see the patient (eg, review of tests), Obtaining and/or reviewing separately obtained history, Documenting clinical information in the electronic or other health record, Independently interpreting results (not separately reported) and communicating results to the patient/family/caregiver, or Care coordination (not separately reported).

## 2024-03-27 NOTE — PATIENT INSTRUCTIONS
Recommend supportive management with scrotal support (e.g., jock strap), ice pack, scrotal elevation when lying down. Patient instructed to avoid any strenuous activity or heavy lifting until pain resolves. Informed patient that he should return to the emergency department immediately should he experience fever or worsening pain and swelling.     Continue antibiotics as ordered    Will repeat ultrasound and urine in 2 weeks

## 2024-04-03 NOTE — PROGRESS NOTES
This dictation has been generated using Modal Fluency Dictation some phonetic errors may occur. Please contact author for clarification if needed.     Problem List Items Addressed This Visit    None  Visit Diagnoses       Urinary retention    -  Primary    BPH likely cause    Relevant Orders    Ambulatory referral/consult to Urology    Hypotensive episode                Orders Placed This Encounter    Ambulatory referral/consult to Urology    lisinopriL (PRINIVIL,ZESTRIL) 20 MG tablet     Urinary retention likely due to BPH.  Needs follow-up with Urology for catheterization discontinuation.  Flomax as directed per ER.    Hypotension decrease lisinopril continue Flomax continue other meds.  Reviewed ER report with patient.  Reviewed records as noted connected to visit.  No follow-ups on file.    ________________________________________________________________  ________________________________________________________________      Chief Complaint   Patient presents with    Hospital Follow Up     History of present illness  This 82 y.o. presents today for complaint of follow-up from emergency department.  Patient had urinary retention and has an indwelling catheter.  He does note some discomfort.  Denies any blood in the urine.  He is having any interest in having it discontinued today.  Discussed with him need for follow-up with Urology and evaluation after discontinuation as I would not be able to replace catheter.  Emergency room had recommended follow-up with Urology.  He is taking the Flomax as directed.  Denies any fever chills.  No blood in urine.  Patient denies any back pain.        History reviewed. No pertinent past medical history.    History reviewed. No pertinent surgical history.    History reviewed. No pertinent family history.    Social History     Socioeconomic History    Marital status: Unknown   Tobacco Use    Smoking status: Never    Smokeless tobacco: Never   Substance and Sexual Activity    Sexual  "activity: Not Currently       Current Outpatient Medications   Medication Sig Dispense Refill    amLODIPine (NORVASC) 10 MG tablet Take 10 mg by mouth once daily.      aspirin (ECOTRIN) 81 MG EC tablet Take 81 mg by mouth once daily.      atorvastatin (LIPITOR) 80 MG tablet Take 80 mg by mouth once daily.      BD ULTRA-FINE SHORT PEN NEEDLE 31 gauge x 5/16" Ndle Inject into the skin.      brimonidine 0.15 % OPTH DROP (ALPHAGAN) 0.15 % ophthalmic solution Place 1 drop into both eyes 3 (three) times daily.      brimonidine 0.2% (ALPHAGAN) 0.2 % Drop Place 1 drop into both eyes 2 (two) times daily.      clopidogreL (PLAVIX) 75 mg tablet Take 75 mg by mouth once daily.      COSOPT 22.3-6.8 mg/mL ophthalmic solution Place 1 drop into both eyes 2 (two) times daily.      gabapentin (NEURONTIN) 300 MG capsule Take 300 mg by mouth 3 (three) times daily.      metFORMIN (GLUCOPHAGE) 1000 MG tablet Take 2,000 mg by mouth every morning.      NOVOLOG FLEXPEN U-100 INSULIN 100 unit/mL (3 mL) InPn pen SMARTSI Unit(s) SUB-Q 3 Times Daily      tamsulosin (FLOMAX) 0.4 mg Cap Take 1 capsule (0.4 mg total) by mouth once daily. for 14 days 14 capsule 0    tiZANidine (ZANAFLEX) 4 MG tablet Take 4 mg by mouth 2 (two) times daily as needed.      travoprost (TRAVATAN Z) 0.004 % ophthalmic solution Place 1 drop into both eyes every evening.      TRUE METRIX GLUCOSE TEST STRIP Strp 3 (three) times daily. Use to test blood glucose      LEVEMIR FLEXTOUCH U-100 INSULN 100 unit/mL (3 mL) InPn pen SMARTSI Unit(s) SUB-Q Twice Daily      lisinopriL (PRINIVIL,ZESTRIL) 20 MG tablet Take 1 tablet (20 mg total) by mouth once daily. 90 tablet 3     No current facility-administered medications for this visit.       Review of patient's allergies indicates:  No Known Allergies    Physical examination  Vitals Reviewed\  Vitals:    22 1409   BP: (!) 98/56   Pulse: 91   Temp: 98.6 °F (37 °C)     Body mass index is 31.67 kg/m². .     Weight: 89 kg " (196 lb 3.4 oz)    Gen. Well-dressed well-nourished   Skin warm dry and intact.  No rashes noted.  HEENT.  Masked    Neck is supple without adenopathy  Chest.  Respirations are even unlabored.  Lungs are clear to auscultation.  Cardiac regular rate and rhythm.  No chest wall adenopathy noted.  Neuro. Awake alert oriented x4.  Normal judgment and cognition noted.  Extremities no clubbing cyanosis or edema noted.     Call or return to clinic prn if these symptoms worsen or fail to improve as anticipated.         fall

## 2024-04-18 ENCOUNTER — HOSPITAL ENCOUNTER (EMERGENCY)
Facility: HOSPITAL | Age: 84
Discharge: HOME | End: 2024-04-18
Attending: EMERGENCY MEDICINE
Payer: MEDICARE

## 2024-04-18 VITALS
DIASTOLIC BLOOD PRESSURE: 79 MMHG | SYSTOLIC BLOOD PRESSURE: 133 MMHG | TEMPERATURE: 98 F | RESPIRATION RATE: 20 BRPM | WEIGHT: 190 LBS | OXYGEN SATURATION: 99 % | HEART RATE: 81 BPM | BODY MASS INDEX: 30.67 KG/M2

## 2024-04-18 DIAGNOSIS — M54.6 ACUTE RIGHT-SIDED THORACIC BACK PAIN: Primary | ICD-10-CM

## 2024-04-18 DIAGNOSIS — R07.9 CHEST PAIN: ICD-10-CM

## 2024-04-18 LAB
ANION GAP SERPL CALC-SCNC: 6 MMOL/L (ref 8–16)
BASOPHILS # BLD AUTO: 0.03 K/UL (ref 0–0.2)
BASOPHILS NFR BLD: 0.4 % (ref 0–1.9)
BUN SERPL-MCNC: 14 MG/DL (ref 8–23)
CALCIUM SERPL-MCNC: 9.1 MG/DL (ref 8.7–10.5)
CHLORIDE SERPL-SCNC: 105 MMOL/L (ref 95–110)
CO2 SERPL-SCNC: 23 MMOL/L (ref 23–29)
CREAT SERPL-MCNC: 1.1 MG/DL (ref 0.5–1.4)
DIFFERENTIAL METHOD BLD: ABNORMAL
EOSINOPHIL # BLD AUTO: 0.4 K/UL (ref 0–0.5)
EOSINOPHIL NFR BLD: 5.3 % (ref 0–8)
ERYTHROCYTE [DISTWIDTH] IN BLOOD BY AUTOMATED COUNT: 12.5 % (ref 11.5–14.5)
EST. GFR  (NO RACE VARIABLE): >60 ML/MIN/1.73 M^2
GLUCOSE SERPL-MCNC: 153 MG/DL (ref 70–110)
HCT VFR BLD AUTO: 32.9 % (ref 40–54)
HGB BLD-MCNC: 10.6 G/DL (ref 14–18)
IMM GRANULOCYTES # BLD AUTO: 0.05 K/UL (ref 0–0.04)
IMM GRANULOCYTES NFR BLD AUTO: 0.6 % (ref 0–0.5)
LYMPHOCYTES # BLD AUTO: 2.7 K/UL (ref 1–4.8)
LYMPHOCYTES NFR BLD: 31.8 % (ref 18–48)
MCH RBC QN AUTO: 31.8 PG (ref 27–31)
MCHC RBC AUTO-ENTMCNC: 32.2 G/DL (ref 32–36)
MCV RBC AUTO: 99 FL (ref 82–98)
MONOCYTES # BLD AUTO: 0.5 K/UL (ref 0.3–1)
MONOCYTES NFR BLD: 6 % (ref 4–15)
NEUTROPHILS # BLD AUTO: 4.7 K/UL (ref 1.8–7.7)
NEUTROPHILS NFR BLD: 55.9 % (ref 38–73)
NRBC BLD-RTO: 0 /100 WBC
PLATELET # BLD AUTO: 193 K/UL (ref 150–450)
PMV BLD AUTO: 8.8 FL (ref 9.2–12.9)
POTASSIUM SERPL-SCNC: 4.7 MMOL/L (ref 3.5–5.1)
RBC # BLD AUTO: 3.33 M/UL (ref 4.6–6.2)
SODIUM SERPL-SCNC: 134 MMOL/L (ref 136–145)
TROPONIN I SERPL DL<=0.01 NG/ML-MCNC: 0.01 NG/ML (ref 0–0.03)
WBC # BLD AUTO: 8.34 K/UL (ref 3.9–12.7)

## 2024-04-18 PROCEDURE — 99285 EMERGENCY DEPT VISIT HI MDM: CPT | Mod: 25

## 2024-04-18 PROCEDURE — 93005 ELECTROCARDIOGRAM TRACING: CPT

## 2024-04-18 PROCEDURE — 36415 COLL VENOUS BLD VENIPUNCTURE: CPT | Performed by: EMERGENCY MEDICINE

## 2024-04-18 PROCEDURE — 85025 COMPLETE CBC W/AUTO DIFF WBC: CPT | Performed by: EMERGENCY MEDICINE

## 2024-04-18 PROCEDURE — 84484 ASSAY OF TROPONIN QUANT: CPT | Performed by: EMERGENCY MEDICINE

## 2024-04-18 PROCEDURE — 80048 BASIC METABOLIC PNL TOTAL CA: CPT | Performed by: EMERGENCY MEDICINE

## 2024-04-18 PROCEDURE — 93010 ELECTROCARDIOGRAM REPORT: CPT | Mod: ,,, | Performed by: INTERNAL MEDICINE

## 2024-04-18 RX ORDER — VALACYCLOVIR HYDROCHLORIDE 1 G/1
1000 TABLET, FILM COATED ORAL 3 TIMES DAILY
Qty: 21 TABLET | Refills: 0 | Status: SHIPPED | OUTPATIENT
Start: 2024-04-18 | End: 2024-04-25
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
## 2024-04-18 NOTE — PHARMACY MED REC
"Admission Medication History     The home medication history was taken by John Madsen.    You may go to "Admission" then "Reconcile Home Medications" tabs to review and/or act upon these items.     The home medication list has been updated by the Pharmacy department.   Please read ALL comments highlighted in yellow.   Please address this information as you see fit.    Feel free to contact us if you have any questions or require assistance.        Medications listed below were obtained from: Patient/family and Analytic software- [employer]  No current facility-administered medications on file prior to encounter.     Current Outpatient Medications on File Prior to Encounter   Medication Sig Dispense Refill    amLODIPine (NORVASC) 10 MG tablet Take 10 mg by mouth every evening.      aspirin (ECOTRIN) 81 MG EC tablet Take 81 mg by mouth every evening.      atorvastatin (LIPITOR) 80 MG tablet Take 1 tablet (80 mg total) by mouth once daily. (Patient taking differently: Take 80 mg by mouth every evening.) 90 tablet 1    brimonidine 0.2% (ALPHAGAN) 0.2 % Drop Place 1 drop into both eyes every 12 (twelve) hours. 30 mL 6    clopidogreL (PLAVIX) 75 mg tablet TAKE 1 TABLET(75 MG) BY MOUTH EVERY DAY (Patient taking differently: Take 75 mg by mouth every evening.) 90 tablet 1    dorzolamide-timolol 2-0.5% (COSOPT) 22.3-6.8 mg/mL ophthalmic solution Place 1 drop into both eyes every 12 (twelve) hours. 30 mL 6    hydrALAZINE (APRESOLINE) 50 MG tablet Take 1 tablet (50 mg total) by mouth every 8 (eight) hours as needed (for SBP > 160). 270 tablet 0    insulin detemir U-100, Levemir, (LEVEMIR FLEXPEN) 100 unit/mL (3 mL) InPn pen Inject 15 Units into the skin every evening. 15 mL 2    losartan (COZAAR) 50 MG tablet Take 1 tablet (50 mg total) by mouth once daily. (Patient taking differently: Take 50 mg by mouth nightly.) 90 tablet 0    NOVOLOG FLEXPEN U-100 INSULIN 100 unit/mL (3 mL) InPn pen SMARTSI Unit(s) SUB-Q 3 Times Daily " "(Patient taking differently: Inject 8-9 Units into the skin 3 (three) times daily with meals. SMARTSI Unit(s) SUB-Q 3 Times Daily) 15 mL 11    tamsulosin (FLOMAX) 0.4 mg Cap Take 1 capsule (0.4 mg total) by mouth every evening. (Patient taking differently: Take 0.8 mg by mouth every evening.) 90 capsule 3    travoprost (TRAVATAN Z) 0.004 % ophthalmic solution Place 1 drop into both eyes every evening. 7.5 mL 6    BD ULTRA-FINE SHORT PEN NEEDLE 31 gauge x 5/16" Ndle Insulin injection 4 times a day 120 each 11    gabapentin (NEURONTIN) 300 MG capsule Take 300 mg by mouth every evening. (Patient not taking: Reported on 2024)      semaglutide (OZEMPIC) 0.25 mg or 0.5 mg (2 mg/3 mL) pen injector Inject 0.25 mg into the skin every 7 days for 14 days, THEN 0.5 mg every 7 days for 21 days. 3 mL 0    TRUE METRIX GLUCOSE TEST STRIP Strp 3 (three) times daily. Use to test blood glucose 100 each 11    TRUEPLUS LANCETS 33 gauge Misc              John Madsen                  .          "

## 2024-04-18 NOTE — DISCHARGE INSTRUCTIONS
Start antiviral if you notice a painful rash developing in the area where you are currently having pain.  Otherwise, you may throw out the prescription after one-week.    As we discussed, return to the emergency department for new or worsening symptoms including new chest pain, trouble breathing, or passing out.

## 2024-04-29 LAB
OHS QRS DURATION: 84 MS
OHS QTC CALCULATION: 420 MS

## 2024-05-08 ENCOUNTER — OFFICE VISIT (OUTPATIENT)
Dept: FAMILY MEDICINE | Facility: CLINIC | Age: 84
End: 2024-05-08
Payer: MEDICARE

## 2024-05-08 VITALS
DIASTOLIC BLOOD PRESSURE: 70 MMHG | BODY MASS INDEX: 31.18 KG/M2 | WEIGHT: 194 LBS | HEART RATE: 78 BPM | HEIGHT: 66 IN | OXYGEN SATURATION: 97 % | SYSTOLIC BLOOD PRESSURE: 122 MMHG

## 2024-05-08 DIAGNOSIS — Z79.4 TYPE 2 DIABETES MELLITUS WITH HYPERGLYCEMIA, WITH LONG-TERM CURRENT USE OF INSULIN: ICD-10-CM

## 2024-05-08 DIAGNOSIS — E11.65 TYPE 2 DIABETES MELLITUS WITH HYPERGLYCEMIA, WITH LONG-TERM CURRENT USE OF INSULIN: ICD-10-CM

## 2024-05-08 DIAGNOSIS — N30.00 ACUTE CYSTITIS WITHOUT HEMATURIA: ICD-10-CM

## 2024-05-08 DIAGNOSIS — I10 HYPERTENSION, UNSPECIFIED TYPE: ICD-10-CM

## 2024-05-08 DIAGNOSIS — N18.31 STAGE 3A CHRONIC KIDNEY DISEASE: ICD-10-CM

## 2024-05-08 DIAGNOSIS — Z00.00 ENCOUNTER FOR ANNUAL GENERAL MEDICAL EXAMINATION WITHOUT ABNORMAL FINDINGS IN ADULT: Primary | ICD-10-CM

## 2024-05-08 DIAGNOSIS — H40.1122 PRIMARY OPEN-ANGLE GLAUCOMA, LEFT EYE, MODERATE STAGE: ICD-10-CM

## 2024-05-08 DIAGNOSIS — I70.0 ATHEROSCLEROSIS OF AORTA: ICD-10-CM

## 2024-05-08 DIAGNOSIS — Z76.89 ENCOUNTER TO ESTABLISH CARE: ICD-10-CM

## 2024-05-08 DIAGNOSIS — H40.1113 PRIMARY OPEN-ANGLE GLAUCOMA, RIGHT EYE, SEVERE STAGE: ICD-10-CM

## 2024-05-08 LAB
BILIRUB SERPL-MCNC: NEGATIVE MG/DL
BLOOD URINE, POC: NEGATIVE
CLARITY, POC UA: CLEAR
COLOR, POC UA: YELLOW
GLUCOSE UR QL STRIP: NEGATIVE
KETONES UR QL STRIP: NEGATIVE
LEUKOCYTE ESTERASE URINE, POC: NEGATIVE
NITRITE, POC UA: NEGATIVE
PH, POC UA: 5.5
PROTEIN, POC: NEGATIVE
SPECIFIC GRAVITY, POC UA: 1.01
UROBILINOGEN, POC UA: 0.2

## 2024-05-08 PROCEDURE — 3078F DIAST BP <80 MM HG: CPT | Mod: CPTII,S$GLB,, | Performed by: STUDENT IN AN ORGANIZED HEALTH CARE EDUCATION/TRAINING PROGRAM

## 2024-05-08 PROCEDURE — 3072F LOW RISK FOR RETINOPATHY: CPT | Mod: CPTII,S$GLB,, | Performed by: STUDENT IN AN ORGANIZED HEALTH CARE EDUCATION/TRAINING PROGRAM

## 2024-05-08 PROCEDURE — 1160F RVW MEDS BY RX/DR IN RCRD: CPT | Mod: CPTII,S$GLB,, | Performed by: STUDENT IN AN ORGANIZED HEALTH CARE EDUCATION/TRAINING PROGRAM

## 2024-05-08 PROCEDURE — 87086 URINE CULTURE/COLONY COUNT: CPT | Performed by: STUDENT IN AN ORGANIZED HEALTH CARE EDUCATION/TRAINING PROGRAM

## 2024-05-08 PROCEDURE — 1126F AMNT PAIN NOTED NONE PRSNT: CPT | Mod: CPTII,S$GLB,, | Performed by: STUDENT IN AN ORGANIZED HEALTH CARE EDUCATION/TRAINING PROGRAM

## 2024-05-08 PROCEDURE — 1159F MED LIST DOCD IN RCRD: CPT | Mod: CPTII,S$GLB,, | Performed by: STUDENT IN AN ORGANIZED HEALTH CARE EDUCATION/TRAINING PROGRAM

## 2024-05-08 PROCEDURE — 3074F SYST BP LT 130 MM HG: CPT | Mod: CPTII,S$GLB,, | Performed by: STUDENT IN AN ORGANIZED HEALTH CARE EDUCATION/TRAINING PROGRAM

## 2024-05-08 PROCEDURE — 81002 URINALYSIS NONAUTO W/O SCOPE: CPT | Mod: S$GLB,,, | Performed by: STUDENT IN AN ORGANIZED HEALTH CARE EDUCATION/TRAINING PROGRAM

## 2024-05-08 PROCEDURE — 1101F PT FALLS ASSESS-DOCD LE1/YR: CPT | Mod: CPTII,S$GLB,, | Performed by: STUDENT IN AN ORGANIZED HEALTH CARE EDUCATION/TRAINING PROGRAM

## 2024-05-08 PROCEDURE — 99214 OFFICE O/P EST MOD 30 MIN: CPT | Mod: S$GLB,,, | Performed by: STUDENT IN AN ORGANIZED HEALTH CARE EDUCATION/TRAINING PROGRAM

## 2024-05-08 PROCEDURE — 3288F FALL RISK ASSESSMENT DOCD: CPT | Mod: CPTII,S$GLB,, | Performed by: STUDENT IN AN ORGANIZED HEALTH CARE EDUCATION/TRAINING PROGRAM

## 2024-05-08 PROCEDURE — G2211 COMPLEX E/M VISIT ADD ON: HCPCS | Mod: S$GLB,,, | Performed by: STUDENT IN AN ORGANIZED HEALTH CARE EDUCATION/TRAINING PROGRAM

## 2024-05-08 PROCEDURE — 99999 PR PBB SHADOW E&M-EST. PATIENT-LVL IV: CPT | Mod: PBBFAC,,, | Performed by: STUDENT IN AN ORGANIZED HEALTH CARE EDUCATION/TRAINING PROGRAM

## 2024-05-08 RX ORDER — AMLODIPINE BESYLATE 10 MG/1
10 TABLET ORAL NIGHTLY
Qty: 90 TABLET | Refills: 3 | Status: SHIPPED | OUTPATIENT
Start: 2024-05-08 | End: 2025-05-08

## 2024-05-08 RX ORDER — TRAVOPROST OPHTHALMIC SOLUTION 0.04 MG/ML
1 SOLUTION OPHTHALMIC NIGHTLY
Qty: 7.5 ML | Refills: 2 | Status: SHIPPED | OUTPATIENT
Start: 2024-05-08

## 2024-05-08 RX ORDER — CIPROFLOXACIN 500 MG/1
500 TABLET ORAL 2 TIMES DAILY
Qty: 14 TABLET | Refills: 0 | Status: SHIPPED | OUTPATIENT
Start: 2024-05-08 | End: 2024-05-15

## 2024-05-08 RX ORDER — BRIMONIDINE TARTRATE 2 MG/ML
1 SOLUTION/ DROPS OPHTHALMIC EVERY 12 HOURS
Qty: 30 ML | Refills: 2 | Status: SHIPPED | OUTPATIENT
Start: 2024-05-08

## 2024-05-08 RX ORDER — DORZOLAMIDE HYDROCHLORIDE AND TIMOLOL MALEATE 20; 5 MG/ML; MG/ML
1 SOLUTION/ DROPS OPHTHALMIC EVERY 12 HOURS
Qty: 30 ML | Refills: 2 | Status: SHIPPED | OUTPATIENT
Start: 2024-05-08

## 2024-05-08 RX ORDER — SEMAGLUTIDE 1.34 MG/ML
1 INJECTION, SOLUTION SUBCUTANEOUS
Qty: 9 ML | Refills: 1 | Status: SHIPPED | OUTPATIENT
Start: 2024-05-08 | End: 2024-11-04

## 2024-05-08 NOTE — PROGRESS NOTES
SUBJECTIVE:    CHIEF COMPLAINT:   Chief Complaint   Patient presents with    Follow-up     Fatigue           274}    HISTORY OF PRESENT ILLNESS:  Evelio Pandya is a 84 y.o. male with PMHx below who presents to the clinic today for follow up. He is accompanied by his Daughter.     She reports Malordorous urine, with cloudy appearance. Denies, fevers, chills. SOB, or chest pain, n/v/d    PAST MEDICAL HISTORY:     274}  Past Medical History:   Diagnosis Date    Diabetes mellitus     Hypertension     TIA (transient ischemic attack)     Urinary retention        PAST SURGICAL HISTORY:  Past Surgical History:   Procedure Laterality Date    CATARACT EXTRACTION Right     GLAUCOMA SURGERY Right        SOCIAL HISTORY:  Social History     Socioeconomic History    Marital status:    Tobacco Use    Smoking status: Never    Smokeless tobacco: Never   Substance and Sexual Activity    Alcohol use: Not Currently    Sexual activity: Not Currently     Social Determinants of Health     Financial Resource Strain: Medium Risk (4/14/2024)    Overall Financial Resource Strain (CARDIA)     Difficulty of Paying Living Expenses: Somewhat hard   Food Insecurity: Food Insecurity Present (4/14/2024)    Hunger Vital Sign     Worried About Running Out of Food in the Last Year: Sometimes true     Ran Out of Food in the Last Year: Never true   Transportation Needs: No Transportation Needs (4/14/2024)    PRAPARE - Transportation     Lack of Transportation (Medical): No     Lack of Transportation (Non-Medical): No   Physical Activity: Inactive (4/14/2024)    Exercise Vital Sign     Days of Exercise per Week: 0 days     Minutes of Exercise per Session: 60 min   Stress: No Stress Concern Present (4/14/2024)    [facility] [employer] [facility] - Occupational Stress Questionnaire     Feeling of Stress : Not at all   Housing Stability: Unknown (3/7/2024)    Housing Stability Vital Sign     Unable to Pay for Housing in the Last Year: No      "Unstable Housing in the Last Year: No       FAMILY HISTORY:       Family History   Family history unknown: Yes       ALLERGIES AND MEDICATIONS: updated and reviewed.      274}  Review of patient's allergies indicates:  No Known Allergies  Medication List with Changes/Refills   New Medications    CIPROFLOXACIN HCL (CIPRO) 500 MG TABLET    Take 1 tablet (500 mg total) by mouth 2 (two) times daily. for 7 days    SEMAGLUTIDE (OZEMPIC) 1 MG/DOSE (4 MG/3 ML)    Inject 1 mg into the skin every 7 days. For Diabetes   Current Medications    ASPIRIN (ECOTRIN) 81 MG EC TABLET    Take 81 mg by mouth every evening.    ATORVASTATIN (LIPITOR) 80 MG TABLET    Take 1 tablet (80 mg total) by mouth once daily.    BD ULTRA-FINE SHORT PEN NEEDLE 31 GAUGE X 5/16" NDLE    Insulin injection 4 times a day    CLOPIDOGREL (PLAVIX) 75 MG TABLET    TAKE 1 TABLET(75 MG) BY MOUTH EVERY DAY    GABAPENTIN (NEURONTIN) 300 MG CAPSULE    Take 300 mg by mouth every evening.    HYDRALAZINE (APRESOLINE) 50 MG TABLET    Take 1 tablet (50 mg total) by mouth every 8 (eight) hours as needed (for SBP > 160).    INSULIN DETEMIR U-100, LEVEMIR, (LEVEMIR FLEXPEN) 100 UNIT/ML (3 ML) INPN PEN    Inject 15 Units into the skin every evening.    LOSARTAN (COZAAR) 50 MG TABLET    Take 1 tablet (50 mg total) by mouth once daily.    NOVOLOG FLEXPEN U-100 INSULIN 100 UNIT/ML (3 ML) INPN PEN    SMARTSI Unit(s) SUB-Q 3 Times Daily    TAMSULOSIN (FLOMAX) 0.4 MG CAP    Take 1 capsule (0.4 mg total) by mouth every evening.    TRUE METRIX GLUCOSE TEST STRIP STRP    3 (three) times daily. Use to test blood glucose    TRUEPLUS LANCETS 33 GAUGE MISC        VALACYCLOVIR (VALTREX) 1000 MG TABLET    Take 1 tablet (1,000 mg total) by mouth 3 (three) times daily. for 7 days   Changed and/or Refilled Medications    Modified Medication Previous Medication    AMLODIPINE (NORVASC) 10 MG TABLET amLODIPine (NORVASC) 10 MG tablet       Take 1 tablet (10 mg total) by mouth every evening. " "For Hypertension    Take 10 mg by mouth every evening.    BRIMONIDINE 0.2% (ALPHAGAN) 0.2 % DROP brimonidine 0.2% (ALPHAGAN) 0.2 % Drop       Place 1 drop into both eyes every 12 (twelve) hours.    Place 1 drop into both eyes every 12 (twelve) hours.    DORZOLAMIDE-TIMOLOL 2-0.5% (COSOPT) 22.3-6.8 MG/ML OPHTHALMIC SOLUTION dorzolamide-timolol 2-0.5% (COSOPT) 22.3-6.8 mg/mL ophthalmic solution       Place 1 drop into both eyes every 12 (twelve) hours.    Place 1 drop into both eyes every 12 (twelve) hours.    TRAVOPROST (TRAVATAN Z) 0.004 % OPHTHALMIC SOLUTION travoprost (TRAVATAN Z) 0.004 % ophthalmic solution       Place 1 drop into both eyes every evening.    Place 1 drop into both eyes every evening.   Discontinued Medications    SEMAGLUTIDE (OZEMPIC) 0.25 MG OR 0.5 MG (2 MG/3 ML) PEN INJECTOR    Inject 0.25 mg into the skin every 7 days for 14 days, THEN 0.5 mg every 7 days for 21 days.       SCREENING HISTORY:    274}  Health Maintenance         Date Due Completion Date    Pneumococcal Vaccines (Age 65+) (1 of 2 - PCV) Never done ---    TETANUS VACCINE Never done ---    Shingles Vaccine (1 of 2) Never done ---    RSV Vaccine (Age 60+ and Pregnant patients) (1 - 1-dose 60+ series) Never done ---    COVID-19 Vaccine (3 - 2023-24 season) 09/01/2023 3/4/2021    Diabetes Urine Screening 12/30/2023 12/30/2022    Eye Exam 03/21/2024 3/21/2023    Influenza Vaccine (Season Ended) 09/01/2024 9/28/2022    Hemoglobin A1c 09/26/2024 3/26/2024    Lipid Panel 03/06/2025 3/6/2024    Aspirin/Antiplatelet Therapy 05/08/2025 5/8/2024            REVIEW OF SYSTEMS:   Review of Systems    PHYSICAL EXAM:      274}  /70 (BP Location: Right arm, Patient Position: Sitting, BP Method: Large (Manual))   Pulse 78   Ht 5' 6" (1.676 m)   Wt 88 kg (194 lb 0.1 oz)   SpO2 97%   BMI 31.31 kg/m²   Wt Readings from Last 3 Encounters:   05/08/24 88 kg (194 lb 0.1 oz)   04/18/24 86.2 kg (190 lb)   03/27/24 86.6 kg (190 lb 14.7 oz)     BP " "Readings from Last 3 Encounters:   05/08/24 122/70   04/18/24 133/79   03/26/24 132/74     Estimated body mass index is 31.31 kg/m² as calculated from the following:    Height as of this encounter: 5' 6" (1.676 m).    Weight as of this encounter: 88 kg (194 lb 0.1 oz).     Physical Exam  Constitutional:       Appearance: Normal appearance. He is obese.   HENT:      Nose: No rhinorrhea.      Mouth/Throat:      Mouth: Mucous membranes are dry.   Cardiovascular:      Rate and Rhythm: Normal rate and regular rhythm.      Pulses: Normal pulses.      Heart sounds: Normal heart sounds.   Pulmonary:      Effort: Pulmonary effort is normal.      Breath sounds: Normal breath sounds.   Neurological:      Mental Status: He is alert and oriented to person, place, and time. Mental status is at baseline.   Psychiatric:         Behavior: Behavior normal.         Judgment: Judgment normal.         LABS:   274}  I have reviewed old labs below:  Lab Results   Component Value Date    WBC 8.34 04/18/2024    HGB 10.6 (L) 04/18/2024    HCT 32.9 (L) 04/18/2024    MCV 99 (H) 04/18/2024     04/18/2024     (L) 04/18/2024    K 4.7 04/18/2024     04/18/2024    CALCIUM 9.1 04/18/2024    CO2 23 04/18/2024     (H) 04/18/2024    BUN 14 04/18/2024    CREATININE 1.1 04/18/2024    ANIONGAP 6 (L) 04/18/2024    PROT 8.0 03/26/2024    ALBUMIN 3.2 (L) 03/26/2024    BILITOT 0.5 03/26/2024    ALKPHOS 104 03/26/2024    ALT 48 (H) 03/26/2024    AST 29 03/26/2024    INR 1.1 03/06/2024    CHOL 112 (L) 03/06/2024    TRIG 88 03/06/2024    HDL 39 (L) 03/06/2024    LDLCALC 55.4 (L) 03/06/2024    TSH 1.360 03/06/2024    HGBA1C 6.4 (H) 03/26/2024       ASSESSMENT AND PLAN:  274}  1. Encounter for annual general medical examination without abnormal findings in adult  -     PSA, SCREENING; Future; Expected date: 05/08/2024    2. Encounter to establish care    3. Type 2 diabetes mellitus with hyperglycemia, with long-term current use of " insulin  Comments:  Refilled ozempic  Overview:  Diabetes controlled, hyperglycemia at evening.  Should case of a hypoglycemia in the morning.    Orders:  -     Lipid Panel; Future; Expected date: 05/08/2024  -     Hemoglobin A1C; Future; Expected date: 05/08/2024  -     Microalbumin/Creatinine Ratio, Urine; Future  -     semaglutide (OZEMPIC) 1 mg/dose (4 mg/3 mL); Inject 1 mg into the skin every 7 days. For Diabetes  Dispense: 9 mL; Refill: 1    4. Stage 3a chronic kidney disease  Overview:  Noted by [facility]  last documented on [id number]    Orders:  -     Comprehensive Metabolic Panel; Future; Expected date: 05/08/2024    5. Acute cystitis without hematuria  Comments:  Start Cipro 500mg PO BID. Recommend follow up with Urology.  Orders:  -     ciprofloxacin HCl (CIPRO) 500 MG tablet; Take 1 tablet (500 mg total) by mouth 2 (two) times daily. for 7 days  Dispense: 14 tablet; Refill: 0  -     POCT URINE DIPSTICK WITHOUT MICROSCOPE  -     CULTURE, URINE    6. Atherosclerosis of aorta  Comments:  c/w Lipitor 80mg PO daily.    7. Primary open-angle glaucoma, right eye, severe stage  Comments:  refill  Orders:  -     travoprost (TRAVATAN Z) 0.004 % ophthalmic solution; Place 1 drop into both eyes every evening.  Dispense: 7.5 mL; Refill: 2  -     dorzolamide-timolol 2-0.5% (COSOPT) 22.3-6.8 mg/mL ophthalmic solution; Place 1 drop into both eyes every 12 (twelve) hours.  Dispense: 30 mL; Refill: 2  -     brimonidine 0.2% (ALPHAGAN) 0.2 % Drop; Place 1 drop into both eyes every 12 (twelve) hours.  Dispense: 30 mL; Refill: 2    8. Primary open-angle glaucoma, left eye, moderate stage  Comments:  Refill  Orders:  -     travoprost (TRAVATAN Z) 0.004 % ophthalmic solution; Place 1 drop into both eyes every evening.  Dispense: 7.5 mL; Refill: 2  -     dorzolamide-timolol 2-0.5% (COSOPT) 22.3-6.8 mg/mL ophthalmic solution; Place 1 drop into both eyes every 12 (twelve) hours.  Dispense: 30 mL; Refill: 2  -      brimonidine 0.2% (ALPHAGAN) 0.2 % Drop; Place 1 drop into both eyes every 12 (twelve) hours.  Dispense: 30 mL; Refill: 2    9. Hypertension, unspecified type  Comments:  c/w Losartan 50mg PO Daily. BP controlled.  Orders:  -     amLODIPine (NORVASC) 10 MG tablet; Take 1 tablet (10 mg total) by mouth every evening. For Hypertension  Dispense: 90 tablet; Refill: 3           Orders Placed This Encounter   Procedures    CULTURE, URINE    Lipid Panel    Hemoglobin A1C    Comprehensive Metabolic Panel    Microalbumin/Creatinine Ratio, Urine    PSA, SCREENING    POCT URINE DIPSTICK WITHOUT MICROSCOPE       Follow up in about 6 months (around 11/8/2024). or sooner as needed.

## 2024-05-10 LAB — BACTERIA UR CULT: NORMAL

## 2024-06-10 ENCOUNTER — OFFICE VISIT (OUTPATIENT)
Dept: FAMILY MEDICINE | Facility: CLINIC | Age: 84
End: 2024-06-10
Payer: MEDICARE

## 2024-06-10 ENCOUNTER — OFFICE VISIT (OUTPATIENT)
Dept: HOME HEALTH SERVICES | Facility: CLINIC | Age: 84
End: 2024-06-10
Payer: MEDICARE

## 2024-06-10 VITALS
WEIGHT: 195 LBS | SYSTOLIC BLOOD PRESSURE: 116 MMHG | DIASTOLIC BLOOD PRESSURE: 76 MMHG | BODY MASS INDEX: 31.47 KG/M2 | HEART RATE: 88 BPM | OXYGEN SATURATION: 100 %

## 2024-06-10 VITALS
OXYGEN SATURATION: 99 % | HEART RATE: 89 BPM | HEIGHT: 66 IN | RESPIRATION RATE: 18 BRPM | BODY MASS INDEX: 31.67 KG/M2 | DIASTOLIC BLOOD PRESSURE: 74 MMHG | WEIGHT: 197.06 LBS | SYSTOLIC BLOOD PRESSURE: 110 MMHG

## 2024-06-10 DIAGNOSIS — E78.5 HYPERLIPIDEMIA ASSOCIATED WITH TYPE 2 DIABETES MELLITUS: ICD-10-CM

## 2024-06-10 DIAGNOSIS — N18.31 TYPE 2 DIABETES MELLITUS WITH STAGE 3A CHRONIC KIDNEY DISEASE, WITH LONG-TERM CURRENT USE OF INSULIN: ICD-10-CM

## 2024-06-10 DIAGNOSIS — Z79.4 TYPE 2 DIABETES MELLITUS WITH STAGE 3A CHRONIC KIDNEY DISEASE, WITH LONG-TERM CURRENT USE OF INSULIN: ICD-10-CM

## 2024-06-10 DIAGNOSIS — Z00.00 ENCOUNTER FOR MEDICARE ANNUAL WELLNESS EXAM: Primary | ICD-10-CM

## 2024-06-10 DIAGNOSIS — E66.9 OBESITY, CLASS I, BMI 30-34.9: ICD-10-CM

## 2024-06-10 DIAGNOSIS — E11.69 HYPERLIPIDEMIA ASSOCIATED WITH TYPE 2 DIABETES MELLITUS: ICD-10-CM

## 2024-06-10 DIAGNOSIS — Z00.00 ENCOUNTER FOR PREVENTIVE HEALTH EXAMINATION: ICD-10-CM

## 2024-06-10 DIAGNOSIS — I70.0 ATHEROSCLEROSIS OF AORTA: ICD-10-CM

## 2024-06-10 DIAGNOSIS — E11.65 TYPE 2 DIABETES MELLITUS WITH HYPERGLYCEMIA, WITH LONG-TERM CURRENT USE OF INSULIN: Primary | ICD-10-CM

## 2024-06-10 DIAGNOSIS — E11.22 TYPE 2 DIABETES MELLITUS WITH STAGE 3A CHRONIC KIDNEY DISEASE, WITH LONG-TERM CURRENT USE OF INSULIN: ICD-10-CM

## 2024-06-10 DIAGNOSIS — E11.59 HYPERTENSION ASSOCIATED WITH DIABETES: ICD-10-CM

## 2024-06-10 DIAGNOSIS — I15.2 HYPERTENSION ASSOCIATED WITH DIABETES: ICD-10-CM

## 2024-06-10 DIAGNOSIS — N40.0 BENIGN PROSTATIC HYPERPLASIA, UNSPECIFIED WHETHER LOWER URINARY TRACT SYMPTOMS PRESENT: ICD-10-CM

## 2024-06-10 DIAGNOSIS — I10 HYPERTENSION, UNSPECIFIED TYPE: ICD-10-CM

## 2024-06-10 DIAGNOSIS — Z79.4 TYPE 2 DIABETES MELLITUS WITH HYPERGLYCEMIA, WITH LONG-TERM CURRENT USE OF INSULIN: Primary | ICD-10-CM

## 2024-06-10 DIAGNOSIS — H40.1133 PRIMARY OPEN ANGLE GLAUCOMA (POAG) OF BOTH EYES, SEVERE STAGE: ICD-10-CM

## 2024-06-10 PROBLEM — G45.9 TIA (TRANSIENT ISCHEMIC ATTACK): Status: RESOLVED | Noted: 2024-03-06 | Resolved: 2024-06-10

## 2024-06-10 PROBLEM — I16.9 HYPERTENSIVE CRISIS: Status: RESOLVED | Noted: 2019-11-12 | Resolved: 2024-06-10

## 2024-06-10 PROCEDURE — 1159F MED LIST DOCD IN RCRD: CPT | Mod: CPTII,S$GLB,, | Performed by: FAMILY MEDICINE

## 2024-06-10 PROCEDURE — 1126F AMNT PAIN NOTED NONE PRSNT: CPT | Mod: CPTII,S$GLB,, | Performed by: FAMILY MEDICINE

## 2024-06-10 PROCEDURE — 99999 PR PBB SHADOW E&M-EST. PATIENT-LVL IV: CPT | Mod: PBBFAC,,, | Performed by: FAMILY MEDICINE

## 2024-06-10 PROCEDURE — 3072F LOW RISK FOR RETINOPATHY: CPT | Mod: CPTII,S$GLB,, | Performed by: FAMILY MEDICINE

## 2024-06-10 PROCEDURE — 1160F RVW MEDS BY RX/DR IN RCRD: CPT | Mod: CPTII,S$GLB,, | Performed by: FAMILY MEDICINE

## 2024-06-10 PROCEDURE — 99214 OFFICE O/P EST MOD 30 MIN: CPT | Mod: S$GLB,,, | Performed by: FAMILY MEDICINE

## 2024-06-10 PROCEDURE — 1170F FXNL STATUS ASSESSED: CPT | Mod: CPTII,S$GLB,, | Performed by: NURSE PRACTITIONER

## 2024-06-10 PROCEDURE — 3078F DIAST BP <80 MM HG: CPT | Mod: CPTII,S$GLB,, | Performed by: FAMILY MEDICINE

## 2024-06-10 PROCEDURE — 3074F SYST BP LT 130 MM HG: CPT | Mod: CPTII,S$GLB,, | Performed by: FAMILY MEDICINE

## 2024-06-10 PROCEDURE — 1101F PT FALLS ASSESS-DOCD LE1/YR: CPT | Mod: CPTII,S$GLB,, | Performed by: FAMILY MEDICINE

## 2024-06-10 PROCEDURE — 3078F DIAST BP <80 MM HG: CPT | Mod: CPTII,S$GLB,, | Performed by: NURSE PRACTITIONER

## 2024-06-10 PROCEDURE — 3288F FALL RISK ASSESSMENT DOCD: CPT | Mod: CPTII,S$GLB,, | Performed by: NURSE PRACTITIONER

## 2024-06-10 PROCEDURE — 3288F FALL RISK ASSESSMENT DOCD: CPT | Mod: CPTII,S$GLB,, | Performed by: FAMILY MEDICINE

## 2024-06-10 PROCEDURE — 1158F ADVNC CARE PLAN TLK DOCD: CPT | Mod: CPTII,S$GLB,, | Performed by: NURSE PRACTITIONER

## 2024-06-10 PROCEDURE — 1126F AMNT PAIN NOTED NONE PRSNT: CPT | Mod: CPTII,S$GLB,, | Performed by: NURSE PRACTITIONER

## 2024-06-10 PROCEDURE — 3072F LOW RISK FOR RETINOPATHY: CPT | Mod: CPTII,S$GLB,, | Performed by: NURSE PRACTITIONER

## 2024-06-10 PROCEDURE — G0439 PPPS, SUBSEQ VISIT: HCPCS | Mod: S$GLB,,, | Performed by: NURSE PRACTITIONER

## 2024-06-10 PROCEDURE — 3074F SYST BP LT 130 MM HG: CPT | Mod: CPTII,S$GLB,, | Performed by: NURSE PRACTITIONER

## 2024-06-10 PROCEDURE — 1101F PT FALLS ASSESS-DOCD LE1/YR: CPT | Mod: CPTII,S$GLB,, | Performed by: NURSE PRACTITIONER

## 2024-06-10 RX ORDER — METFORMIN HYDROCHLORIDE 500 MG/1
500 TABLET, EXTENDED RELEASE ORAL
COMMUNITY

## 2024-06-10 RX ORDER — LOSARTAN POTASSIUM 25 MG/1
25 TABLET ORAL DAILY
Qty: 90 TABLET | Refills: 3 | Status: CANCELLED | OUTPATIENT
Start: 2024-06-10 | End: 2025-06-10

## 2024-06-10 RX ORDER — LISINOPRIL 20 MG/1
20 TABLET ORAL DAILY
COMMUNITY

## 2024-06-10 NOTE — PROGRESS NOTES
Subjective:       Patient ID: Evelio Pandya is a 84 y.o. male.    Chief Complaint: Follow-up (6m f/u)    Evelio Pandya presents to the clinic today for follow up. Patient states he has been well but has been getting fatigued and diaphoretic when he is outside recently. Patient is with his daughter today who reports he does not drink enough water during the day.   Patient educated on plan of care, verbalized understanding.         Review of Systems   Constitutional:  Negative for activity change, appetite change, chills, diaphoresis and fever.   HENT:  Negative for congestion, ear pain, postnasal drip, sinus pressure, sneezing and sore throat.    Eyes:  Negative for pain, discharge, redness and itching.   Respiratory:  Negative for apnea, cough, chest tightness, shortness of breath and wheezing.    Cardiovascular:  Negative for chest pain and leg swelling.   Gastrointestinal:  Negative for abdominal distention, abdominal pain, constipation, diarrhea, nausea and vomiting.   Genitourinary:  Negative for difficulty urinating, dysuria, flank pain and frequency.   Skin:  Negative for color change, rash and wound.   Neurological:  Negative for dizziness.       Patient Active Problem List   Diagnosis    Type 2 diabetes mellitus with hyperglycemia, with long-term current use of insulin    Hypertensive crisis    BPH (benign prostatic hyperplasia)    Primary open angle glaucoma (POAG) of both eyes, severe stage    Chronic kidney disease, stage 3 unspecified    Morbid (severe) obesity with alveolar hypoventilation    Carotid stenosis    Hypertensive encephalopathy    Atherosclerosis of aorta       Objective:      Physical Exam  Vitals reviewed.   Constitutional:       General: He is not in acute distress.     Appearance: Normal appearance. He is well-developed.   HENT:      Head: Normocephalic.      Nose: Nose normal.   Eyes:      Conjunctiva/sclera: Conjunctivae normal.      Pupils: Pupils are equal, round, and reactive to  "light.   Cardiovascular:      Rate and Rhythm: Normal rate and regular rhythm.      Heart sounds: Normal heart sounds.   Pulmonary:      Effort: Pulmonary effort is normal. No respiratory distress.      Breath sounds: Normal breath sounds.   Musculoskeletal:      Cervical back: Normal range of motion and neck supple.   Skin:     General: Skin is warm and dry.      Findings: No rash.   Neurological:      Mental Status: He is alert and oriented to person, place, and time.   Psychiatric:         Mood and Affect: Mood normal.         Behavior: Behavior normal.         Lab Results   Component Value Date    WBC 8.34 04/18/2024    HGB 10.6 (L) 04/18/2024    HCT 32.9 (L) 04/18/2024     04/18/2024    CHOL 112 (L) 03/06/2024    TRIG 88 03/06/2024    HDL 39 (L) 03/06/2024    ALT 48 (H) 03/26/2024    AST 29 03/26/2024     (L) 04/18/2024    K 4.7 04/18/2024     04/18/2024    CREATININE 1.1 04/18/2024    BUN 14 04/18/2024    CO2 23 04/18/2024    TSH 1.360 03/06/2024    INR 1.1 03/06/2024    HGBA1C 6.4 (H) 03/26/2024     The ASCVD Risk score (Steven DK, et al., 2019) failed to calculate for the following reasons:    The 2019 ASCVD risk score is only valid for ages 40 to 79  Visit Vitals  /74 (BP Location: Right arm, Patient Position: Sitting, BP Method: Medium (Manual))   Pulse 89   Resp 18   Ht 5' 6" (1.676 m)   Wt 89.4 kg (197 lb 1.5 oz)   SpO2 99%   BMI 31.81 kg/m²      Assessment:       1. Type 2 diabetes mellitus with hyperglycemia, with long-term current use of insulin    2. Atherosclerosis of aorta    3. Hypertension, unspecified type    4. Obesity, Class I, BMI 30-34.9        Plan:       Evelio was seen today for follow-up.    Diagnoses and all orders for this visit:    Type 2 diabetes mellitus with hyperglycemia, with long-term current use of insulin  Stable  Continue medications as prescribed.  Follow up with PCP     Atherosclerosis of aorta / Hypertension, unspecified type  Stable  Continue " medications as prescribed.  Follow up with PCP    Obesity, Class I, BMI 30-34.9  Body mass index is 31.81 kg/m².  Continue healthy diet and regular exercise as tolerated.  Continue medications as prescribed.  Follow up with PCP        Follow up if symptoms worsen or fail to improve.      Future Appointments       Date Provider Specialty Appt Notes    10/28/2024  Lab labs    11/12/2024 Yamil Gutierrez, DO Family Medicine 6 month f/u             Tests to Keep You Healthy    Eye Exam: ORDERED BUT NOT SCHEDULED  Last Blood Pressure <= 139/89 (6/10/2024): Yes

## 2024-06-10 NOTE — PATIENT INSTRUCTIONS
Counseling and Referral of Other Preventative  (Italic type indicates deductible and co-insurance are waived)    Patient Name: Evelio Pandya  Today's Date: 6/10/2024    Health Maintenance       Date Due Completion Date    Pneumococcal Vaccines (Age 65+) (1 of 2 - PCV) Never done ---    TETANUS VACCINE Never done ---    Shingles Vaccine (1 of 2) Never done ---    RSV Vaccine (Age 60+ and Pregnant patients) (1 - 1-dose 60+ series) Never done ---    COVID-19 Vaccine (3 - 2023-24 season) 09/01/2023 3/4/2021    Diabetes Urine Screening 12/30/2023 12/30/2022    Eye Exam 03/21/2024 3/21/2023    Influenza Vaccine (Season Ended) 09/01/2024 9/28/2022    Hemoglobin A1c 09/26/2024 3/26/2024    Lipid Panel 03/06/2025 3/6/2024    Aspirin/Antiplatelet Therapy 06/10/2025 6/10/2024        No orders of the defined types were placed in this encounter.      The following information is provided to all patients.  This information is to help you find resources for any of the problems found today that may be affecting your health:                  Living healthy guide: www.[facility].[place].gov      Understanding Diabetes: www.diabetes.org      Eating healthy: www.cdc.gov/healthyweight      CDC home safety checklist: www.cdc.gov/steadi/patient.html      Agency on Aging: www.goea.[place].[facility]      Alcoholics anonymous (AA): www.aa.org      Physical Activity: www.damir.nih.gov/fx7iaez      Tobacco use: www.quitwithusla.org

## 2024-06-10 NOTE — PATIENT INSTRUCTIONS
Ez Fraser,     If you are due for any health screening(s) below please notify me so we can arrange them to be ordered and scheduled to maintain your health. Most healthy patients complete it. Don't lose out on improving your health.     Tests to Keep You Healthy    Eye Exam: ORDERED BUT NOT SCHEDULED  Last Blood Pressure <= 139/89 (6/10/2024): Yes             Diabetic Retinal Eye Exam    Diabetes is the #1 cause of blindness in the [place] - early detection before signs or symptoms develop can prevent debilitating blindness.    Once-a-year screening is recommended for all diabetic patients. This exam can prevent and treat diabetes complications in the eye before developing symptoms. This can be done with a special camera is used to take photographs of the back of your eye without having to dilate them, or you can see an eye doctor for a full dilated exam.           Thank you for allowing me to be part of your healthcare team at [facility]. It is a pleasure to care for you today.   Please take all of your medications as instructed and follow all new instructions from your visit today.  If you received labs or medical tests today you should hear information about results or scheduling either by phone or mychart within approximately a week.   If you have any questions or concerns please do not hesitate to call. Have a blessed day and I hope to see you again soon.  Jazmín Hallman

## 2024-06-10 NOTE — PROGRESS NOTES
Evelio Pandya presented for a follow-up Medicare AWV today. The following components were reviewed and updated:    Medical history  Family History  Social history  Allergies and Current Medications  Health Risk Assessment  Health Maintenance  Care Team    **See Completed Assessments for Annual Wellness visit with in the encounter summary    The following assessments were completed:  Depression Screening  Cognitive function Screening    Timed Get Up Test  Whisper Test      Opioid documentation:      Patient does not have a current opioid prescription.          Vitals:    06/10/24 0951   BP: 116/76   Pulse: 88   SpO2: 100%   Weight: 88.5 kg (195 lb)     Body mass index is 31.47 kg/m².       Physical Exam  Constitutional:       Appearance: Normal appearance.   HENT:      Head: Normocephalic.      Ears:      Comments: Blind right eye     Nose: Nose normal.   Eyes:      Pupils: Pupils are equal, round, and reactive to light.   Cardiovascular:      Rate and Rhythm: Normal rate and regular rhythm.      Pulses: Normal pulses.      Heart sounds: Normal heart sounds.   Pulmonary:      Effort: Pulmonary effort is normal.   Abdominal:      General: Bowel sounds are normal.   Musculoskeletal:         General: Normal range of motion.      Cervical back: Normal range of motion.      Right lower leg: No edema.      Left lower leg: No edema.   Skin:     General: Skin is warm and dry.   Neurological:      Mental Status: He is alert and oriented to person, place, and time.           Diagnoses and health risks identified today and associated recommendations/orders:  1. Encounter for Medicare annual wellness exam  - Ambulatory Referral/Consult to Enhanced Annual Wellness Visit (eAWV)    2. Encounter for preventive health examination  - Above assessments completed. Preventive measures and health maintenance reviewed with patient.  -discussed overdue vaccines, can have done at any pharmacy    3. Type 2 diabetes mellitus with stage 3a  chronic kidney disease, with long-term current use of insulin  Stable, followed by PCP  -A1C 6.4, on metformin, insulins and semaglutide  -ckd stable, encouraged hydration and avoidance of NSAIDs  -encouraged yearly eye and podiatry exams    4. Atherosclerosis of aorta  Stable, followed by PCP  -on statin and asa    5. Hypertension associated with diabetes  Stable, followed by PCP  -on amlodipine, lisinopril, losartan and hydralazine    6. Hyperlipidemia associated with type 2 diabetes mellitus  Stable, followed by PCP  -on statin    7. Benign prostatic hyperplasia, unspecified whether lower urinary tract symptoms present  Stable, followed by PCP  -on tamsulosin    8. Primary open angle glaucoma (POAG) of both eyes, severe stage  Stable, followed by Ophthalmology  -3 eye drops     9. BMI 31.0-31.9,adult  -Discussed weight reduction strategies, including following ADA/heart healthy/low chol diet, reducing portion sizes, caloric intake and snacking.  -Discussed importance of engaging in physical activity at least 5x/week for a minimum of 30 min/day.      Provided Evelio with a 5-10 year written screening schedule and personal prevention plan. Recommendations were developed using the USPSTF age appropriate recommendations. Education, counseling, and referrals were provided as needed.  After Visit Summary printed and given to patient which includes a list of additional screenings\tests needed.    Follow up in about 1 year (around 6/10/2025) for your next annual wellness visit.      Carlyn Lake NP  I offered to discuss advanced care planning, including how to pick a person who would make decisions for you if you were unable to make them for yourself, called a health care power of , and what kind of decisions you might make such as use of life sustaining treatments such as ventilators and tube feeding when faced with a life limiting illness recorded on a living will that they will need to know. (How you want to  be cared for as you near the end of your natural life)     X Patient is interested in learning more about how to make advanced directives.  I provided them paperwork and offered to discuss this with them.

## 2024-06-19 RX ORDER — LOSARTAN POTASSIUM 50 MG/1
50 TABLET ORAL DAILY
Qty: 90 TABLET | Refills: 0 | Status: CANCELLED | OUTPATIENT
Start: 2024-06-19 | End: 2024-09-17

## 2024-06-19 RX ORDER — METFORMIN HYDROCHLORIDE 500 MG/1
500 TABLET, EXTENDED RELEASE ORAL
Status: CANCELLED | OUTPATIENT
Start: 2024-06-19

## 2024-06-19 RX ORDER — LISINOPRIL 20 MG/1
20 TABLET ORAL DAILY
Status: CANCELLED | OUTPATIENT
Start: 2024-06-19

## 2024-06-19 RX ORDER — ATORVASTATIN CALCIUM 80 MG/1
80 TABLET, FILM COATED ORAL DAILY
Qty: 90 TABLET | Refills: 1 | Status: CANCELLED | OUTPATIENT
Start: 2024-06-19

## 2024-06-19 NOTE — TELEPHONE ENCOUNTER
----- Message from Aj Wilson sent at 6/18/2024  2:21 PM CDT -----  Regarding: refill  Contact: [phone] / daughter  Type:  RX Refill Request    Who Called:  [place] / daughter  Refill or New Rx:  refill    RX Name and Strength:    1. losartan (COZAAR) 50 MG tablet 90 tablet 0 3/9/2024 6/10/2024   Sig - Route: Take 1 tablet (50 mg total) by mouth once daily. - Oral   Patient taking differently: Take 50 mg by mouth nightly.   Sent to pharmacy as: losartan (COZAAR) 50 MG tablet   Notes to Pharmacy: .   E-Prescribing Status: Receipt confirmed by pharmacy (3/8/2024 10:10 AM CST)     2. atorvastatin (LIPITOR) 80 MG tablet 90 tablet 1 10/25/2022 -   Sig - Route: Take 1 tablet (80 mg total) by mouth once daily. - Oral   Patient taking differently: Take 80 mg by mouth every evening.   Sent to pharmacy as: atorvastatin (LIPITOR) 80 MG tablet   E-Prescribing Status: Receipt confirmed by pharmacy (10/25/2022  2:33 PM CDT)     3. metFORMIN (GLUCOPHAGE-XR) 500 MG ER 24hr tablet - -  -   Sig - Route: Take 500 mg by mouth daily with breakfast. - Oral     4. lisinopriL (PRINIVIL,ZESTRIL) 20 MG tablet - -  -   Sig - Route: Take 20 mg by mouth once daily. - Oral     5. sinasperide (not in chart)    6. meloxicam (not in chart)    How is the patient currently taking it? (ex. 1XDay):  see above  Is this a 30 day or 90 day RX:  see above    Preferred Pharmacy with phone number:    [id number] [address] [place] [address] AT [facility] [id number] [facility] [address]  Phone: [phone] Fax: [phone]    Local or Mail Order:  local    Ordering Provider:  CORINNE Still and DEACON Nurse    Best Call Back Number:  [phone] / daughter    Additional Information:  please call daughter with med refill concerns.

## 2024-06-21 DIAGNOSIS — I10 HYPERTENSION, UNSPECIFIED TYPE: ICD-10-CM

## 2024-06-21 DIAGNOSIS — N40.0 BENIGN PROSTATIC HYPERPLASIA, UNSPECIFIED WHETHER LOWER URINARY TRACT SYMPTOMS PRESENT: ICD-10-CM

## 2024-06-21 DIAGNOSIS — E11.65 TYPE 2 DIABETES MELLITUS WITH HYPERGLYCEMIA, WITH LONG-TERM CURRENT USE OF INSULIN: Primary | ICD-10-CM

## 2024-06-21 DIAGNOSIS — Z79.4 TYPE 2 DIABETES MELLITUS WITH HYPERGLYCEMIA, WITH LONG-TERM CURRENT USE OF INSULIN: Primary | ICD-10-CM

## 2024-06-21 NOTE — TELEPHONE ENCOUNTER
Refill Routing Note   Medication(s) are not appropriate for processing by [facility] for the following reason(s):        Non-participating provider    [facility] action(s):  Route               Appointments  past 12m or future 3m with PCP    Date Provider   Last Visit   5/8/2024 Yamil Gutierrez, DO   Next Visit   11/12/2024 Yamil Gutierrez, DO   ED visits in past 90 days: 1        Note composed:6:53 PM 06/21/2024

## 2024-06-21 NOTE — TELEPHONE ENCOUNTER
----- Message from Danita Mayorga sent at 2024  1:51 PM CDT -----  Regarding: RX Refill Request  Contact: patient at [phone]  Type:  RX Refill Request    Who Called:  patient at [phone]    Preferred Pharmacy with phone number:      [id number] [address] & [address] [address]  Phone: [phone] Fax: [phone]      Additional Information:  daughter is requesting doctor to look over patient's medication list and get refills for patient. Please call and advise. Thank you    #amLODIPine (NORVASC) 10 MG tablet    #atorvastatin (LIPITOR) 80 MG tablet    #finasteride 5mg (not in chart)    #lisinopriL (PRINIVIL,ZESTRIL) 20 MG tablet    #losartan (COZAAR) 50 MG tablet ()    #metFORMIN (GLUCOPHAGE-XR) 500 MG ER 24hr tablet    #tamsulosin (FLOMAX) 0.4 mg Cap

## 2024-06-24 RX ORDER — AMLODIPINE BESYLATE 10 MG/1
10 TABLET ORAL NIGHTLY
Qty: 90 TABLET | Refills: 3 | Status: SHIPPED | OUTPATIENT
Start: 2024-06-24 | End: 2025-06-24

## 2024-06-24 RX ORDER — METFORMIN HYDROCHLORIDE 500 MG/1
500 TABLET, EXTENDED RELEASE ORAL
Qty: 90 TABLET | Refills: 3 | Status: SHIPPED | OUTPATIENT
Start: 2024-06-24

## 2024-06-24 RX ORDER — LOSARTAN POTASSIUM 50 MG/1
50 TABLET ORAL NIGHTLY
Qty: 90 TABLET | Refills: 3 | Status: SHIPPED | OUTPATIENT
Start: 2024-06-24 | End: 2025-06-24

## 2024-06-24 RX ORDER — LISINOPRIL 20 MG/1
20 TABLET ORAL DAILY
Qty: 90 TABLET | Refills: 3 | OUTPATIENT
Start: 2024-06-24

## 2024-06-24 RX ORDER — ATORVASTATIN CALCIUM 80 MG/1
80 TABLET, FILM COATED ORAL NIGHTLY
Qty: 90 TABLET | Refills: 3 | Status: SHIPPED | OUTPATIENT
Start: 2024-06-24

## 2024-06-24 RX ORDER — TAMSULOSIN HYDROCHLORIDE 0.4 MG/1
0.4 CAPSULE ORAL NIGHTLY
Qty: 90 CAPSULE | Refills: 3 | Status: SHIPPED | OUTPATIENT
Start: 2024-06-24 | End: 2025-06-25

## 2024-06-25 DIAGNOSIS — Z79.4 TYPE 2 DIABETES MELLITUS WITH HYPERGLYCEMIA, WITH LONG-TERM CURRENT USE OF INSULIN: Primary | ICD-10-CM

## 2024-06-25 DIAGNOSIS — E11.65 TYPE 2 DIABETES MELLITUS WITH HYPERGLYCEMIA, WITH LONG-TERM CURRENT USE OF INSULIN: Primary | ICD-10-CM

## 2024-06-25 RX ORDER — CALCIUM CITRATE/VITAMIN D3 200MG-6.25
TABLET ORAL
Qty: 100 EACH | Refills: 4 | Status: SHIPPED | OUTPATIENT
Start: 2024-06-25

## 2024-07-31 ENCOUNTER — OFFICE VISIT (OUTPATIENT)
Dept: FAMILY MEDICINE | Facility: CLINIC | Age: 84
End: 2024-07-31
Payer: MEDICARE

## 2024-07-31 VITALS
DIASTOLIC BLOOD PRESSURE: 74 MMHG | BODY MASS INDEX: 31.96 KG/M2 | SYSTOLIC BLOOD PRESSURE: 120 MMHG | RESPIRATION RATE: 16 BRPM | HEIGHT: 66 IN | OXYGEN SATURATION: 99 % | HEART RATE: 83 BPM | WEIGHT: 198.88 LBS

## 2024-07-31 DIAGNOSIS — J20.9 ACUTE BRONCHITIS, UNSPECIFIED ORGANISM: Primary | ICD-10-CM

## 2024-07-31 DIAGNOSIS — J30.1 NON-SEASONAL ALLERGIC RHINITIS DUE TO POLLEN: ICD-10-CM

## 2024-07-31 PROCEDURE — 99999 PR PBB SHADOW E&M-EST. PATIENT-LVL III: CPT | Mod: PBBFAC,HCNC,, | Performed by: FAMILY MEDICINE

## 2024-07-31 PROCEDURE — 3074F SYST BP LT 130 MM HG: CPT | Mod: HCNC,CPTII,S$GLB, | Performed by: FAMILY MEDICINE

## 2024-07-31 PROCEDURE — 99213 OFFICE O/P EST LOW 20 MIN: CPT | Mod: HCNC,S$GLB,, | Performed by: FAMILY MEDICINE

## 2024-07-31 PROCEDURE — 3288F FALL RISK ASSESSMENT DOCD: CPT | Mod: HCNC,CPTII,S$GLB, | Performed by: FAMILY MEDICINE

## 2024-07-31 PROCEDURE — 1159F MED LIST DOCD IN RCRD: CPT | Mod: HCNC,CPTII,S$GLB, | Performed by: FAMILY MEDICINE

## 2024-07-31 PROCEDURE — 3078F DIAST BP <80 MM HG: CPT | Mod: HCNC,CPTII,S$GLB, | Performed by: FAMILY MEDICINE

## 2024-07-31 PROCEDURE — 1101F PT FALLS ASSESS-DOCD LE1/YR: CPT | Mod: HCNC,CPTII,S$GLB, | Performed by: FAMILY MEDICINE

## 2024-07-31 PROCEDURE — 1160F RVW MEDS BY RX/DR IN RCRD: CPT | Mod: HCNC,CPTII,S$GLB, | Performed by: FAMILY MEDICINE

## 2024-07-31 PROCEDURE — 1126F AMNT PAIN NOTED NONE PRSNT: CPT | Mod: HCNC,CPTII,S$GLB, | Performed by: FAMILY MEDICINE

## 2024-07-31 RX ORDER — FLUTICASONE PROPIONATE 50 MCG
1 SPRAY, SUSPENSION (ML) NASAL 2 TIMES DAILY
Qty: 18.2 ML | Refills: 5 | Status: SHIPPED | OUTPATIENT
Start: 2024-07-31

## 2024-07-31 RX ORDER — LORATADINE 10 MG/1
10 TABLET ORAL DAILY
Qty: 30 TABLET | Refills: 11 | Status: SHIPPED | OUTPATIENT
Start: 2024-07-31 | End: 2025-07-31

## 2024-07-31 RX ORDER — DOXYCYCLINE 100 MG/1
100 CAPSULE ORAL 2 TIMES DAILY
Qty: 20 CAPSULE | Refills: 0 | Status: SHIPPED | OUTPATIENT
Start: 2024-07-31

## 2024-07-31 NOTE — PROGRESS NOTES
Subjective:       Patient ID: Evelio Pandya is a 84 y.o. male.    Chief Complaint: No chief complaint on file.    New to me patient here for UC visit.  1 month of cough; productive yellow mucous; no fever pl pain or SOB or HA's.  Also has chronic clear runny nose - year round.      Review of Systems   Constitutional:  Negative for fever.   HENT:  Positive for rhinorrhea.    Respiratory:  Positive for cough. Negative for shortness of breath.    Cardiovascular:  Negative for chest pain.   Gastrointestinal:  Negative for abdominal pain and nausea.   Skin:  Negative for rash.   Neurological:  Negative for numbness.   All other systems reviewed and are negative.      Objective:      Physical Exam  Constitutional:       General: He is not in acute distress.     Appearance: He is well-developed.   HENT:      Mouth/Throat:      Mouth: Mucous membranes are moist.      Pharynx: No posterior oropharyngeal erythema.   Cardiovascular:      Rate and Rhythm: Normal rate and regular rhythm.      Heart sounds: No murmur heard.  Pulmonary:      Effort: Pulmonary effort is normal.      Breath sounds: Examination of the right-lower field reveals rhonchi and rales. Examination of the left-lower field reveals rhonchi and rales. Rhonchi and rales present.   Musculoskeletal:      Cervical back: Neck supple.   Lymphadenopathy:      Cervical: No cervical adenopathy.   Skin:     General: Skin is warm and dry.         Assessment:       1. Acute bronchitis, unspecified organism    2. Non-seasonal allergic rhinitis due to pollen        Plan:       Acute bronchitis, unspecified organism  -     doxycycline (MONODOX) 100 MG capsule; Take 1 capsule (100 mg total) by mouth 2 (two) times daily.  Dispense: 20 capsule; Refill: 0    Non-seasonal allergic rhinitis due to pollen  -     loratadine (CLARITIN) 10 mg tablet; Take 1 tablet (10 mg total) by mouth once daily.  Dispense: 30 tablet; Refill: 11  -     fluticasone propionate (FLONASE) 50  mcg/actuation nasal spray; 1 spray (50 mcg total) by Each Nostril route 2 (two) times a day.  Dispense: 18.2 mL; Refill: 5      Patient Instructions   IF the Rx is not covered - look for Loratidine (Claritin ) - one a day for allergy.    Push fluids intake.  Drink plenty of water.     Contact me or your PCP if any worsening or for any new concerns as we discussed.

## 2024-10-07 DIAGNOSIS — Z79.4 TYPE 2 DIABETES MELLITUS WITH HYPERGLYCEMIA, WITH LONG-TERM CURRENT USE OF INSULIN: ICD-10-CM

## 2024-10-07 DIAGNOSIS — E11.65 TYPE 2 DIABETES MELLITUS WITH HYPERGLYCEMIA, WITH LONG-TERM CURRENT USE OF INSULIN: ICD-10-CM

## 2024-10-07 NOTE — TELEPHONE ENCOUNTER
Care Due:                  Date            Visit Type   Department     Provider  --------------------------------------------------------------------------------                                SAME DAY -                              ESTABLISHED   [facility]  Last Visit: 07-      PATIENT      MEDICINE       Adam Peters                              EP -                              PRIMARY      [facility]  Next Visit: 11-      CARE ([facility])   MEDICINE       Yamil Gutierrez                                                            Last  Test          Frequency    Reason                     Performed    Due Date  --------------------------------------------------------------------------------    HBA1C.......  6 months...  metFORMIN, semaglutide...  03- 09-    [facility] Embedded Care Due Messages. Reference number: [id number].   10/07/2024 4:07:20 PM CDT

## 2024-10-08 RX ORDER — SEMAGLUTIDE 1.34 MG/ML
INJECTION, SOLUTION SUBCUTANEOUS
Qty: 9 ML | Refills: 0 | Status: SHIPPED | OUTPATIENT
Start: 2024-10-08

## 2024-10-08 NOTE — TELEPHONE ENCOUNTER
Refill Routing Note   Medication(s) are not appropriate for processing by [facility] for the following reason(s):        Required labs outdated    [facility] action(s):  Defer      Medication Therapy Plan: FLOS      Appointments  past 12m or future 3m with PCP    Date Provider   Last Visit   5/8/2024 Yamil Gutierrez, DO   Next Visit   10/7/2024 Yamil Gutierrez, DO   ED visits in past 90 days: 0        Note composed:9:17 AM 10/08/2024

## 2024-10-24 RX ORDER — PEN NEEDLE, DIABETIC 31 GX5/16"
1 NEEDLE, DISPOSABLE MISCELLANEOUS 4 TIMES DAILY
Qty: 400 EACH | Refills: 6 | Status: SHIPPED | OUTPATIENT
Start: 2024-10-24 | End: 2025-04-22

## 2024-10-28 ENCOUNTER — TELEPHONE (OUTPATIENT)
Dept: FAMILY MEDICINE | Facility: CLINIC | Age: 84
End: 2024-10-28
Payer: MEDICARE

## 2024-11-03 DIAGNOSIS — Z79.4 TYPE 2 DIABETES MELLITUS WITH HYPERGLYCEMIA, WITH LONG-TERM CURRENT USE OF INSULIN: ICD-10-CM

## 2024-11-03 DIAGNOSIS — E11.65 TYPE 2 DIABETES MELLITUS WITH HYPERGLYCEMIA, WITH LONG-TERM CURRENT USE OF INSULIN: ICD-10-CM

## 2024-11-03 RX ORDER — SEMAGLUTIDE 1.34 MG/ML
INJECTION, SOLUTION SUBCUTANEOUS
Qty: 9 ML | Refills: 0 | Status: CANCELLED | OUTPATIENT
Start: 2024-11-03

## 2024-11-03 NOTE — TELEPHONE ENCOUNTER
No care due was identified.  Health [facility] Embedded Care Due Messages. Reference number: [id number].   11/03/2024 4:52:26 PM CST

## 2024-11-03 NOTE — TELEPHONE ENCOUNTER
No care due was identified.  Health [facility] Embedded Care Due Messages. Reference number: [id number].   11/03/2024 4:38:57 PM CST

## 2024-11-04 RX ORDER — SEMAGLUTIDE 1.34 MG/ML
1 INJECTION, SOLUTION SUBCUTANEOUS
Qty: 6 ML | Refills: 0 | Status: SHIPPED | OUTPATIENT
Start: 2024-11-04

## 2024-11-04 NOTE — TELEPHONE ENCOUNTER
Refill Routing Note   Medication(s) are not appropriate for processing by [facility] for the following reason(s):        Required labs outdated    [facility] action(s):  Defer             Appointments  past 12m or future 3m with PCP    Date Provider   Last Visit   5/8/2024 Yamil Gutierrez, DO   Next Visit   11/3/2024 Yamil Gutierrez, DO   ED visits in past 90 days: 0        Note composed:11:13 AM 11/04/2024

## 2024-11-12 ENCOUNTER — OFFICE VISIT (OUTPATIENT)
Dept: FAMILY MEDICINE | Facility: CLINIC | Age: 84
End: 2024-11-12
Payer: MEDICARE

## 2024-11-12 VITALS
DIASTOLIC BLOOD PRESSURE: 68 MMHG | SYSTOLIC BLOOD PRESSURE: 128 MMHG | OXYGEN SATURATION: 98 % | WEIGHT: 201.75 LBS | BODY MASS INDEX: 32.42 KG/M2 | HEIGHT: 66 IN | HEART RATE: 67 BPM

## 2024-11-12 DIAGNOSIS — E11.69 HYPERLIPIDEMIA ASSOCIATED WITH TYPE 2 DIABETES MELLITUS: ICD-10-CM

## 2024-11-12 DIAGNOSIS — N18.31 TYPE 2 DIABETES MELLITUS WITH STAGE 3A CHRONIC KIDNEY DISEASE, WITH LONG-TERM CURRENT USE OF INSULIN: Primary | ICD-10-CM

## 2024-11-12 DIAGNOSIS — E11.22 TYPE 2 DIABETES MELLITUS WITH STAGE 3A CHRONIC KIDNEY DISEASE, WITH LONG-TERM CURRENT USE OF INSULIN: Primary | ICD-10-CM

## 2024-11-12 DIAGNOSIS — J30.1 NON-SEASONAL ALLERGIC RHINITIS DUE TO POLLEN: ICD-10-CM

## 2024-11-12 DIAGNOSIS — E78.5 HYPERLIPIDEMIA ASSOCIATED WITH TYPE 2 DIABETES MELLITUS: ICD-10-CM

## 2024-11-12 DIAGNOSIS — E11.59 HYPERTENSION ASSOCIATED WITH DIABETES: ICD-10-CM

## 2024-11-12 DIAGNOSIS — Z79.4 TYPE 2 DIABETES MELLITUS WITH STAGE 3A CHRONIC KIDNEY DISEASE, WITH LONG-TERM CURRENT USE OF INSULIN: Primary | ICD-10-CM

## 2024-11-12 DIAGNOSIS — I15.2 HYPERTENSION ASSOCIATED WITH DIABETES: ICD-10-CM

## 2024-11-12 DIAGNOSIS — H40.1134 PRIMARY OPEN ANGLE GLAUCOMA (POAG) OF BOTH EYES, INDETERMINATE STAGE: ICD-10-CM

## 2024-11-12 DIAGNOSIS — Z23 IMMUNIZATION DUE: ICD-10-CM

## 2024-11-12 DIAGNOSIS — D64.9 ANEMIA, UNSPECIFIED TYPE: ICD-10-CM

## 2024-11-12 PROCEDURE — 1101F PT FALLS ASSESS-DOCD LE1/YR: CPT | Mod: HCNC,CPTII,S$GLB, | Performed by: STUDENT IN AN ORGANIZED HEALTH CARE EDUCATION/TRAINING PROGRAM

## 2024-11-12 PROCEDURE — 3288F FALL RISK ASSESSMENT DOCD: CPT | Mod: HCNC,CPTII,S$GLB, | Performed by: STUDENT IN AN ORGANIZED HEALTH CARE EDUCATION/TRAINING PROGRAM

## 2024-11-12 PROCEDURE — G2211 COMPLEX E/M VISIT ADD ON: HCPCS | Mod: HCNC,S$GLB,, | Performed by: STUDENT IN AN ORGANIZED HEALTH CARE EDUCATION/TRAINING PROGRAM

## 2024-11-12 PROCEDURE — 90653 IIV ADJUVANT VACCINE IM: CPT | Mod: HCNC,S$GLB,, | Performed by: STUDENT IN AN ORGANIZED HEALTH CARE EDUCATION/TRAINING PROGRAM

## 2024-11-12 PROCEDURE — 3078F DIAST BP <80 MM HG: CPT | Mod: HCNC,CPTII,S$GLB, | Performed by: STUDENT IN AN ORGANIZED HEALTH CARE EDUCATION/TRAINING PROGRAM

## 2024-11-12 PROCEDURE — 99214 OFFICE O/P EST MOD 30 MIN: CPT | Mod: HCNC,S$GLB,, | Performed by: STUDENT IN AN ORGANIZED HEALTH CARE EDUCATION/TRAINING PROGRAM

## 2024-11-12 PROCEDURE — 99999 PR PBB SHADOW E&M-EST. PATIENT-LVL IV: CPT | Mod: PBBFAC,HCNC,, | Performed by: STUDENT IN AN ORGANIZED HEALTH CARE EDUCATION/TRAINING PROGRAM

## 2024-11-12 PROCEDURE — 1126F AMNT PAIN NOTED NONE PRSNT: CPT | Mod: HCNC,CPTII,S$GLB, | Performed by: STUDENT IN AN ORGANIZED HEALTH CARE EDUCATION/TRAINING PROGRAM

## 2024-11-12 PROCEDURE — 3074F SYST BP LT 130 MM HG: CPT | Mod: HCNC,CPTII,S$GLB, | Performed by: STUDENT IN AN ORGANIZED HEALTH CARE EDUCATION/TRAINING PROGRAM

## 2024-11-12 PROCEDURE — 1159F MED LIST DOCD IN RCRD: CPT | Mod: HCNC,CPTII,S$GLB, | Performed by: STUDENT IN AN ORGANIZED HEALTH CARE EDUCATION/TRAINING PROGRAM

## 2024-11-12 PROCEDURE — G0008 ADMIN INFLUENZA VIRUS VAC: HCPCS | Mod: HCNC,S$GLB,, | Performed by: STUDENT IN AN ORGANIZED HEALTH CARE EDUCATION/TRAINING PROGRAM

## 2024-11-12 RX ORDER — ATORVASTATIN CALCIUM 80 MG/1
80 TABLET, FILM COATED ORAL NIGHTLY
Qty: 90 TABLET | Refills: 3 | Status: SHIPPED | OUTPATIENT
Start: 2024-11-12

## 2024-11-12 RX ORDER — INSULIN DETEMIR 100 [IU]/ML
15 INJECTION, SOLUTION SUBCUTANEOUS NIGHTLY
Qty: 15 ML | Refills: 2 | Status: SHIPPED | OUTPATIENT
Start: 2024-11-12 | End: 2025-11-12

## 2024-11-12 RX ORDER — INSULIN ASPART 100 [IU]/ML
8 INJECTION, SOLUTION INTRAVENOUS; SUBCUTANEOUS
Qty: 15 ML | Refills: 3 | Status: SHIPPED | OUTPATIENT
Start: 2024-11-12 | End: 2025-05-11

## 2024-11-12 RX ORDER — FLUTICASONE PROPIONATE 50 MCG
1 SPRAY, SUSPENSION (ML) NASAL 2 TIMES DAILY
Qty: 18.2 ML | Refills: 5 | Status: SHIPPED | OUTPATIENT
Start: 2024-11-12

## 2024-11-12 RX ORDER — LORATADINE 10 MG/1
10 TABLET ORAL DAILY
Qty: 30 TABLET | Refills: 11 | Status: SHIPPED | OUTPATIENT
Start: 2024-11-12 | End: 2025-11-12

## 2024-11-14 ENCOUNTER — TELEPHONE (OUTPATIENT)
Dept: OPHTHALMOLOGY | Facility: CLINIC | Age: 84
End: 2024-11-14
Payer: MEDICARE

## 2024-11-16 NOTE — PROGRESS NOTES
SUBJECTIVE:    CHIEF COMPLAINT:   Chief Complaint   Patient presents with    Follow-up     6m f/u           274}    HISTORY OF PRESENT ILLNESS:  History of Present Illness    CHIEF COMPLAINT:  Mr. Pandya presents for a follow-up visit to discuss medication management, particularly insulin regimen, and address ongoing health concerns.    HPI:  Mr. Pandya reports inconsistent adherence to his medication regimen, especially insulin. His daughter notes that he does not take his medicine as instructed. He uses both long-acting (Levemir) and short-acting (NovoLog) insulin. He has been inconsistent with Levemir dosing, sometimes taking 12 units instead of the prescribed 15 units at night. The NovoLog is prescribed 3 times daily, but he has been using it only twice daily.    Mr. Pandya reports blurred vision and feels his vision is deteriorating, with sight in only one eye. He has a history of glaucoma and uses eye drops but canceled his last eye doctor appointment and has not had an ophthalmology evaluation in some time.    He is using Ozempic, which is helping with cravings, but his daughter reports he sometimes does not eat adequately as a result. Mr. Pandya occasionally has coughing.    The daughter mentions that his blood sugar tends to be elevated in the mornings, possibly related to inconsistent insulin use. She also notes that the patient's blood sugar control worsens when he visits his girlfriend's mother.      ROS:  General: -fever, -chills, -fatigue, -weight gain, -weight loss  Eyes: -vision changes, -redness, -discharge  ENT: -ear pain, -nasal congestion, -sore throat  Cardiovascular: -chest pain, -palpitations, -lower extremity edema  Respiratory: +cough, -shortness of breath  Gastrointestinal: -abdominal pain, -nausea, -vomiting, -diarrhea, -constipation, -blood in stool  Genitourinary: -dysuria, -hematuria, -frequency  Musculoskeletal: -joint pain, -muscle pain  Skin: -rash, -lesion  Neurological:  -headache, -dizziness, -numbness, -tingling  Psychiatric: -anxiety, -depression, -sleep difficulty           PAST MEDICAL HISTORY:     274}  Past Medical History:   Diagnosis Date    Diabetes mellitus     Hypertension     Hypertensive crisis 11/12/2019    TIA (transient ischemic attack)     Urinary retention        PAST SURGICAL HISTORY:  Past Surgical History:   Procedure Laterality Date    CATARACT EXTRACTION Right     GLAUCOMA SURGERY Right        SOCIAL HISTORY:  Social History     Socioeconomic History    Marital status:    Tobacco Use    Smoking status: Never    Smokeless tobacco: Never   Substance and Sexual Activity    Alcohol use: Not Currently    Sexual activity: Not Currently     Social Drivers of Health     Financial Resource Strain: Low Risk  (6/10/2024)    Overall Financial Resource Strain (CARDIA)     Difficulty of Paying Living Expenses: Not hard at all   Recent Concern: Financial Resource Strain - Medium Risk (4/14/2024)    Overall Financial Resource Strain (CARDIA)     Difficulty of Paying Living Expenses: Somewhat hard   Food Insecurity: No Food Insecurity (6/10/2024)    Hunger Vital Sign     Worried About Running Out of Food in the Last Year: Never true     Ran Out of Food in the Last Year: Never true   Recent Concern: Food Insecurity - Food Insecurity Present (4/14/2024)    Hunger Vital Sign     Worried About Running Out of Food in the Last Year: Sometimes true     Ran Out of Food in the Last Year: Never true   Transportation Needs: No Transportation Needs (6/10/2024)    PRAPARE - Transportation     Lack of Transportation (Medical): No     Lack of Transportation (Non-Medical): No   Physical Activity: Inactive (6/10/2024)    Exercise Vital Sign     Days of Exercise per Week: 0 days     Minutes of Exercise per Session: 0 min   Stress: No Stress Concern Present (6/10/2024)    [employer] - Occupational Stress Questionnaire     Feeling of Stress : Not at all  "  Housing Stability: Low Risk  (6/10/2024)    Housing Stability Vital Sign     Unable to Pay for Housing in the Last Year: No     Homeless in the Last Year: No       FAMILY HISTORY:       Family History   Family history unknown: Yes       ALLERGIES AND MEDICATIONS: updated and reviewed.      274}  Review of patient's allergies indicates:  No Known Allergies  Medication List with Changes/Refills   Current Medications    AMLODIPINE (NORVASC) 10 MG TABLET    Take 1 tablet (10 mg total) by mouth every evening. For Hypertension    ASPIRIN (ECOTRIN) 81 MG EC TABLET    Take 81 mg by mouth every evening.    BD ULTRA-FINE SHORT PEN NEEDLE 31 GAUGE X 5/16" NDLE    Inject 1 each into the skin 4 (four) times daily. INJECT INSULIN FOUR TIMES DAILY    BRIMONIDINE 0.2% (ALPHAGAN) 0.2 % DROP    Place 1 drop into both eyes every 12 (twelve) hours.    CLOPIDOGREL (PLAVIX) 75 MG TABLET    TAKE 1 TABLET(75 MG) BY MOUTH EVERY DAY    DORZOLAMIDE-TIMOLOL 2-0.5% (COSOPT) 22.3-6.8 MG/ML OPHTHALMIC SOLUTION    Place 1 drop into both eyes every 12 (twelve) hours.    GABAPENTIN (NEURONTIN) 300 MG CAPSULE    Take 300 mg by mouth every evening.    LOSARTAN (COZAAR) 50 MG TABLET    Take 1 tablet (50 mg total) by mouth nightly.    METFORMIN (GLUCOPHAGE-XR) 500 MG ER 24HR TABLET    Take 1 tablet (500 mg total) by mouth daily with breakfast.    SEMAGLUTIDE (OZEMPIC) 1 MG/DOSE (4 MG/3 ML)    Inject 1 mg into the skin every 7 days.    TAMSULOSIN (FLOMAX) 0.4 MG CAP    Take 1 capsule (0.4 mg total) by mouth every evening.    TRAVOPROST (TRAVATAN Z) 0.004 % OPHTHALMIC SOLUTION    Place 1 drop into both eyes every evening.    TRUE METRIX GLUCOSE TEST STRIP STRP    3 (three) times daily. Use to test blood glucose, with glucometer    TRUEPLUS LANCETS 33 GAUGE MISC       Changed and/or Refilled Medications    Modified Medication Previous Medication    ATORVASTATIN (LIPITOR) 80 MG TABLET atorvastatin (LIPITOR) 80 MG tablet       Take 1 tablet (80 mg total) " by mouth every evening.    Take 1 tablet (80 mg total) by mouth every evening.    FLUTICASONE PROPIONATE (FLONASE) 50 MCG/ACTUATION NASAL SPRAY fluticasone propionate (FLONASE) 50 mcg/actuation nasal spray       1 spray (50 mcg total) by Each Nostril route 2 (two) times a day.    1 spray (50 mcg total) by Each Nostril route 2 (two) times a day.    INSULIN DETEMIR U-100, LEVEMIR, (LEVEMIR FLEXPEN) 100 UNIT/ML (3 ML) INPN PEN insulin detemir U-100, Levemir, (LEVEMIR FLEXPEN) 100 unit/mL (3 mL) InPn pen       Inject 15 Units into the skin every evening.    Inject 15 Units into the skin every evening.    LORATADINE (CLARITIN) 10 MG TABLET loratadine (CLARITIN) 10 mg tablet       Take 1 tablet (10 mg total) by mouth once daily.    Take 1 tablet (10 mg total) by mouth once daily.    NOVOLOG FLEXPEN U-100 INSULIN 100 UNIT/ML (3 ML) INPN PEN NOVOLOG FLEXPEN U-100 INSULIN 100 unit/mL (3 mL) InPn pen       Inject 8 Units into the skin 3 (three) times daily with meals. SMARTSI Unit(s) SUB-Q 3 Times Daily    SMARTSI Unit(s) SUB-Q 3 Times Daily   Discontinued Medications    DOXYCYCLINE (MONODOX) 100 MG CAPSULE    Take 1 capsule (100 mg total) by mouth 2 (two) times daily.    HYDRALAZINE (APRESOLINE) 50 MG TABLET    Take 1 tablet (50 mg total) by mouth every 8 (eight) hours as needed (for SBP > 160).       SCREENING HISTORY:    274}  Health Maintenance         Date Due Completion Date    Pneumococcal Vaccines (Age 65+) (1 of 2 - PCV) Never done ---    TETANUS VACCINE Never done ---    Shingles Vaccine (1 of 2) Never done ---    RSV Vaccine (Age 60+ and Pregnant patients) (1 - 1-dose 75+ series) Never done ---    Diabetes Urine Screening 2023    Eye Exam 2024 3/21/2023    COVID-19 Vaccine (2024- season) 2024    Hemoglobin A1c 2024 3/26/2024    Lipid Panel 2025 3/6/2024    Aspirin/Antiplatelet Therapy 2025                  PHYSICAL EXAM:      274}  BP  "128/68 (BP Location: Right arm, Patient Position: Sitting)   Pulse 67   Ht 5' 6" (1.676 m)   Wt 91.5 kg (201 lb 11.5 oz)   SpO2 98%   BMI 32.56 kg/m²   Wt Readings from Last 3 Encounters:   11/12/24 91.5 kg (201 lb 11.5 oz)   07/31/24 90.2 kg (198 lb 13.7 oz)   06/10/24 89.4 kg (197 lb 1.5 oz)     BP Readings from Last 3 Encounters:   11/12/24 128/68   07/31/24 120/74   06/10/24 110/74     Estimated body mass index is 32.56 kg/m² as calculated from the following:    Height as of this encounter: 5' 6" (1.676 m).    Weight as of this encounter: 91.5 kg (201 lb 11.5 oz).       Physical Exam    General: No acute distress. Well-developed. Well-nourished.  Eyes: EOMI. Sclerae anicteric.+low visual Acuity L>R  HENT: Normocephalic. Atraumatic. Nares patent. Moist oral mucosa.  Cardiovascular: Regular rate. Regular rhythm. No murmurs. No rubs. No gallops. Normal S1, S2.  Respiratory: Normal respiratory effort. Clear to auscultation bilaterally. No rales. No rhonchi. No wheezing. Small crackle in lungs.  Abdomen: Soft. Non-tender. Non-distended. Normoactive bowel sounds.  Musculoskeletal: No  obvious deformity.  Extremities: No lower extremity edema.  Neurological: Alert & oriented x3. No slurred speech. Normal gait.  Psychiatric: Normal mood. Normal affect. Good insight. Good judgment.  Skin: Warm. Dry. No rash.  TEST RESULTS:  Mr. Pandya underwent A1C and kidney function tests in April. An anemia test conducted in April indicated that the patient was anemic.         LABS:   274}  I have reviewed old labs below:  Lab Results   Component Value Date    WBC 8.34 04/18/2024    HGB 10.6 (L) 04/18/2024    HCT 32.9 (L) 04/18/2024    MCV 99 (H) 04/18/2024     04/18/2024     (L) 04/18/2024    K 4.7 04/18/2024     04/18/2024    CALCIUM 9.1 04/18/2024    CO2 23 04/18/2024     (H) 04/18/2024    BUN 14 04/18/2024    CREATININE 1.1 04/18/2024    ANIONGAP 6 (L) 04/18/2024    PROT 8.0 03/26/2024    ALBUMIN " 3.2 (L) 2024    BILITOT 0.5 2024    ALKPHOS 104 2024    ALT 48 (H) 2024    AST 29 2024    INR 1.1 2024    CHOL 112 (L) 2024    TRIG 88 2024    HDL 39 (L) 2024    LDLCALC 55.4 (L) 2024    TSH 1.360 2024    HGBA1C 6.4 (H) 2024       ASSESSMENT AND PLAN:  274}  1. Type 2 diabetes mellitus with stage 3a chronic kidney disease, with long-term current use of insulin  -     insulin detemir U-100, Levemir, (LEVEMIR FLEXPEN) 100 unit/mL (3 mL) InPn pen; Inject 15 Units into the skin every evening.  Dispense: 15 mL; Refill: 2  -     NOVOLOG FLEXPEN U-100 INSULIN 100 unit/mL (3 mL) InPn pen; Inject 8 Units into the skin 3 (three) times daily with meals. SMARTSI Unit(s) SUB-Q 3 Times Daily  Dispense: 15 mL; Refill: 3  -     Hemoglobin A1C; Future; Expected date: 2024  -     Comprehensive Metabolic Panel; Future; Expected date: 2024  -     Microalbumin/Creatinine Ratio, Urine; Future    2. Hypertension associated with diabetes  Comments:  BP Controlled. cont Amlodipine 10mg PO daily as prescribed.    3. Non-seasonal allergic rhinitis due to pollen  -     loratadine (CLARITIN) 10 mg tablet; Take 1 tablet (10 mg total) by mouth once daily.  Dispense: 30 tablet; Refill: 11  -     fluticasone propionate (FLONASE) 50 mcg/actuation nasal spray; 1 spray (50 mcg total) by Each Nostril route 2 (two) times a day.  Dispense: 18.2 mL; Refill: 5    4. Hyperlipidemia associated with type 2 diabetes mellitus  -     atorvastatin (LIPITOR) 80 MG tablet; Take 1 tablet (80 mg total) by mouth every evening.  Dispense: 90 tablet; Refill: 3  -     Lipid Panel; Future; Expected date: 2024    5. Immunization due  -     influenza (adjuvanted) (Fluad) 45 mcg/0.5 mL IM vaccine (> or = 64 yo) 0.5 mL    6. Primary open angle glaucoma (POAG) of both eyes, indeterminate stage  Comments:  Right (Severe), Left Moderate. Recommend to c/w eye gtts. Recommend setting appt  for followup with Opthal  Overview:  Right eye blindness.     Orders:  -     Ambulatory referral/consult to Ophthalmology; Future; Expected date: 11/13/2024    7. Anemia, unspecified type  -     CBC Without Differential; Future; Expected date: 11/12/2024         Assessment & Plan    IMPRESSION:   Evaluated medication adherence and diabetes management   Assessed current insulin regimen, noting issues with sliding scale approach   Considered changes to insulin prescription for improved consistency and efficacy   Evaluated efficacy of Ozempic for appetite control   Assessed eye health, noting concerns about glaucoma progression   Considered need for updated ophthalmology assessment due to reported vision changes and history of glaucoma   Performed brief respiratory exam, noting possible small crackle    DIABETES:   Explained importance of consistent insulin administration for blood sugar control.   Discussed how different types of insulin (long-acting vs. short-acting) work in the body.   Changed Novolog to 8 units 3 times daily with meals, moving away from sliding scale.   Continued Levemir 15 units nightly.   Ordered CBC, cholesterol panel, A1C, and urinalysis for protein.    WEIGHT MANAGEMENT:   Educated on the importance of regular meals while on Ozempic to prevent excessive weight loss.   Mr. Pandya to eat regular meals or snacks, even when not feeling hungry due to Ozempic.   Mr. Pandya to maintain consistent eating habits when away from home.   Continued Ozempic for appetite control.    GLAUCOMA:   Emphasized the importance of regular eye check-ups for glaucoma management.   Referred to ophthalmology for glaucoma follow-up and pressure check.    MEDICATION ADHERENCE:   Mr. Pandya to use pill organizer to improve medication adherence.   Mr. Pandya to check pill organizer morning and evening to ensure all medications are taken.    NEUROPATHY:   Continued gabapentin for numbness and tingling.    ALLERGIES:    Refilled Claritin and Flonase for allergies.    HYPERLIPIDEMIA:   Refilled Lipitor.    FLU VACCINATION:   Flu shot administered in office.    LABS:   Complete ordered lab work within 1 week.    FOLLOW UP:   Follow up in 6 months.   Contact the office if cough worsens or new respiratory symptoms develop.         Orders Placed This Encounter   Procedures    Lipid Panel    Hemoglobin A1C    Comprehensive Metabolic Panel    CBC Without Differential    Microalbumin/Creatinine Ratio, Urine    Ambulatory referral/consult to Ophthalmology         This note was generated with the assistance of ambient listening technology. Verbal consent was obtained by the patient and accompanying visitor(s) for the recording of patient appointment to facilitate this note. I attest to having reviewed and edited the generated note for accuracy, though some syntax or spelling errors may persist. Please contact the author of this note for any clarification.

## 2024-11-18 ENCOUNTER — LAB VISIT (OUTPATIENT)
Dept: LAB | Facility: HOSPITAL | Age: 84
End: 2024-11-18
Attending: STUDENT IN AN ORGANIZED HEALTH CARE EDUCATION/TRAINING PROGRAM
Payer: MEDICARE

## 2024-11-18 DIAGNOSIS — Z79.4 TYPE 2 DIABETES MELLITUS WITH STAGE 3A CHRONIC KIDNEY DISEASE, WITH LONG-TERM CURRENT USE OF INSULIN: ICD-10-CM

## 2024-11-18 DIAGNOSIS — E11.22 TYPE 2 DIABETES MELLITUS WITH STAGE 3A CHRONIC KIDNEY DISEASE, WITH LONG-TERM CURRENT USE OF INSULIN: ICD-10-CM

## 2024-11-18 DIAGNOSIS — N18.31 TYPE 2 DIABETES MELLITUS WITH STAGE 3A CHRONIC KIDNEY DISEASE, WITH LONG-TERM CURRENT USE OF INSULIN: ICD-10-CM

## 2024-11-18 LAB
ALBUMIN/CREAT UR: 24.8 UG/MG (ref 0–30)
CREAT UR-MCNC: 105 MG/DL (ref 23–375)
MICROALBUMIN UR DL<=1MG/L-MCNC: 26 UG/ML

## 2024-11-18 PROCEDURE — 82043 UR ALBUMIN QUANTITATIVE: CPT | Mod: HCNC | Performed by: STUDENT IN AN ORGANIZED HEALTH CARE EDUCATION/TRAINING PROGRAM

## 2024-11-19 ENCOUNTER — OFFICE VISIT (OUTPATIENT)
Dept: OPHTHALMOLOGY | Facility: CLINIC | Age: 84
End: 2024-11-19
Payer: MEDICARE

## 2024-11-19 DIAGNOSIS — H40.1122 PRIMARY OPEN-ANGLE GLAUCOMA, LEFT EYE, MODERATE STAGE: ICD-10-CM

## 2024-11-19 DIAGNOSIS — E11.9 DIABETES MELLITUS TYPE 2 WITHOUT RETINOPATHY: ICD-10-CM

## 2024-11-19 DIAGNOSIS — H25.812 COMBINED FORM OF AGE-RELATED CATARACT, LEFT EYE: ICD-10-CM

## 2024-11-19 DIAGNOSIS — H40.1113 PRIMARY OPEN-ANGLE GLAUCOMA, RIGHT EYE, SEVERE STAGE: Primary | ICD-10-CM

## 2024-11-19 PROCEDURE — 3288F FALL RISK ASSESSMENT DOCD: CPT | Mod: HCNC,CPTII,S$GLB, | Performed by: OPHTHALMOLOGY

## 2024-11-19 PROCEDURE — 99214 OFFICE O/P EST MOD 30 MIN: CPT | Mod: HCNC,S$GLB,, | Performed by: OPHTHALMOLOGY

## 2024-11-19 PROCEDURE — 1160F RVW MEDS BY RX/DR IN RCRD: CPT | Mod: HCNC,CPTII,S$GLB, | Performed by: OPHTHALMOLOGY

## 2024-11-19 PROCEDURE — 1159F MED LIST DOCD IN RCRD: CPT | Mod: HCNC,CPTII,S$GLB, | Performed by: OPHTHALMOLOGY

## 2024-11-19 PROCEDURE — G2211 COMPLEX E/M VISIT ADD ON: HCPCS | Mod: HCNC,S$GLB,, | Performed by: OPHTHALMOLOGY

## 2024-11-19 PROCEDURE — 1101F PT FALLS ASSESS-DOCD LE1/YR: CPT | Mod: HCNC,CPTII,S$GLB, | Performed by: OPHTHALMOLOGY

## 2024-11-19 PROCEDURE — 1126F AMNT PAIN NOTED NONE PRSNT: CPT | Mod: HCNC,CPTII,S$GLB, | Performed by: OPHTHALMOLOGY

## 2024-11-19 PROCEDURE — 2023F DILAT RTA XM W/O RTNOPTHY: CPT | Mod: HCNC,CPTII,S$GLB, | Performed by: OPHTHALMOLOGY

## 2024-11-19 PROCEDURE — 92133 CPTRZD OPH DX IMG PST SGM ON: CPT | Mod: HCNC,S$GLB,, | Performed by: OPHTHALMOLOGY

## 2024-11-19 PROCEDURE — 99999 PR PBB SHADOW E&M-EST. PATIENT-LVL III: CPT | Mod: PBBFAC,HCNC,, | Performed by: OPHTHALMOLOGY

## 2024-11-19 RX ORDER — BRIMONIDINE TARTRATE 2 MG/ML
1 SOLUTION/ DROPS OPHTHALMIC EVERY 12 HOURS
Qty: 30 ML | Refills: 3 | Status: SHIPPED | OUTPATIENT
Start: 2024-11-19

## 2024-11-19 RX ORDER — TRAVOPROST OPHTHALMIC SOLUTION 0.04 MG/ML
1 SOLUTION OPHTHALMIC NIGHTLY
Qty: 7.5 ML | Refills: 3 | Status: SHIPPED | OUTPATIENT
Start: 2024-11-19

## 2024-11-19 RX ORDER — DORZOLAMIDE HYDROCHLORIDE AND TIMOLOL MALEATE 20; 5 MG/ML; MG/ML
1 SOLUTION/ DROPS OPHTHALMIC EVERY 12 HOURS
Qty: 30 ML | Refills: 3 | Status: SHIPPED | OUTPATIENT
Start: 2024-11-19

## 2024-11-19 NOTE — PROGRESS NOTES
HPI    DLS: 12/7/2023 LTFU 5/2024 IOP/ HVF     Pt states feels like he has lost vision OS. States no other complaints. DM   stable.     Denies pain/ FOL/ floaters.     Gtts:   Travoprost OU QHS   Brimonidine OU BID   Cosopt OU BID     Hemoglobin A1C       Date                     Value               Ref Range             Status                11/18/2024               6.0 (H)             4.0 - 5.6 %           Final                   03/26/2024               6.4 (H)             4.0 - 5.6 %           Final                   02/09/2024               6.2 (H)             4.0 - 5.6 %           Final                Last edited by Yenifer Haider on 11/19/2024  8:26 AM.            Assessment /Plan     For exam results, see Encounter Report.    Primary open-angle glaucoma, right eye, severe stage  Comments:  refill  Orders:  -     travoprost (TRAVATAN Z) 0.004 % ophthalmic solution; Place 1 drop into both eyes every evening.  Dispense: 7.5 mL; Refill: 3  -     dorzolamide-timolol 2-0.5% (COSOPT) 22.3-6.8 mg/mL ophthalmic solution; Place 1 drop into both eyes every 12 (twelve) hours.  Dispense: 30 mL; Refill: 3  -     brimonidine 0.2% (ALPHAGAN) 0.2 % Drop; Place 1 drop into both eyes every 12 (twelve) hours.  Dispense: 30 mL; Refill: 3    Primary open-angle glaucoma, left eye, moderate stage  Comments:  Refill  Orders:  -     travoprost (TRAVATAN Z) 0.004 % ophthalmic solution; Place 1 drop into both eyes every evening.  Dispense: 7.5 mL; Refill: 3  -     dorzolamide-timolol 2-0.5% (COSOPT) 22.3-6.8 mg/mL ophthalmic solution; Place 1 drop into both eyes every 12 (twelve) hours.  Dispense: 30 mL; Refill: 3  -     brimonidine 0.2% (ALPHAGAN) 0.2 % Drop; Place 1 drop into both eyes every 12 (twelve) hours.  Dispense: 30 mL; Refill: 3    Combined form of age-related cataract, left eye    Diabetes mellitus type 2 without retinopathy      1. Primary open-angle glaucoma, right eye, severe stage (Primary)  2. Primary open-angle  glaucoma, left eye, moderate stage  +famhx (mother)  Thin pachy    HM vision OD, s/p tube shunt  OCT NFL damaged OD>OS, stable OS  Moderate HVF damage OS  Tmax unknown    IOP excellent    continue  Brim BID OU  Dorz/viv bid OU  [place] qhs OU    Visit today is associated with current or anticipated ongoing medical care related to this patients single serious and complex condition, glaucoma.      3. Combined form of age-related cataract, left eye  Patient with visually significant cataract impacting abiliity ADLs (reading, driving, PM driving, glare).  Discussed options, R & B, expectations, patient voices good understanding and wishes to proceed with procedure. Patient will likely benefit from sx.    Schedule CEIOL OS  Monofocal dist IOL  Will need clearance  No MIGS as glaucoma well controlled    RTC preop    4. Diabetes mellitus type 2 without retinopathy  Diabetes without retinopathy, discussed with patient importance of glucose control and follow up.  Patient voices understanding.

## 2025-01-02 DIAGNOSIS — Z79.4 TYPE 2 DIABETES MELLITUS WITH HYPERGLYCEMIA, WITH LONG-TERM CURRENT USE OF INSULIN: ICD-10-CM

## 2025-01-02 DIAGNOSIS — E11.65 TYPE 2 DIABETES MELLITUS WITH HYPERGLYCEMIA, WITH LONG-TERM CURRENT USE OF INSULIN: ICD-10-CM

## 2025-01-03 RX ORDER — SEMAGLUTIDE 1.34 MG/ML
1 INJECTION, SOLUTION SUBCUTANEOUS
Qty: 9 ML | Refills: 1 | Status: SHIPPED | OUTPATIENT
Start: 2025-01-03

## 2025-01-03 NOTE — TELEPHONE ENCOUNTER
No care due was identified.  Health [facility] Embedded Care Due Messages. Reference number: [id number].   1/02/2025 8:52:56 PM CST

## 2025-01-04 NOTE — TELEPHONE ENCOUNTER
Refill Decision Note   Evelio Mumtaz  is requesting a refill authorization.    Brief Assessment and Rationale for Refill:   Approve       Medication Therapy Plan:         Comments:     Note composed:6:03 PM 01/03/2025

## 2025-01-06 DIAGNOSIS — Z79.4 TYPE 2 DIABETES MELLITUS WITH STAGE 3A CHRONIC KIDNEY DISEASE, WITH LONG-TERM CURRENT USE OF INSULIN: ICD-10-CM

## 2025-01-06 DIAGNOSIS — N18.31 TYPE 2 DIABETES MELLITUS WITH STAGE 3A CHRONIC KIDNEY DISEASE, WITH LONG-TERM CURRENT USE OF INSULIN: ICD-10-CM

## 2025-01-06 DIAGNOSIS — E11.22 TYPE 2 DIABETES MELLITUS WITH STAGE 3A CHRONIC KIDNEY DISEASE, WITH LONG-TERM CURRENT USE OF INSULIN: ICD-10-CM

## 2025-01-06 RX ORDER — INSULIN ASPART 100 [IU]/ML
10 INJECTION, SOLUTION INTRAVENOUS; SUBCUTANEOUS
Qty: 15 ML | Refills: 3 | Status: SHIPPED | OUTPATIENT
Start: 2025-01-06 | End: 2025-07-05

## 2025-01-06 RX ORDER — PEN NEEDLE, DIABETIC 31 GX5/16"
1 NEEDLE, DISPOSABLE MISCELLANEOUS 4 TIMES DAILY
Qty: 400 EACH | Refills: 6 | Status: SHIPPED | OUTPATIENT
Start: 2025-01-06 | End: 2025-07-05

## 2025-01-14 DIAGNOSIS — Z00.00 ENCOUNTER FOR MEDICARE ANNUAL WELLNESS EXAM: ICD-10-CM

## 2025-02-26 ENCOUNTER — OFFICE VISIT (OUTPATIENT)
Dept: FAMILY MEDICINE | Facility: CLINIC | Age: 85
End: 2025-02-26
Payer: MEDICARE

## 2025-02-26 VITALS
HEART RATE: 75 BPM | SYSTOLIC BLOOD PRESSURE: 130 MMHG | DIASTOLIC BLOOD PRESSURE: 70 MMHG | TEMPERATURE: 97 F | WEIGHT: 213.63 LBS | OXYGEN SATURATION: 99 % | HEIGHT: 66 IN | BODY MASS INDEX: 34.33 KG/M2

## 2025-02-26 DIAGNOSIS — E11.59 HYPERTENSION ASSOCIATED WITH DIABETES: ICD-10-CM

## 2025-02-26 DIAGNOSIS — I15.2 HYPERTENSION ASSOCIATED WITH DIABETES: ICD-10-CM

## 2025-02-26 DIAGNOSIS — L24.9 IRRITANT CONTACT DERMATITIS, UNSPECIFIED TRIGGER: Primary | ICD-10-CM

## 2025-02-26 DIAGNOSIS — R21 RASH: ICD-10-CM

## 2025-02-26 PROCEDURE — 99999 PR PBB SHADOW E&M-EST. PATIENT-LVL V: CPT | Mod: PBBFAC,HCNC,,

## 2025-02-26 RX ORDER — TRIAMCINOLONE ACETONIDE 1 MG/G
OINTMENT TOPICAL 2 TIMES DAILY
Qty: 15 G | Refills: 0 | Status: SHIPPED | OUTPATIENT
Start: 2025-02-26

## 2025-02-26 NOTE — PROGRESS NOTES
Subjective:       Patient ID: Evelio Pandya is a 85 y.o. male.    Chief Complaint: rash on chest    Patient presents to the clinic with complaint of rash to left chest.     Patient Active Problem List:     Type 2 diabetes mellitus with hyperglycemia, with long-term current use of insulin     Hypertension associated with diabetes     BPH (benign prostatic hyperplasia)     Primary open angle glaucoma (POAG) of both eyes, severe stage     Chronic kidney disease, stage 3 unspecified     BMI 31.0-31.9,adult     Carotid stenosis     Hypertensive encephalopathy     Atherosclerosis of aorta     Type 2 diabetes mellitus with stage 3a chronic kidney disease, with long-term current use of insulin     Hyperlipidemia associated with type 2 diabetes mellitus    Rash started 2 week ago to left chest around left nipple. Reports itching. Has been using Neosporin without relief.       Review of Systems   Constitutional:  Negative for activity change, appetite change, chills, diaphoresis and fever.   HENT:  Negative for congestion, ear pain, postnasal drip, sinus pressure, sneezing and sore throat.    Eyes:  Negative for pain, discharge, redness and itching.   Respiratory:  Negative for apnea, cough, chest tightness, shortness of breath and wheezing.    Cardiovascular:  Negative for chest pain and leg swelling.   Gastrointestinal:  Negative for abdominal distention, abdominal pain, constipation, diarrhea, nausea and vomiting.   Genitourinary:  Negative for difficulty urinating, dysuria, flank pain and frequency.   Skin:  Positive for rash. Negative for color change and wound.   Neurological:  Negative for dizziness.   All other systems reviewed and are negative.      Problem List[1]    Objective:      Physical Exam  Vitals and nursing note reviewed.   Constitutional:       Appearance: Normal appearance. He is not ill-appearing.   HENT:      Head: Normocephalic and atraumatic.      Nose: Nose normal.   Eyes:      General: Lids are  "normal.   Cardiovascular:      Rate and Rhythm: Normal rate.      Pulses: Normal pulses.   Pulmonary:      Effort: Pulmonary effort is normal. No tachypnea or respiratory distress.      Breath sounds: No wheezing.   Musculoskeletal:         General: Normal range of motion.      Cervical back: Full passive range of motion without pain and normal range of motion.      Left lower leg: No edema.   Skin:     General: Skin is warm and dry.      Findings: Rash present. Rash is scaling.          Neurological:      Mental Status: He is alert and oriented to person, place, and time.   Psychiatric:         Mood and Affect: Mood normal.         Behavior: Behavior normal.         Lab Results   Component Value Date    WBC 9.76 11/18/2024    HGB 11.6 (L) 11/18/2024    HCT 37.5 (L) 11/18/2024     11/18/2024    CHOL 136 11/18/2024    TRIG 77 11/18/2024    HDL 38 (L) 11/18/2024    ALT 17 11/18/2024    AST 18 11/18/2024     11/18/2024    K 4.0 11/18/2024     11/18/2024    CREATININE 1.2 11/18/2024    BUN 13 11/18/2024    CO2 24 11/18/2024    TSH 1.360 03/06/2024    INR 1.1 03/06/2024    HGBA1C 6.0 (H) 11/18/2024     The ASCVD Risk score (Steven FARAH, et al., 2019) failed to calculate for the following reasons:    The 2019 ASCVD risk score is only valid for ages 40 to 79  Visit Vitals  /70 (BP Location: Right arm, Patient Position: Sitting)   Pulse 75   Temp 97.4 °F (36.3 °C) (Oral)   Ht 5' 6" (1.676 m)   Wt 96.9 kg (213 lb 10 oz)   SpO2 99%   BMI 34.48 kg/m²      Assessment:       1. Irritant contact dermatitis, unspecified trigger    2. Rash    3. Hypertension associated with diabetes        Plan:       1. Irritant contact dermatitis, unspecified trigger/Rash  -     triamcinolone acetonide 0.1% (KENALOG) 0.1 % ointment; Apply topically 2 (two) times daily.  Dispense: 15 g; Refill: 0   - The diagnosis, treatment plan, instructions for follow-up and reevaluation as well as ED precautions were discussed and " understanding was verbalized. All questions or concerns have been addressed.     2. Hypertension associated with diabetes   - Stable-Continue Norvasc, Losartan   - Continue current plan of care       Follow up if symptoms worsen or fail to improve.      Future Appointments       Date Provider Specialty Appt Notes    5/19/2025 Yamil Gutierrez, DO Family Medicine 6 mo fu                  [1]   Patient Active Problem List  Diagnosis    Type 2 diabetes mellitus with hyperglycemia, with long-term current use of insulin    Hypertension associated with diabetes    BPH (benign prostatic hyperplasia)    Primary open angle glaucoma (POAG) of both eyes, severe stage    Chronic kidney disease, stage 3 unspecified    BMI 31.0-31.9,adult    Carotid stenosis    Hypertensive encephalopathy    Atherosclerosis of aorta    Type 2 diabetes mellitus with stage 3a chronic kidney disease, with long-term current use of insulin    Hyperlipidemia associated with type 2 diabetes mellitus

## 2025-02-26 NOTE — PATIENT INSTRUCTIONS

## 2025-03-06 DIAGNOSIS — Z79.4 TYPE 2 DIABETES MELLITUS WITH STAGE 3A CHRONIC KIDNEY DISEASE, WITH LONG-TERM CURRENT USE OF INSULIN: ICD-10-CM

## 2025-03-06 DIAGNOSIS — J30.1 NON-SEASONAL ALLERGIC RHINITIS DUE TO POLLEN: ICD-10-CM

## 2025-03-06 DIAGNOSIS — N18.31 TYPE 2 DIABETES MELLITUS WITH STAGE 3A CHRONIC KIDNEY DISEASE, WITH LONG-TERM CURRENT USE OF INSULIN: ICD-10-CM

## 2025-03-06 DIAGNOSIS — E11.22 TYPE 2 DIABETES MELLITUS WITH STAGE 3A CHRONIC KIDNEY DISEASE, WITH LONG-TERM CURRENT USE OF INSULIN: ICD-10-CM

## 2025-03-06 DIAGNOSIS — Z79.4 TYPE 2 DIABETES MELLITUS WITH HYPERGLYCEMIA, WITH LONG-TERM CURRENT USE OF INSULIN: ICD-10-CM

## 2025-03-06 DIAGNOSIS — E11.65 TYPE 2 DIABETES MELLITUS WITH HYPERGLYCEMIA, WITH LONG-TERM CURRENT USE OF INSULIN: ICD-10-CM

## 2025-03-07 RX ORDER — SEMAGLUTIDE 1.34 MG/ML
1 INJECTION, SOLUTION SUBCUTANEOUS
Qty: 9 ML | Refills: 0 | Status: SHIPPED | OUTPATIENT
Start: 2025-03-07

## 2025-03-07 NOTE — TELEPHONE ENCOUNTER
Refill Routing Note   Medication(s) are not appropriate for processing by [facility] for the following reason(s):        Due for refill >6 months ago  New or recently adjusted medication(novolog dose incr recently)  Levemir has no adherence data to confirm    [facility] action(s):  Defer  Approve(Ozempic met criteria)        Medication Therapy Plan: Novolog dose incr to 10 units <90days ago; Levemir has no recent dispensings; Claritin also not dispensed recently.      Appointments  past 12m or future 3m with PCP    Date Provider   Last Visit   11/12/2024 Yamil Gutierrez, DO   Next Visit   5/19/2025 Yamil Gutierrez, DO   ED visits in past 90 days: 0        Note composed:8:21 AM 03/07/2025

## 2025-03-07 NOTE — TELEPHONE ENCOUNTER
No care due was identified.  [facility] Embedded Care Due Messages. Reference number: [id number].   3/06/2025 11:05:54 PM CST

## 2025-03-10 DIAGNOSIS — E11.22 TYPE 2 DIABETES MELLITUS WITH STAGE 3A CHRONIC KIDNEY DISEASE, WITH LONG-TERM CURRENT USE OF INSULIN: ICD-10-CM

## 2025-03-10 DIAGNOSIS — Z79.4 TYPE 2 DIABETES MELLITUS WITH STAGE 3A CHRONIC KIDNEY DISEASE, WITH LONG-TERM CURRENT USE OF INSULIN: ICD-10-CM

## 2025-03-10 DIAGNOSIS — N18.31 TYPE 2 DIABETES MELLITUS WITH STAGE 3A CHRONIC KIDNEY DISEASE, WITH LONG-TERM CURRENT USE OF INSULIN: ICD-10-CM

## 2025-03-10 RX ORDER — INSULIN ASPART 100 [IU]/ML
10 INJECTION, SOLUTION INTRAVENOUS; SUBCUTANEOUS
Qty: 15 ML | Refills: 0 | Status: SHIPPED | OUTPATIENT
Start: 2025-03-10

## 2025-03-10 RX ORDER — INSULIN DETEMIR 100 [IU]/ML
15 INJECTION, SOLUTION SUBCUTANEOUS NIGHTLY
Qty: 15 ML | Refills: 0 | Status: SHIPPED | OUTPATIENT
Start: 2025-03-10

## 2025-03-10 RX ORDER — LORATADINE 10 MG/1
10 TABLET ORAL DAILY
Qty: 30 TABLET | Refills: 11 | Status: SHIPPED | OUTPATIENT
Start: 2025-03-10 | End: 2026-03-10

## 2025-03-10 RX ORDER — INSULIN DETEMIR 100 [IU]/ML
15 INJECTION, SOLUTION SUBCUTANEOUS NIGHTLY
Qty: 15 ML | Refills: 0 | Status: SHIPPED | OUTPATIENT
Start: 2025-03-10 | End: 2025-03-10 | Stop reason: SDUPTHER

## 2025-03-10 NOTE — TELEPHONE ENCOUNTER
No care due was identified.  [facility] Embedded Care Due Messages. Reference number: [id number].   3/10/2025 9:27:38 AM CDT

## 2025-03-10 NOTE — TELEPHONE ENCOUNTER
----- Message from Jude sent at 3/10/2025  9:10 AM CDT -----  Contact: Jose Luis  Type:  Pharmacy Calling to Clarify an RXName of Caller:  Jose LuisPharmbarbie Name:  [id number] [address] [address]Pnzdg: [phone] Fax: [phone] Prescription Name:  insulin detemir U-100, Levemir, (LEVEMIR FLEXPEN) 100 unit/mL (3 mL) InPn penWhat do they need to clarify?:  Not able to order this.  Its no longer on the market.Best Call Back Number:  Additional Information:

## 2025-05-06 ENCOUNTER — OFFICE VISIT (OUTPATIENT)
Dept: FAMILY MEDICINE | Facility: CLINIC | Age: 85
End: 2025-05-06
Payer: MEDICARE

## 2025-05-06 VITALS
SYSTOLIC BLOOD PRESSURE: 128 MMHG | HEIGHT: 66 IN | BODY MASS INDEX: 32.56 KG/M2 | OXYGEN SATURATION: 100 % | DIASTOLIC BLOOD PRESSURE: 74 MMHG | TEMPERATURE: 98 F | WEIGHT: 202.63 LBS | HEART RATE: 85 BPM

## 2025-05-06 DIAGNOSIS — Z00.00 ENCOUNTER FOR MEDICARE ANNUAL WELLNESS EXAM: Primary | ICD-10-CM

## 2025-05-06 DIAGNOSIS — H53.8 BLURRY VISION: ICD-10-CM

## 2025-05-06 DIAGNOSIS — E11.69 HYPERLIPIDEMIA ASSOCIATED WITH TYPE 2 DIABETES MELLITUS: ICD-10-CM

## 2025-05-06 DIAGNOSIS — E11.59 HYPERTENSION ASSOCIATED WITH DIABETES: ICD-10-CM

## 2025-05-06 DIAGNOSIS — E11.65 TYPE 2 DIABETES MELLITUS WITH HYPERGLYCEMIA, WITH LONG-TERM CURRENT USE OF INSULIN: ICD-10-CM

## 2025-05-06 DIAGNOSIS — Z79.4 TYPE 2 DIABETES MELLITUS WITH HYPERGLYCEMIA, WITH LONG-TERM CURRENT USE OF INSULIN: ICD-10-CM

## 2025-05-06 DIAGNOSIS — E78.5 HYPERLIPIDEMIA ASSOCIATED WITH TYPE 2 DIABETES MELLITUS: ICD-10-CM

## 2025-05-06 DIAGNOSIS — I70.0 ATHEROSCLEROSIS OF AORTA: ICD-10-CM

## 2025-05-06 DIAGNOSIS — I15.2 HYPERTENSION ASSOCIATED WITH DIABETES: ICD-10-CM

## 2025-05-06 PROCEDURE — 99999 PR PBB SHADOW E&M-EST. PATIENT-LVL V: CPT | Mod: PBBFAC,HCNC,, | Performed by: NURSE PRACTITIONER

## 2025-05-06 NOTE — PROGRESS NOTES
"  Evelio Pandya presented for a  Medicare AWV and comprehensive Health Risk Assessment today. The following components were reviewed and updated:    Medical history  Family History  Social history  Allergies and Current Medications  Health Risk Assessment  Health Maintenance  Care Team     Patient screened moderate and/or high risk for one or more social determinants of health (SDOH). Patient connected to community resources through the ED Navigator.      ** See Completed Assessments for Annual Wellness Visit within the encounter summary.**         The following assessments were completed:  Living Situation  CAGE  Depression Screening  Timed Get Up and Go  Whisper Test  Cognitive Function Screening  Nutrition Screening  ADL Screening  PAQ Screening    Clock in media   Opioid documentation:      Patient does not have a current opioid prescription.        Vitals:    05/06/25 1629   BP: 128/74   Pulse: 85   Temp: 97.6 °F (36.4 °C)   TempSrc: Oral   SpO2: 100%   Weight: 91.9 kg (202 lb 9.6 oz)   Height: 5' 6" (1.676 m)     Body mass index is 32.7 kg/m².  Physical Exam  Constitutional:       Appearance: He is well-developed.   HENT:      Head: Normocephalic and atraumatic.      Right Ear: Hearing normal.      Left Ear: Hearing normal.      Nose: Nose normal.   Eyes:      General: Lids are normal.      Conjunctiva/sclera: Conjunctivae normal.      Pupils: Pupils are equal, round, and reactive to light.   Cardiovascular:      Rate and Rhythm: Normal rate.   Pulmonary:      Effort: Pulmonary effort is normal.   Abdominal:      Palpations: Abdomen is soft.   Musculoskeletal:         General: Normal range of motion.      Cervical back: Normal range of motion and neck supple.   Skin:     General: Skin is warm and dry.   Neurological:      Mental Status: He is alert and oriented to person, place, and time.               Diagnoses and health risks identified today and associated recommendations/orders:    1. Encounter for " Medicare annual wellness exam  Discussed health maintenance guidelines appropriate for age.        2. Hypertension associated with diabetes  Controlled, continue current medication regimen  Low salt diet  Increase physical activity  Followed by pcp      3. Hyperlipidemia associated with type 2 diabetes mellitus  Controlled, continue current medication regimen  Patient taking statin  Followed by pcp      4. Type 2 diabetes mellitus with hyperglycemia, with long-term current use of insulin  Controlled, continue current medication regimen  Last HgA1c - 6.0  ADA diet  Increase physical activity   Followed by pcp      5. Atherosclerosis of aorta  Stable, continue to monitor  Followed by pcp     6. Blurry vision    - Ambulatory referral/consult to Ophthalmology; Future      Provided Evelio with a 5-10 year written screening schedule and personal prevention plan. Recommendations were developed using the USPSTF age appropriate recommendations. Education, counseling, and referrals were provided as needed. After Visit Summary printed and given to patient which includes a list of additional screenings\tests needed.    Follow up for One year for Annual Wellness Visit.    Astrid Torre, NP  I offered to discuss advanced care planning, including how to pick a person who would make decisions for you if you were unable to make them for yourself, called a health care power of , and what kind of decisions you might make such as use of life sustaining treatments such as ventilators and tube feeding when faced with a life limiting illness recorded on a living will that they will need to know. (How you want to be cared for as you near the end of your natural life)     X Patient is interested in learning more about how to make advanced directives.  I provided them paperwork and offered to discuss this with them.

## 2025-05-06 NOTE — PATIENT INSTRUCTIONS
Counseling and Referral of Other Preventative  (Italic type indicates deductible and co-insurance are waived)    Patient Name: Evelio Pandya  Today's Date: 5/6/2025    Health Maintenance       Date Due Completion Date    TETANUS VACCINE Never done ---    Pneumococcal Vaccines (Age 50+) (1 of 2 - PCV) Never done ---    Shingles Vaccine (1 of 2) Never done ---    RSV Vaccine (Age 60+ and Pregnant patients) (1 - 1-dose 75+ series) Never done ---    COVID-19 Vaccine (2 - 2024-25 season) 09/01/2024 2/4/2021    Hemoglobin A1c 05/18/2025 11/18/2024    Diabetes Urine Screening 11/18/2025 11/18/2024    Lipid Panel 11/18/2025 11/18/2024    Diabetic Eye Exam 11/19/2025 11/19/2024    Aspirin/Antiplatelet Therapy 05/06/2026 5/6/2025        No orders of the defined types were placed in this encounter.      The following information is provided to all patients.  This information is to help you find resources for any of the problems found today that may be affecting your health:                  Living healthy guide: www.[facility].[place].gov      Understanding Diabetes: www.diabetes.org      Eating healthy: www.cdc.gov/healthyweight      CDC home safety checklist: www.cdc.gov/steadi/patient.html      Agency on Aging: www.goea.[place].[facility]      Alcoholics anonymous (AA): www.aa.org      Physical Activity: www.damir.nih.gov/dd7bzbi      Tobacco use: www.quitwithusla.org

## 2025-05-08 ENCOUNTER — TELEPHONE (OUTPATIENT)
Dept: FAMILY MEDICINE | Facility: CLINIC | Age: 85
End: 2025-05-08
Payer: MEDICARE

## 2025-05-08 DIAGNOSIS — E11.65 TYPE 2 DIABETES MELLITUS WITH HYPERGLYCEMIA, WITH LONG-TERM CURRENT USE OF INSULIN: ICD-10-CM

## 2025-05-08 DIAGNOSIS — Z79.4 TYPE 2 DIABETES MELLITUS WITH HYPERGLYCEMIA, WITH LONG-TERM CURRENT USE OF INSULIN: ICD-10-CM

## 2025-05-08 NOTE — TELEPHONE ENCOUNTER
Spoke to patient via phone. Advised patient Dr. Gutierrez is no longer with [facility] and patient will need an appointment to establish care with a new provider. Assisted patient with scheduling appointment.

## 2025-05-09 PROBLEM — E11.22 TYPE 2 DIABETES MELLITUS WITH STAGE 3A CHRONIC KIDNEY DISEASE, WITH LONG-TERM CURRENT USE OF INSULIN: Status: RESOLVED | Noted: 2024-06-10 | Resolved: 2025-05-09

## 2025-05-09 PROBLEM — Z79.4 TYPE 2 DIABETES MELLITUS WITH STAGE 3A CHRONIC KIDNEY DISEASE, WITH LONG-TERM CURRENT USE OF INSULIN: Status: RESOLVED | Noted: 2024-06-10 | Resolved: 2025-05-09

## 2025-05-09 PROBLEM — N18.31 TYPE 2 DIABETES MELLITUS WITH STAGE 3A CHRONIC KIDNEY DISEASE, WITH LONG-TERM CURRENT USE OF INSULIN: Status: RESOLVED | Noted: 2024-06-10 | Resolved: 2025-05-09

## 2025-05-09 RX ORDER — METFORMIN HYDROCHLORIDE 500 MG/1
500 TABLET, EXTENDED RELEASE ORAL
Qty: 90 TABLET | Refills: 3 | Status: SHIPPED | OUTPATIENT
Start: 2025-05-09

## 2025-05-09 RX ORDER — SEMAGLUTIDE 1.34 MG/ML
1 INJECTION, SOLUTION SUBCUTANEOUS
Qty: 9 ML | Refills: 0 | Status: SHIPPED | OUTPATIENT
Start: 2025-05-09

## 2025-06-02 ENCOUNTER — LAB VISIT (OUTPATIENT)
Dept: LAB | Facility: HOSPITAL | Age: 85
End: 2025-06-02
Payer: MEDICARE

## 2025-06-02 ENCOUNTER — OFFICE VISIT (OUTPATIENT)
Dept: FAMILY MEDICINE | Facility: CLINIC | Age: 85
End: 2025-06-02
Payer: MEDICARE

## 2025-06-02 ENCOUNTER — TELEPHONE (OUTPATIENT)
Dept: OPHTHALMOLOGY | Facility: CLINIC | Age: 85
End: 2025-06-02
Payer: MEDICARE

## 2025-06-02 VITALS
WEIGHT: 205.5 LBS | HEART RATE: 69 BPM | BODY MASS INDEX: 33.03 KG/M2 | SYSTOLIC BLOOD PRESSURE: 124 MMHG | OXYGEN SATURATION: 99 % | RESPIRATION RATE: 18 BRPM | DIASTOLIC BLOOD PRESSURE: 86 MMHG | HEIGHT: 66 IN

## 2025-06-02 DIAGNOSIS — Z79.4 TYPE 2 DIABETES MELLITUS WITH HYPERGLYCEMIA, WITH LONG-TERM CURRENT USE OF INSULIN: ICD-10-CM

## 2025-06-02 DIAGNOSIS — E78.5 HYPERLIPIDEMIA ASSOCIATED WITH TYPE 2 DIABETES MELLITUS: ICD-10-CM

## 2025-06-02 DIAGNOSIS — E11.59 HYPERTENSION ASSOCIATED WITH DIABETES: ICD-10-CM

## 2025-06-02 DIAGNOSIS — Z91.89 SEDENTARY LIFESTYLE: ICD-10-CM

## 2025-06-02 DIAGNOSIS — D64.9 ANEMIA, UNSPECIFIED TYPE: ICD-10-CM

## 2025-06-02 DIAGNOSIS — E66.811 OBESITY, CLASS I, BMI 30-34.9: ICD-10-CM

## 2025-06-02 DIAGNOSIS — Z76.89 ENCOUNTER TO ESTABLISH CARE: Primary | ICD-10-CM

## 2025-06-02 DIAGNOSIS — E11.65 TYPE 2 DIABETES MELLITUS WITH HYPERGLYCEMIA, WITH LONG-TERM CURRENT USE OF INSULIN: ICD-10-CM

## 2025-06-02 DIAGNOSIS — E11.69 HYPERLIPIDEMIA ASSOCIATED WITH TYPE 2 DIABETES MELLITUS: ICD-10-CM

## 2025-06-02 DIAGNOSIS — Z01.818 PREOP EXAMINATION: ICD-10-CM

## 2025-06-02 DIAGNOSIS — I70.0 ATHEROSCLEROSIS OF AORTA: ICD-10-CM

## 2025-06-02 DIAGNOSIS — I15.2 HYPERTENSION ASSOCIATED WITH DIABETES: ICD-10-CM

## 2025-06-02 DIAGNOSIS — R21 RASH: ICD-10-CM

## 2025-06-02 DIAGNOSIS — Z86.73 HISTORY OF RECURRENT TIAS: ICD-10-CM

## 2025-06-02 DIAGNOSIS — L24.9 IRRITANT CONTACT DERMATITIS, UNSPECIFIED TRIGGER: ICD-10-CM

## 2025-06-02 DIAGNOSIS — Z91.81 AT RISK FOR FALLS: ICD-10-CM

## 2025-06-02 LAB
ABSOLUTE EOSINOPHIL (OHS): 0.33 K/UL
ABSOLUTE MONOCYTE (OHS): 0.56 K/UL (ref 0.3–1)
ABSOLUTE NEUTROPHIL COUNT (OHS): 3.61 K/UL (ref 1.8–7.7)
ALBUMIN SERPL BCP-MCNC: 4.2 G/DL (ref 3.5–5.2)
ALP SERPL-CCNC: 66 UNIT/L (ref 40–150)
ALT SERPL W/O P-5'-P-CCNC: 11 UNIT/L (ref 10–44)
ANION GAP (OHS): 7 MMOL/L (ref 8–16)
AST SERPL-CCNC: 15 UNIT/L (ref 11–45)
BASOPHILS # BLD AUTO: 0.06 K/UL
BASOPHILS NFR BLD AUTO: 0.8 %
BILIRUB SERPL-MCNC: 0.6 MG/DL (ref 0.1–1)
BUN SERPL-MCNC: 12 MG/DL (ref 8–23)
CALCIUM SERPL-MCNC: 9.4 MG/DL (ref 8.7–10.5)
CHLORIDE SERPL-SCNC: 106 MMOL/L (ref 95–110)
CHOLEST SERPL-MCNC: 124 MG/DL (ref 120–199)
CHOLEST/HDLC SERPL: 3.8 {RATIO} (ref 2–5)
CO2 SERPL-SCNC: 24 MMOL/L (ref 23–29)
CREAT SERPL-MCNC: 1.3 MG/DL (ref 0.5–1.4)
EAG (OHS): 131 MG/DL (ref 68–131)
ERYTHROCYTE [DISTWIDTH] IN BLOOD BY AUTOMATED COUNT: 11.8 % (ref 11.5–14.5)
GFR SERPLBLD CREATININE-BSD FMLA CKD-EPI: 55 ML/MIN/1.73/M2
GLUCOSE SERPL-MCNC: 208 MG/DL (ref 70–110)
HBA1C MFR BLD: 6.2 % (ref 4–5.6)
HCT VFR BLD AUTO: 38.1 % (ref 40–54)
HDLC SERPL-MCNC: 33 MG/DL (ref 40–75)
HDLC SERPL: 26.6 % (ref 20–50)
HGB BLD-MCNC: 12.1 GM/DL (ref 14–18)
IMM GRANULOCYTES # BLD AUTO: 0.05 K/UL (ref 0–0.04)
IMM GRANULOCYTES NFR BLD AUTO: 0.7 % (ref 0–0.5)
LDLC SERPL CALC-MCNC: 66.6 MG/DL (ref 63–159)
LYMPHOCYTES # BLD AUTO: 2.91 K/UL (ref 1–4.8)
MCH RBC QN AUTO: 32.1 PG (ref 27–31)
MCHC RBC AUTO-ENTMCNC: 31.8 G/DL (ref 32–36)
MCV RBC AUTO: 101 FL (ref 82–98)
NONHDLC SERPL-MCNC: 91 MG/DL
NUCLEATED RBC (/100WBC) (OHS): 0 /100 WBC
PLATELET # BLD AUTO: 197 K/UL (ref 150–450)
PMV BLD AUTO: 9.3 FL (ref 9.2–12.9)
POTASSIUM SERPL-SCNC: 4.4 MMOL/L (ref 3.5–5.1)
PROT SERPL-MCNC: 7.5 GM/DL (ref 6–8.4)
RBC # BLD AUTO: 3.77 M/UL (ref 4.6–6.2)
RELATIVE EOSINOPHIL (OHS): 4.4 %
RELATIVE LYMPHOCYTE (OHS): 38.7 % (ref 18–48)
RELATIVE MONOCYTE (OHS): 7.4 % (ref 4–15)
RELATIVE NEUTROPHIL (OHS): 48 % (ref 38–73)
SODIUM SERPL-SCNC: 137 MMOL/L (ref 136–145)
TRIGL SERPL-MCNC: 122 MG/DL (ref 30–150)
WBC # BLD AUTO: 7.52 K/UL (ref 3.9–12.7)

## 2025-06-02 PROCEDURE — 80061 LIPID PANEL: CPT | Mod: HCNC

## 2025-06-02 PROCEDURE — 80053 COMPREHEN METABOLIC PANEL: CPT | Mod: HCNC

## 2025-06-02 PROCEDURE — 85025 COMPLETE CBC W/AUTO DIFF WBC: CPT | Mod: HCNC

## 2025-06-02 PROCEDURE — 83036 HEMOGLOBIN GLYCOSYLATED A1C: CPT | Mod: HCNC

## 2025-06-02 PROCEDURE — 99999 PR PBB SHADOW E&M-EST. PATIENT-LVL V: CPT | Mod: PBBFAC,HCNC,,

## 2025-06-02 PROCEDURE — 36415 COLL VENOUS BLD VENIPUNCTURE: CPT | Mod: HCNC,PO

## 2025-06-02 RX ORDER — CLOPIDOGREL BISULFATE 75 MG/1
75 TABLET ORAL DAILY
Qty: 90 TABLET | Refills: 3 | Status: SHIPPED | OUTPATIENT
Start: 2025-06-02

## 2025-06-02 RX ORDER — TRIAMCINOLONE ACETONIDE 1 MG/G
OINTMENT TOPICAL 2 TIMES DAILY
Qty: 15 G | Refills: 0 | Status: SHIPPED | OUTPATIENT
Start: 2025-06-02

## 2025-06-02 RX ORDER — CLOTRIMAZOLE AND BETAMETHASONE DIPROPIONATE 10; .64 MG/G; MG/G
CREAM TOPICAL 2 TIMES DAILY
Qty: 45 G | Refills: 2 | Status: SHIPPED | OUTPATIENT
Start: 2025-06-02 | End: 2025-07-02

## 2025-06-03 ENCOUNTER — RESULTS FOLLOW-UP (OUTPATIENT)
Dept: FAMILY MEDICINE | Facility: CLINIC | Age: 85
End: 2025-06-03

## 2025-06-04 ENCOUNTER — CLINICAL SUPPORT (OUTPATIENT)
Dept: REHABILITATION | Facility: HOSPITAL | Age: 85
End: 2025-06-04
Payer: MEDICARE

## 2025-06-04 DIAGNOSIS — Z91.89 SEDENTARY LIFESTYLE: ICD-10-CM

## 2025-06-04 DIAGNOSIS — R53.81 DEBILITY: Primary | ICD-10-CM

## 2025-06-04 DIAGNOSIS — Z91.81 AT RISK FOR FALLS: ICD-10-CM

## 2025-06-04 PROCEDURE — 97110 THERAPEUTIC EXERCISES: CPT | Mod: HCNC,PO

## 2025-06-04 PROCEDURE — 97161 PT EVAL LOW COMPLEX 20 MIN: CPT | Mod: HCNC,PO

## 2025-06-06 ENCOUNTER — TELEPHONE (OUTPATIENT)
Dept: FAMILY MEDICINE | Facility: CLINIC | Age: 85
End: 2025-06-06
Payer: MEDICARE

## 2025-06-08 DIAGNOSIS — E11.65 TYPE 2 DIABETES MELLITUS WITH HYPERGLYCEMIA, WITH LONG-TERM CURRENT USE OF INSULIN: ICD-10-CM

## 2025-06-08 DIAGNOSIS — Z79.4 TYPE 2 DIABETES MELLITUS WITH HYPERGLYCEMIA, WITH LONG-TERM CURRENT USE OF INSULIN: ICD-10-CM

## 2025-06-09 ENCOUNTER — CLINICAL SUPPORT (OUTPATIENT)
Dept: REHABILITATION | Facility: HOSPITAL | Age: 85
End: 2025-06-09
Payer: MEDICARE

## 2025-06-09 DIAGNOSIS — Z91.81 AT RISK FOR FALLS: ICD-10-CM

## 2025-06-09 DIAGNOSIS — Z91.89 SEDENTARY LIFESTYLE: ICD-10-CM

## 2025-06-09 DIAGNOSIS — R53.81 DEBILITY: Primary | ICD-10-CM

## 2025-06-09 PROCEDURE — 97112 NEUROMUSCULAR REEDUCATION: CPT | Mod: HCNC,PO,CQ

## 2025-06-09 NOTE — PROGRESS NOTES
Outpatient Rehab    Physical Therapy Visit    Patient Name: Evelio Pandya  MRN: [id number]  YOB: 1940  Encounter Date: 6/9/2025    Therapy Diagnosis:   Encounter Diagnoses   Name Primary?    Debility Yes    Sedentary lifestyle     At risk for falls      Physician: Zoran Garcia PA-C    Physician Orders: Eval and Treat  Medical Diagnosis: Sedentary lifestyle  At risk for falls  Surgical Diagnosis: Not applicable for this Episode   Surgical Date: Not applicable for this Episode    Visit # / Visits Authorized:  1 / 10  Insurance Authorization Period: 6/3/2025 to 8/10/2025  Date of Evaluation: 6/4/2025  Plan of Care Certification: 6/4/2025 to 7/9/25   (2x/wk x 4 wks)     PT/PTA: PTA   Number of PTA visits since last PT visit:1    Time In: 1716 (late arrival and BR break before session)   Time Out: 1800  Total Time (in minutes): 44   Total Billable Time (in minutes): 39    FOTO:  Intake Score: 73%  Survey Score 2:  %  Survey Score 3:  %    Precautions:     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 Limited HEP compliance 2* papers are in his granddaughter's car..  Pain reported as 0/10.      Objective            Treatment:  Balance/Neuromuscular Re-Education  NMR 1: heel raises x 20,        standing hip abduction single leg stance x 20,        mini squats 2 x 10,        Tandem stance 2 x 30s each,       SLS 2 x 20s,                  Tandem walking x 2 laps,      Lateral walking x 2 laps,         sit to stand 2 x 10 with UE's across chest  CHARGES BASED ON 1-1 TX:  Time Entry(in minutes):  Neuromuscular Re-Education Time Entry: 44    Assessment & Plan   Assessment: Motivated and pleasant. Limited 2* late arrival and bathroom stop before session. Progressing with balance training with light UE support on // bars. Difficulty with SLS. Edu re: DOMS       The patient will continue to benefit from skilled outpatient physical therapy in order to address the deficits listed in the problem list on the initial  evaluation, provide patient and family education, and maximize the patients level of independence in the home and community environments.     The patient's spiritual, cultural, and educational needs were considered, and the patient is agreeable to the plan of care and goals.           Plan: Continue per POC, progressing as appropriate.    Goals:   Active       LONG TERM GOALS:       1. Improved 30 second sit to stand to > 10 reps. (Progressing)       Start:  06/04/25    Expected End:  07/23/25            2. Patient will improve Dean Balance score 54/56 (Progressing)       Start:  06/04/25    Expected End:  07/23/25            3. Patient will demonstrate tandem walk 20 ft (Progressing)       Start:  06/04/25    Expected End:  07/23/25            4. Patient will improve FOTO score 80/100 (Progressing)       Start:  06/04/25    Expected End:  07/23/25               SHORT TERM GOALS:       Patient will report active compliance to home exercises. (Progressing)       Start:  06/04/25    Expected End:  06/19/25            2. Patient will tolerate 10' continuous activity on Nustep or bike. (Progressing)       Start:  06/04/25    Expected End:  06/19/25                Isha Aldana PTA

## 2025-06-09 NOTE — TELEPHONE ENCOUNTER
Refill Routing Note   Medication(s) are not appropriate for processing by [facility] for the following reason(s):        Non-participating provider    [facility] action(s):  Route               Appointments  past 12m or future 3m with PCP    Date Provider   Last Visit   6/2/2025 Zoran Garcia PA-C   Next Visit   12/2/2025 Zoran Garcia PA-C   ED visits in past 90 days: 0        Note composed:4:47 PM 06/09/2025

## 2025-06-10 RX ORDER — SEMAGLUTIDE 1.34 MG/ML
1 INJECTION, SOLUTION SUBCUTANEOUS
Qty: 9 ML | Refills: 3 | Status: SHIPPED | OUTPATIENT
Start: 2025-06-10 | End: 2025-06-12 | Stop reason: SDUPTHER

## 2025-06-11 ENCOUNTER — CLINICAL SUPPORT (OUTPATIENT)
Dept: REHABILITATION | Facility: HOSPITAL | Age: 85
End: 2025-06-11
Payer: MEDICARE

## 2025-06-11 VITALS — OXYGEN SATURATION: 100 % | DIASTOLIC BLOOD PRESSURE: 84 MMHG | SYSTOLIC BLOOD PRESSURE: 143 MMHG | HEART RATE: 82 BPM

## 2025-06-11 DIAGNOSIS — R53.81 DEBILITY: Primary | ICD-10-CM

## 2025-06-11 DIAGNOSIS — Z91.81 AT RISK FOR FALLS: ICD-10-CM

## 2025-06-11 DIAGNOSIS — Z91.89 SEDENTARY LIFESTYLE: ICD-10-CM

## 2025-06-11 PROCEDURE — 97110 THERAPEUTIC EXERCISES: CPT | Mod: HCNC,PO,CQ

## 2025-06-11 PROCEDURE — 97112 NEUROMUSCULAR REEDUCATION: CPT | Mod: HCNC,PO,CQ

## 2025-06-11 NOTE — PROGRESS NOTES
Outpatient Rehab    Physical Therapy Visit    Patient Name: Evelio Pandya  MRN: [id number]  YOB: 1940  Encounter Date: 6/11/2025    Therapy Diagnosis:   Encounter Diagnoses   Name Primary?    Debility Yes    Sedentary lifestyle     At risk for falls        Physician: Zoran Garcia PA-C    Physician Orders: Eval and Treat  Medical Diagnosis: Sedentary lifestyle  At risk for falls  Surgical Diagnosis: Not applicable for this Episode   Surgical Date: Not applicable for this Episode    Visit # / Visits Authorized:  2 / 10  Insurance Authorization Period: 6/3/2025 to 8/10/2025  Date of Evaluation: 6/4/2025  Plan of Care Certification: 6/4/2025 to 7/9/25   (2x/wk x 4 wks)     PT/PTA: PTA   Number of PTA visits since last PT visit:2    Time In: 1103   Time Out: 1200  Total Time (in minutes): 57   Total Billable Time (in minutes): 57    FOTO:  Intake Score: 73%  Survey Score 2:  %  Survey Score 3:  %    Precautions:     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  Pain reported as 0/10.      Objective   Vital Signs  BP (!) 143/84   Pulse 82   SpO2 100%   BP Location: Left arm  BP Position: Sitting  BP Cuff Size: Adult  After NuStep 133/79, HR 81            Treatment:  Therapeutic Exercise  TE 1: NuStep L3 x 7' with UE's and LE's,      seated hamstring stretch 3 x 15s each,    standing gastroc stretches on 1/2 roll 3 x 30s,  LAQ x 10,    seated hip flexion with core bracing x 10,  Balance/Neuromuscular Re-Education  NMR 1: heel raises/toe raises alternating x 20,     standing hip abduction single leg stance x 20, mini squats 2 x 10, NP-Tandem stance 2 x 30s each, SLS 2 x 30s, Tandem walking x 2 laps, Lateral walking x 2 laps, sit to stand 2 x 10 with UE's across chest ,  CHARGES BASED ON 1-1 TX:  Time Entry(in minutes):  Neuromuscular Re-Education Time Entry: 39  Therapeutic Exercise Time Entry: 18    Assessment & Plan   Assessment: Progressing well with strengthening  without issues. Cues for proper form. Good response to initiation of NuStep for cardiovascular endurance and core stabilization with improved vitals. LOB with SLS 2* delayed protective response but able to catch himself with // [employer] re: DOMS.  Evaluation/Treatment Tolerance: Patient tolerated treatment well    The patient will continue to benefit from skilled outpatient physical therapy in order to address the deficits listed in the problem list on the initial evaluation, provide patient and family education, and maximize the patients level of independence in the home and community environments.     The patient's spiritual, cultural, and educational needs were considered, and the patient is agreeable to the plan of care and goals.           Plan: Continue per POC, progressing as appropriate.    Goals:   Active       LONG TERM GOALS:       1. Improved 30 second sit to stand to > 10 reps. (Progressing)       Start:  06/04/25    Expected End:  07/23/25            2. Patient will improve Dean Balance score 54/56 (Progressing)       Start:  06/04/25    Expected End:  07/23/25            3. Patient will demonstrate tandem walk 20 ft (Progressing)       Start:  06/04/25    Expected End:  07/23/25            4. Patient will improve FOTO score 80/100 (Progressing)       Start:  06/04/25    Expected End:  07/23/25               SHORT TERM GOALS:       Patient will report active compliance to home exercises. (Progressing)       Start:  06/04/25    Expected End:  06/19/25            2. Patient will tolerate 10' continuous activity on Nustep or bike. (Progressing)       Start:  06/04/25    Expected End:  06/19/25                  Isha Aldana PTA

## 2025-06-12 DIAGNOSIS — E11.65 TYPE 2 DIABETES MELLITUS WITH HYPERGLYCEMIA, WITH LONG-TERM CURRENT USE OF INSULIN: ICD-10-CM

## 2025-06-12 DIAGNOSIS — Z79.4 TYPE 2 DIABETES MELLITUS WITH HYPERGLYCEMIA, WITH LONG-TERM CURRENT USE OF INSULIN: ICD-10-CM

## 2025-06-12 NOTE — TELEPHONE ENCOUNTER
LOV 6/2/25  LAB 6/2/25  Next OV 12/2/25    Patient comment: [facility] do not have Ozempic in stock so can the doctor send prescription to [facility] to ?

## 2025-06-13 RX ORDER — SEMAGLUTIDE 1.34 MG/ML
1 INJECTION, SOLUTION SUBCUTANEOUS
Qty: 9 ML | Refills: 3 | Status: SHIPPED | OUTPATIENT
Start: 2025-06-13

## 2025-06-16 ENCOUNTER — CLINICAL SUPPORT (OUTPATIENT)
Dept: REHABILITATION | Facility: HOSPITAL | Age: 85
End: 2025-06-16
Payer: MEDICARE

## 2025-06-16 VITALS — DIASTOLIC BLOOD PRESSURE: 72 MMHG | OXYGEN SATURATION: 98 % | SYSTOLIC BLOOD PRESSURE: 129 MMHG | HEART RATE: 91 BPM

## 2025-06-16 DIAGNOSIS — Z91.89 SEDENTARY LIFESTYLE: ICD-10-CM

## 2025-06-16 DIAGNOSIS — Z91.81 AT RISK FOR FALLS: ICD-10-CM

## 2025-06-16 DIAGNOSIS — R53.81 DEBILITY: Primary | ICD-10-CM

## 2025-06-16 PROCEDURE — 97110 THERAPEUTIC EXERCISES: CPT | Mod: HCNC,PO,CQ

## 2025-06-16 PROCEDURE — 97112 NEUROMUSCULAR REEDUCATION: CPT | Mod: HCNC,PO,CQ

## 2025-06-16 NOTE — PROGRESS NOTES
Outpatient Rehab    Physical Therapy Visit    Patient Name: Evelio Pandya  MRN: [id number]  YOB: 1940  Encounter Date: 6/16/2025    Therapy Diagnosis:   Encounter Diagnoses   Name Primary?    Debility Yes    Sedentary lifestyle     At risk for falls          Physician: Zoran Garcia PA-C    Physician Orders: Eval and Treat  Medical Diagnosis: Sedentary lifestyle  At risk for falls  Surgical Diagnosis: Not applicable for this Episode   Surgical Date: Not applicable for this Episode    Visit # / Visits Authorized:  3 / 10  Insurance Authorization Period: 6/3/2025 to 8/10/2025  Date of Evaluation: 6/4/2025  Plan of Care Certification: 6/4/2025 to 7/9/25   (2x/wk x 4 wks)     PT/PTA: PTA   Number of PTA visits since last PT visit:3    Time In: 1100   Time Out: 1158  Total Time (in minutes): 58   Total Billable Time (in minutes): 44    FOTO:  Intake Score:  %  Survey Score 2:  %  Survey Score 3:  %    Precautions:     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 HEP some days..  Pain reported as 0/10.      Objective   Vital Signs  /72   Pulse 91   SpO2 98%   BP Location: Left arm  BP Position: Sitting  BP Cuff Size: Adult                 Treatment:  Therapeutic Exercise  TE 1: NuStep L3 x 13' with UE's and LE's,      seated hamstring stretch 3 x 30s each,    standing gastroc stretches on 1/2 roll 3 x 30s,  LAQ x 20,    NP-seated hip flexion with core bracing x 10,  Balance/Neuromuscular Re-Education  NMR 1: heel raises/toe raises alternating on foam x 20,     standing hip abduction single leg stance x 20, standing alternating hip abduction x 20,     mini squats on foam 2 x 10, NP-Tandem stance 2 x 30s each, SLS 2 x 30s, Tandem walking x 2 laps, Lateral walking x 2 laps, sit to stand 2 x 10 with UE's across chest ,  CHARGES BASED ON 1-1 TX:  Time Entry(in minutes):  Neuromuscular Re-Education Time Entry: 35  Therapeutic Exercise Time Entry: 23    Assessment & Plan   Assessment:  Progressing well with standing activities with increased challenges with minimal cues for protective responses with UE support on // bars when needed.  Paced 2* limited standing activity tolerance.   Evaluation/Treatment Tolerance: Patient tolerated treatment well    The patient will continue to benefit from skilled outpatient physical therapy in order to address the deficits listed in the problem list on the initial evaluation, provide patient and family education, and maximize the patients level of independence in the home and community environments.     The patient's spiritual, cultural, and educational needs were considered, and the patient is agreeable to the plan of care and goals.           Plan: Continue per POC, progressing as appropriate.    Goals:   Active       LONG TERM GOALS:       1. Improved 30 second sit to stand to > 10 reps. (Progressing)       Start:  06/04/25    Expected End:  07/23/25            2. Patient will improve Dean Balance score 54/56 (Progressing)       Start:  06/04/25    Expected End:  07/23/25            3. Patient will demonstrate tandem walk 20 ft (Progressing)       Start:  06/04/25    Expected End:  07/23/25            4. Patient will improve FOTO score 80/100 (Progressing)       Start:  06/04/25    Expected End:  07/23/25               SHORT TERM GOALS:       Patient will report active compliance to home exercises. (Progressing)       Start:  06/04/25    Expected End:  06/19/25            2. Patient will tolerate 10' continuous activity on Nustep or bike. (Progressing)       Start:  06/04/25    Expected End:  06/19/25                    Isha Aldana PTA

## 2025-06-23 ENCOUNTER — CLINICAL SUPPORT (OUTPATIENT)
Dept: REHABILITATION | Facility: HOSPITAL | Age: 85
End: 2025-06-23
Payer: MEDICARE

## 2025-06-23 DIAGNOSIS — R53.81 DEBILITY: Primary | ICD-10-CM

## 2025-06-23 DIAGNOSIS — Z91.89 SEDENTARY LIFESTYLE: ICD-10-CM

## 2025-06-23 DIAGNOSIS — Z91.81 AT RISK FOR FALLS: ICD-10-CM

## 2025-06-23 PROCEDURE — 97112 NEUROMUSCULAR REEDUCATION: CPT | Mod: HCNC,PO,CQ

## 2025-06-23 PROCEDURE — 97110 THERAPEUTIC EXERCISES: CPT | Mod: HCNC,PO,CQ

## 2025-06-23 NOTE — PROGRESS NOTES
Outpatient Rehab    Physical Therapy Visit    Patient Name: Evelio Pandya  MRN: [id number]  YOB: 1940  Encounter Date: 6/23/2025    Therapy Diagnosis:   Encounter Diagnoses   Name Primary?    Debility Yes    Sedentary lifestyle     At risk for falls            Physician: Zoran Garcia PA-C    Physician Orders: Eval and Treat  Medical Diagnosis: Sedentary lifestyle  At risk for falls  Surgical Diagnosis: Not applicable for this Episode   Surgical Date: Not applicable for this Episode    Visit # / Visits Authorized:  4 / 10  Insurance Authorization Period: 6/3/2025 to 8/10/2025  Date of Evaluation: 6/4/2025  Plan of Care Certification: 6/4/2025 to 7/9/25   (2x/wk x 4 wks)     PT/PTA: PTA   Number of PTA visits since last PT visit:4    Time In: 1104   Time Out: 1204  Total Time (in minutes): 60   Total Billable Time (in minutes): 54    FOTO:  Intake Score: 73%  Survey Score 2: 85%  Survey Score 3:  %    Precautions:     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 HEP some days..  Pain reported as 0/10.      Objective                Treatment:  Therapeutic Exercise  TE 1: NuStep L3 x 12' with UE's and LE's,      seated hamstring stretch 3 x 30s each,    standing gastroc stretches on 1/2 roll 3 x 30s,  LAQ  2# x 15,    seated hip flexion with core bracing  2#x 15,  Balance/Neuromuscular Re-Education  NMR 1: heel raises/toe raises alternating on foam x 20,     standing hip abduction single leg stance x 20, standing alternating hip abduction x 20,     mini squats on foam 2 x 10, NP-Tandem stance 2 x 30s each, SLS 2 x 30s, Tandem walking x 2 laps, Lateral walking x 2 laps, sit to stand 2 x 10 with UE's across chest,  CHARGES BASED ON 1-1 TX:  Time Entry(in minutes):  Neuromuscular Re-Education Time Entry: 29  Therapeutic Exercise Time Entry: 25    Assessment & Plan   Assessment: Continues to progress with standing balance and strength challenges with good response. Cues for weight shifting  for improved misniquats with good correction. Motivated and makes great effort.  Increased FOTO score today, indicating improved pt perception of functional mobility. Will benefit from continued physical therapy intervention to progress toward goals set forth in plan of care to improve functional mobility and quality of life.   Evaluation/Treatment Tolerance: Patient tolerated treatment well    The patient will continue to benefit from skilled outpatient physical therapy in order to address the deficits listed in the problem list on the initial evaluation, provide patient and family education, and maximize the patients level of independence in the home and community environments.     The patient's spiritual, cultural, and educational needs were considered, and the patient is agreeable to the plan of care and goals.           Plan: Continue per POC, progressing as appropriate.    Goals:   Active       LONG TERM GOALS:       1. Improved 30 second sit to stand to > 10 reps. (Progressing)       Start:  06/04/25    Expected End:  07/23/25            2. Patient will improve Dean Balance score 54/56 (Progressing)       Start:  06/04/25    Expected End:  07/23/25            3. Patient will demonstrate tandem walk 20 ft (Progressing)       Start:  06/04/25    Expected End:  07/23/25            4. Patient will improve FOTO score 80/100 (Progressing)       Start:  06/04/25    Expected End:  07/23/25               SHORT TERM GOALS:       Patient will report active compliance to home exercises. (Progressing)       Start:  06/04/25    Expected End:  06/19/25            2. Patient will tolerate 10' continuous activity on Nustep or bike. (Progressing)       Start:  06/04/25    Expected End:  06/19/25                      Isha Aldana PTA      FOTO QR code:

## 2025-06-25 ENCOUNTER — CLINICAL SUPPORT (OUTPATIENT)
Dept: REHABILITATION | Facility: HOSPITAL | Age: 85
End: 2025-06-25
Payer: MEDICARE

## 2025-06-25 DIAGNOSIS — R53.81 DEBILITY: Primary | ICD-10-CM

## 2025-06-25 DIAGNOSIS — Z91.89 SEDENTARY LIFESTYLE: ICD-10-CM

## 2025-06-25 DIAGNOSIS — Z91.81 AT RISK FOR FALLS: ICD-10-CM

## 2025-06-25 PROCEDURE — 97112 NEUROMUSCULAR REEDUCATION: CPT | Mod: HCNC,PO,CQ

## 2025-06-25 PROCEDURE — 97110 THERAPEUTIC EXERCISES: CPT | Mod: HCNC,PO,CQ

## 2025-06-25 NOTE — PROGRESS NOTES
Outpatient Rehab    Physical Therapy Visit    Patient Name: Evelio Pandya  MRN: [id number]  YOB: 1940  Encounter Date: 6/25/2025    Therapy Diagnosis:   Encounter Diagnoses   Name Primary?    Debility Yes    Sedentary lifestyle     At risk for falls          Physician: Zoran Garcia PA-C    Physician Orders: Eval and Treat  Medical Diagnosis: Sedentary lifestyle  At risk for falls  Surgical Diagnosis: Not applicable for this Episode   Surgical Date: Not applicable for this Episode    Visit # / Visits Authorized:  5 / 10  Insurance Authorization Period: 6/3/2025 to 8/10/2025  Date of Evaluation: 6/4/2025  Plan of Care Certification: 6/4/2025 to 7/9/25   (2x/wk x 4 wks)     PT/PTA: PTA   Number of PTA visits since last PT visit:5    Time In: 1003   Time Out: 1100  Total Time (in minutes): 57   Total Billable Time (in minutes): 28    FOTO:  Intake Score: 73%  Survey Score 2: 85%  Survey Score 3:  %    Precautions:     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 HEP some days..  Pain reported as 0/10.      Objective                Treatment:  Therapeutic Exercise  TE 1: NuStep L3 x 12' with UE's and LE's,      seated hamstring stretch 3 x 30s each,    standing gastroc stretches on 1/2 roll 3 x 30s,  LAQ  2# x 15,    seated hip flexion with core bracing  2#x 15, Shuttle B leg press 50# 2 x 10,  Balance/Neuromuscular Re-Education  NMR 1: heel raises/toe raises alternating on foam x 20,     standing hip abduction single leg stance x 20, standing alternating hip abduction x 20,     mini squats on foam 2 x 10, Tandem stance 2 x 30s each, SLS 2 x 30s, Tandem walking x 3 laps there and back with CGA to minimal A on metal floor plank, Lateral walking x 2 laps, sit to stand 2 x 10 with UE's across chest,   BOSU static stand with flat side up x 2 minutes CG/SBA and cues for safety when getting on and off.    CHARGES BASED ON 1-1 TX:  Time Entry(in minutes):  Neuromuscular Re-Education Time Entry:  32  Therapeutic Exercise Time Entry: 25    Assessment & Plan   Assessment: Progressing well with appropriate strength and balance challenges. Cues for safety and set up and weaning from UE support. Motivated. Notable increased speed with sit>stand from waiting area chair and walking back into clinic. Educated pt that he/she may feel soreness after session.    Will benefit from continued physical therapy intervention to progress toward goals set forth in plan of care to improve functional mobility and quality of life.   Evaluation/Treatment Tolerance: Patient tolerated treatment well    The patient will continue to benefit from skilled outpatient physical therapy in order to address the deficits listed in the problem list on the initial evaluation, provide patient and family education, and maximize the patients level of independence in the home and community environments.     The patient's spiritual, cultural, and educational needs were considered, and the patient is agreeable to the plan of care and goals.           Plan: Continue per POC, progressing as appropriate.    Goals:   Active       LONG TERM GOALS:       1. Improved 30 second sit to stand to > 10 reps. (Progressing)       Start:  06/04/25    Expected End:  07/23/25            2. Patient will improve Dean Balance score 54/56 (Progressing)       Start:  06/04/25    Expected End:  07/23/25            3. Patient will demonstrate tandem walk 20 ft (Progressing)       Start:  06/04/25    Expected End:  07/23/25            4. Patient will improve FOTO score 80/100 (Progressing)       Start:  06/04/25    Expected End:  07/23/25               SHORT TERM GOALS:       Patient will report active compliance to home exercises. (Progressing)       Start:  06/04/25    Expected End:  06/19/25            2. Patient will tolerate 10' continuous activity on Nustep or bike. (Progressing)       Start:  06/04/25    Expected End:  06/19/25                        Isha Aldana  PTA      FOTO QR code:

## 2025-06-27 ENCOUNTER — OFFICE VISIT (OUTPATIENT)
Dept: OPHTHALMOLOGY | Facility: CLINIC | Age: 85
End: 2025-06-27
Payer: MEDICARE

## 2025-06-27 DIAGNOSIS — H40.1113 PRIMARY OPEN-ANGLE GLAUCOMA, RIGHT EYE, SEVERE STAGE: ICD-10-CM

## 2025-06-27 DIAGNOSIS — H40.1122 PRIMARY OPEN-ANGLE GLAUCOMA, LEFT EYE, MODERATE STAGE: ICD-10-CM

## 2025-06-27 DIAGNOSIS — E11.9 DIABETES MELLITUS TYPE 2 WITHOUT RETINOPATHY: ICD-10-CM

## 2025-06-27 DIAGNOSIS — H25.812 COMBINED FORM OF AGE-RELATED CATARACT, LEFT EYE: Primary | ICD-10-CM

## 2025-06-27 PROCEDURE — 99999 PR PBB SHADOW E&M-EST. PATIENT-LVL III: CPT | Mod: PBBFAC,HCNC,, | Performed by: OPHTHALMOLOGY

## 2025-06-27 RX ORDER — BRIMONIDINE TARTRATE 2 MG/ML
1 SOLUTION/ DROPS OPHTHALMIC EVERY 12 HOURS
Qty: 30 ML | Refills: 3 | Status: CANCELLED | OUTPATIENT
Start: 2025-06-27

## 2025-06-27 RX ORDER — DORZOLAMIDE HYDROCHLORIDE AND TIMOLOL MALEATE 20; 5 MG/ML; MG/ML
1 SOLUTION/ DROPS OPHTHALMIC EVERY 12 HOURS
Qty: 30 ML | Refills: 3 | Status: CANCELLED | OUTPATIENT
Start: 2025-06-27

## 2025-06-27 RX ORDER — TRAVOPROST OPHTHALMIC SOLUTION 0.04 MG/ML
1 SOLUTION OPHTHALMIC NIGHTLY
Qty: 7.5 ML | Refills: 3 | Status: SHIPPED | OUTPATIENT
Start: 2025-06-27

## 2025-06-27 RX ORDER — BRIMONIDINE TARTRATE 2 MG/ML
1 SOLUTION/ DROPS OPHTHALMIC EVERY 12 HOURS
Qty: 30 ML | Refills: 3 | Status: SHIPPED | OUTPATIENT
Start: 2025-06-27

## 2025-06-27 RX ORDER — TRAVOPROST OPHTHALMIC SOLUTION 0.04 MG/ML
1 SOLUTION OPHTHALMIC NIGHTLY
Qty: 7.5 ML | Refills: 3 | Status: CANCELLED | OUTPATIENT
Start: 2025-06-27

## 2025-06-27 RX ORDER — DORZOLAMIDE HYDROCHLORIDE AND TIMOLOL MALEATE 20; 5 MG/ML; MG/ML
1 SOLUTION/ DROPS OPHTHALMIC EVERY 12 HOURS
Qty: 30 ML | Refills: 3 | Status: SHIPPED | OUTPATIENT
Start: 2025-06-27

## 2025-06-27 NOTE — PROGRESS NOTES
HPI    DLS: 11/19/2024- LTFU POAG / CE OS     Pt states wants to be checked for cataracts again. States no longer   driving anymore. States vision is not good enough to drive. Glare is   bothersome. Quit driving at night a while ago.     Denies pain/ FOL/ floaters.     Travoprost OU QHS   Brimonidine OU BID   Cosopt OU BID     Last edited by Yenifer Haider on 6/27/2025 12:18 PM.            Assessment /Plan     For exam results, see Encounter Report.    Combined form of age-related cataract, left eye    Diabetes mellitus type 2 without retinopathy  -     Ambulatory referral/consult to Ophthalmology    Primary open-angle glaucoma, right eye, severe stage    Primary open-angle glaucoma, left eye, moderate stage      1. Diabetes mellitus type 2 without retinopathy  Diabetes without retinopathy, discussed with patient importance of glucose control and follow up.  Patient voices understanding.    2. Combined form of age-related cataract, left eye (Primary)  Patient with visually significant cataract impacting abiliity ADLs (reading, driving, PM driving, glare).  Discussed options, R & B, expectations, patient voices good understanding and wishes to proceed with procedure. Patient will likely benefit from sx.    Schedule CEIOL OS  Monofocal dist IOL, defer MIGs  RTC preop    3. Primary open-angle glaucoma, right eye, severe stage  4. Primary open-angle glaucoma, left eye, moderate stage  +famhx (mother)  Thin pachy    HM vision OD, s/p tube shunt  OCT NFL damaged OD>OS, stable OS  Moderate HVF damage OS  Tmax unknown    IOP excellent    continue  Brim BID OU  Dorz/viv bid OU  Konrad qhs OU    Visit today is associated with current or anticipated ongoing medical care related to this patients single serious and complex condition, glaucoma.

## 2025-07-03 DIAGNOSIS — I10 HYPERTENSION, UNSPECIFIED TYPE: ICD-10-CM

## 2025-07-06 RX ORDER — LOSARTAN POTASSIUM 50 MG/1
TABLET ORAL
Qty: 90 TABLET | Refills: 3 | Status: SHIPPED | OUTPATIENT
Start: 2025-07-06

## 2025-07-06 RX ORDER — AMLODIPINE BESYLATE 10 MG/1
TABLET ORAL
Qty: 90 TABLET | Refills: 3 | Status: SHIPPED | OUTPATIENT
Start: 2025-07-06

## 2025-07-17 ENCOUNTER — OFFICE VISIT (OUTPATIENT)
Dept: OPHTHALMOLOGY | Facility: CLINIC | Age: 85
End: 2025-07-17
Payer: MEDICARE

## 2025-07-17 DIAGNOSIS — H25.812 COMBINED FORM OF AGE-RELATED CATARACT, LEFT EYE: Primary | ICD-10-CM

## 2025-07-17 PROCEDURE — 99999 PR PBB SHADOW E&M-EST. PATIENT-LVL II: CPT | Mod: PBBFAC,HCNC,, | Performed by: OPHTHALMOLOGY

## 2025-07-17 RX ORDER — PREDNISOLONE ACETATE 10 MG/ML
1 SUSPENSION/ DROPS OPHTHALMIC 4 TIMES DAILY
Qty: 5 ML | Refills: 2 | Status: SHIPPED | OUTPATIENT
Start: 2025-07-17

## 2025-07-17 RX ORDER — CYCLOP/TROP/PROPA/PHEN/KET/WAT 1-1-0.1%
1 DROPS (EA) OPHTHALMIC (EYE)
OUTPATIENT
Start: 2025-07-17 | End: 2025-07-17

## 2025-07-17 RX ORDER — MOXIFLOXACIN 5 MG/ML
1 SOLUTION/ DROPS OPHTHALMIC 4 TIMES DAILY
Qty: 3 ML | Refills: 1 | Status: SHIPPED | OUTPATIENT
Start: 2025-07-17

## 2025-07-17 RX ORDER — KETOROLAC TROMETHAMINE 5 MG/ML
1 SOLUTION OPHTHALMIC 4 TIMES DAILY
Qty: 5 ML | Refills: 2 | Status: SHIPPED | OUTPATIENT
Start: 2025-07-17

## 2025-07-17 RX ORDER — SODIUM CHLORIDE 9 MG/ML
INJECTION, SOLUTION INTRAVENOUS CONTINUOUS
OUTPATIENT
Start: 2025-07-17

## 2025-07-17 NOTE — PROGRESS NOTES
HPI    Pre op cat sx OS scheduled for 7/30    Pt c/o blurry vision and glare.     Denies pain/ FOL/ floaters.      Travoprost OU QHS   Brimonidine OU BID   Cosopt OU BID     Last edited by Yenifer Haider on 7/17/2025  3:14 PM.            Assessment /Plan     For exam results, see Encounter Report.    Combined form of age-related cataract, left eye      Patient with visually significant cataract impacting abiliity ADLs (reading, driving, PM driving, glare).  Discussed options, R & B, expectations, patient voices good understanding and wishes to proceed with procedure. Patient will likely benefit from sx and signed consent.    Proceed with CEIOL OS  Monofocal dist IOL  Higher risk, monocular    Continue glaucoma drops unchanged

## 2025-07-29 ENCOUNTER — ANESTHESIA EVENT (OUTPATIENT)
Dept: SURGERY | Facility: HOSPITAL | Age: 85
End: 2025-07-29
Payer: MEDICARE

## 2025-07-29 NOTE — DISCHARGE INSTRUCTIONS
Discharge Instructions for  Surgery    USE DROPS AS INSTRUCTED:     PREDNISOLONE  ONE DROP 4 TIMES A DAY  Moxifloxacin ONE DROP 4 TIMES A DAY  KETOROLAC ONE DROP 4 TIMES A DAY     Continue glaucoma drops unchanged    WAIT 2 MINUTES BETWEEN DROPS     BRING ALL EYE DROPS TO YOUR CLINIC VISITS    Wear sunglasses during the day  Do not sleep on the affected side and wear eye shield while sleeping for 2 weeks.  No exercise or lifting greater than 10 lbs for 2 weeks.  Try not to cough. If coughing a lot, take a cough suppressent  Do not bend over for 2 weeks.  Keep water it of your eye for 2 weeks.             Wear sunglasses for comfort as needed.  It is normal to feel a slight irritation, like there is an eyelash in the eye that had surgery.   You may take Tylenol for discomfort but if pain intensifies , call Dr     If you use eye drops for glaucoma you may continue to use them.      After Surgery:  Always be aware that any surgery can cause these symptoms:    Pain- Medication can be prescribed for pain to decrease your pain but may not completely take your pain away.  Over the Counter pain medicine my be enough and you can always use Ice and rest to help ease pain.    Bleeding- a little bleeding after a surgery is usually within normal.  If there is a lot of blood you need to notify your MD.  Emergency treatments of bleeding are cold application, elevation of the bleeding site and compression.    Infection- Infection after surgery is NOT a normal occurrence.  Signs of infection are fever, swelling, hot to touch the incision.  If this occurs notify your MD immediately.    Nausea- this can be common after a surgery especially if you have had anesthesia medicine or are taking pain medicine.  Staying on clear liquids, bland foods, gingerale, or over the counter anti nausea medicines can help.  If you vomit more than once, notify your MD.  Anti Nausea medicines can be prescribed.

## 2025-07-30 ENCOUNTER — HOSPITAL ENCOUNTER (OUTPATIENT)
Facility: HOSPITAL | Age: 85
Discharge: HOME | End: 2025-07-30
Attending: OPHTHALMOLOGY | Admitting: OPHTHALMOLOGY
Payer: MEDICARE

## 2025-07-30 ENCOUNTER — ANESTHESIA (OUTPATIENT)
Dept: SURGERY | Facility: HOSPITAL | Age: 85
End: 2025-07-30
Payer: MEDICARE

## 2025-07-30 DIAGNOSIS — H25.812 COMBINED FORM OF AGE-RELATED CATARACT, LEFT EYE: ICD-10-CM

## 2025-07-30 LAB — POCT GLUCOSE: 194 MG/DL (ref 70–110)

## 2025-07-30 PROCEDURE — 37000008 HC ANESTHESIA 1ST 15 MINUTES: Performed by: OPHTHALMOLOGY

## 2025-07-30 PROCEDURE — V2632 POST CHMBR INTRAOCULAR LENS: HCPCS | Performed by: OPHTHALMOLOGY

## 2025-07-30 PROCEDURE — 63600175 PHARM REV CODE 636 W HCPCS: Performed by: NURSE ANESTHETIST, CERTIFIED REGISTERED

## 2025-07-30 PROCEDURE — 25000003 PHARM REV CODE 250: Performed by: OPHTHALMOLOGY

## 2025-07-30 PROCEDURE — 71000033 HC RECOVERY, INTIAL HOUR: Performed by: OPHTHALMOLOGY

## 2025-07-30 PROCEDURE — 36000706: Performed by: OPHTHALMOLOGY

## 2025-07-30 PROCEDURE — 66984 XCAPSL CTRC RMVL W/O ECP: CPT | Mod: LT,,, | Performed by: OPHTHALMOLOGY

## 2025-07-30 PROCEDURE — 36000707: Performed by: OPHTHALMOLOGY

## 2025-07-30 PROCEDURE — 63600175 PHARM REV CODE 636 W HCPCS: Performed by: ANESTHESIOLOGY

## 2025-07-30 PROCEDURE — 37000009 HC ANESTHESIA EA ADD 15 MINS: Performed by: OPHTHALMOLOGY

## 2025-07-30 PROCEDURE — 63600175 PHARM REV CODE 636 W HCPCS: Performed by: OPHTHALMOLOGY

## 2025-07-30 DEVICE — TECNIS SMPLCTY TECNIS 1PC CLR MONO 19.5D
Type: IMPLANTABLE DEVICE | Site: EYE | Status: FUNCTIONAL
Brand: TECNIS SIMPLICITY

## 2025-07-30 RX ORDER — ONDANSETRON HYDROCHLORIDE 2 MG/ML
INJECTION, SOLUTION INTRAVENOUS
Status: DISCONTINUED | OUTPATIENT
Start: 2025-07-30 | End: 2025-07-30

## 2025-07-30 RX ORDER — CYCLOP/TROP/PROPA/PHEN/KET/WAT 1-1-0.1%
1 DROPS (EA) OPHTHALMIC (EYE)
Status: COMPLETED | OUTPATIENT
Start: 2025-07-30 | End: 2025-07-30

## 2025-07-30 RX ORDER — LIDOCAINE HYDROCHLORIDE 10 MG/ML
1 INJECTION, SOLUTION EPIDURAL; INFILTRATION; INTRACAUDAL; PERINEURAL ONCE
Status: COMPLETED | OUTPATIENT
Start: 2025-07-30 | End: 2025-07-30

## 2025-07-30 RX ORDER — MIDAZOLAM HYDROCHLORIDE 1 MG/ML
INJECTION INTRAMUSCULAR; INTRAVENOUS
Status: DISCONTINUED | OUTPATIENT
Start: 2025-07-30 | End: 2025-07-30

## 2025-07-30 RX ORDER — SODIUM CHLORIDE 9 MG/ML
INJECTION, SOLUTION INTRAVENOUS CONTINUOUS
Status: DISCONTINUED | OUTPATIENT
Start: 2025-07-30 | End: 2025-07-30

## 2025-07-30 RX ORDER — LIDOCAINE HYDROCHLORIDE 40 MG/ML
INJECTION, SOLUTION RETROBULBAR
Status: DISCONTINUED | OUTPATIENT
Start: 2025-07-30 | End: 2025-07-30 | Stop reason: HOSPADM

## 2025-07-30 RX ORDER — EPINEPHRINE 1 MG/ML
INJECTION, SOLUTION, CONCENTRATE INTRAVENOUS
Status: DISCONTINUED | OUTPATIENT
Start: 2025-07-30 | End: 2025-07-30 | Stop reason: HOSPADM

## 2025-07-30 RX ORDER — MOXIFLOXACIN 5 MG/ML
SOLUTION/ DROPS OPHTHALMIC
Status: DISCONTINUED | OUTPATIENT
Start: 2025-07-30 | End: 2025-07-30 | Stop reason: HOSPADM

## 2025-07-30 RX ADMIN — Medication 1 DROP: at 08:07

## 2025-07-30 RX ADMIN — MIDAZOLAM HYDROCHLORIDE 1 MG: 1 INJECTION, SOLUTION INTRAMUSCULAR; INTRAVENOUS at 10:07

## 2025-07-30 RX ADMIN — LIDOCAINE HYDROCHLORIDE 2 MG: 10 INJECTION, SOLUTION EPIDURAL; INFILTRATION; INTRACAUDAL at 09:07

## 2025-07-30 RX ADMIN — Medication 1 DROP: at 09:07

## 2025-07-30 RX ADMIN — ONDANSETRON 4 MG: 2 INJECTION, SOLUTION INTRAMUSCULAR; INTRAVENOUS at 10:07

## 2025-07-30 NOTE — PLAN OF CARE
Discharge instructions given to pt, verbalized understanding.  Tolerating PO fluids.  IV removed.  Denies pain.  Sunglasses on.  First set of eye drops done.  Up in chair waiting for dtr to .  No distress noted.

## 2025-07-30 NOTE — ANESTHESIA PREPROCEDURE EVALUATION
07/30/2025  Evelio Pandya is a 85 y.o., male.      Pre-op Assessment    I have reviewed the Patient Summary Reports.     I have reviewed the Nursing Notes. I have reviewed the NPO Status.   I have reviewed the Medications.     Review of Systems  Anesthesia Hx:  No problems with previous Anesthesia                Social:  Non-Smoker       EENT/Dental:   Cataract           Cardiovascular:     Hypertension           hyperlipidemia                         Hypertension         Renal/:  Chronic Renal Disease, CKD        Kidney Function/Disease             Neurological:  TIA,                         TIA - Transient Ischemic Attack               Endocrine:  Diabetes, type 2    Diabetes                          Physical Exam  General: Cooperative, Well nourished, Alert and Oriented    Airway:  Mallampati: II   Mouth Opening: Normal  TM Distance: Normal  Tongue: Normal  Neck ROM: Normal ROM    Chest/Lungs:  Normal Respiratory Rate    Heart:  Rate: Normal        Anesthesia Plan  Type of Anesthesia, risks & benefits discussed:    Anesthesia Type: MAC  Intra-op Monitoring Plan: Standard ASA Monitors  Post Op Pain Control Plan: multimodal analgesia  Informed Consent: Informed consent signed with the Patient and all parties understand the risks and agree with anesthesia plan.  All questions answered.   ASA Score: 3  Day of Surgery Review of History & Physical: H&P Update referred to the surgeon/provider.    Ready For Surgery From Anesthesia Perspective.     .

## 2025-07-30 NOTE — H&P
History    Chief complaint:  Painless progressive vision loss left Eye    Present Ilness/Diagnosis: Visually significant cataract, left Eye    ROS: +Eyes, otherwise no significant changes    Past Medical History: refer to chart    Family History/Social History: refer to chart    Allergies:   Review of patient's allergies indicates:  No Known Allergies    Current Medications: see medcard      Physical Exam    BP: Vital signs stable  General: No apparent distress  HEENT: cataract  Lungs: adequate respirations  Heart: + pulses  Abdomen: soft  Rectal/pelvic: deferred    Labs: Labs Reviewed    Lab Results   Component Value Date    WBC 7.52 06/02/2025    HGB 12.1 (L) 06/02/2025    HCT 38.1 (L) 06/02/2025     (H) 06/02/2025     06/02/2025           CMP  Sodium   Date Value Ref Range Status   06/02/2025 137 136 - 145 mmol/L Final   11/18/2024 139 136 - 145 mmol/L Final     Potassium   Date Value Ref Range Status   06/02/2025 4.4 3.5 - 5.1 mmol/L Final   11/18/2024 4.0 3.5 - 5.1 mmol/L Final     Chloride   Date Value Ref Range Status   06/02/2025 106 95 - 110 mmol/L Final   11/18/2024 108 95 - 110 mmol/L Final     CO2   Date Value Ref Range Status   06/02/2025 24 23 - 29 mmol/L Final   11/18/2024 24 23 - 29 mmol/L Final     Glucose   Date Value Ref Range Status   06/02/2025 208 (H) 70 - 110 mg/dL Final   11/18/2024 101 70 - 110 mg/dL Final     BUN   Date Value Ref Range Status   06/02/2025 12 8 - 23 mg/dL Final     Creatinine   Date Value Ref Range Status   06/02/2025 1.3 0.5 - 1.4 mg/dL Final     Calcium   Date Value Ref Range Status   06/02/2025 9.4 8.7 - 10.5 mg/dL Final   11/18/2024 8.8 8.7 - 10.5 mg/dL Final     Protein Total   Date Value Ref Range Status   06/02/2025 7.5 6.0 - 8.4 gm/dL Final     Total Protein   Date Value Ref Range Status   11/18/2024 7.3 6.0 - 8.4 g/dL Final     Albumin   Date Value Ref Range Status   06/02/2025 4.2 3.5 - 5.2 g/dL Final   11/18/2024 3.6 3.5 - 5.2 g/dL Final     Total  Bilirubin   Date Value Ref Range Status   11/18/2024 0.6 0.1 - 1.0 mg/dL Final     Comment:     For infants and newborns, interpretation of results should be based  on gestational age, weight and in agreement with clinical  observations.    Premature Infant recommended reference ranges:  Up to 24 hours.............<8.0 mg/dL  Up to 48 hours............<12.0 mg/dL  3-5 days..................<15.0 mg/dL  6-29 days.................<15.0 mg/dL       Bilirubin Total   Date Value Ref Range Status   06/02/2025 0.6 0.1 - 1.0 mg/dL Final     Comment:     For infants and newborns, interpretation of results should be based   on gestational age, weight and in agreement with clinical   observations.    Premature Infant recommended reference ranges:   0-24 hours:  <8.0 mg/dL   24-48 hours: <12.0 mg/dL   3-5 days:    <15.0 mg/dL   6-29 days:   <15.0 mg/dL     Alkaline Phosphatase   Date Value Ref Range Status   11/18/2024 84 40 - 150 U/L Final     ALP   Date Value Ref Range Status   06/02/2025 66 40 - 150 unit/L Final     AST   Date Value Ref Range Status   06/02/2025 15 11 - 45 unit/L Final   11/18/2024 18 10 - 40 U/L Final     ALT   Date Value Ref Range Status   06/02/2025 11 10 - 44 unit/L Final   11/18/2024 17 10 - 44 U/L Final     Anion Gap   Date Value Ref Range Status   06/02/2025 7 (L) 8 - 16 mmol/L Final     eGFR   Date Value Ref Range Status   06/02/2025 55 (L) >60 mL/min/1.73/m2 Final     Comment:     Estimated GFR calculated using the CKD-EPI creatinine (2021) equation.   11/18/2024 >60.0 >60 mL/min/1.73 m^2 Final       The patient has been cleared for surgery in an ambulatory surgery facility.     Impression: Visually significant Cataract left Eye    Plan: Phacoemulsification with implantation of Intraocular lens left Eye

## 2025-07-30 NOTE — DISCHARGE SUMMARY
[facility] - Periop Services  Discharge Note  Short Stay    Procedure(s) (LRB):  CEIOL OS (Left)      OUTCOME: Patient tolerated treatment/procedure well without complication and is now ready for discharge.    DISPOSITION: Home or Self Care    FINAL DIAGNOSIS:  cataract    FOLLOWUP: In clinic    DISCHARGE INSTRUCTIONS:  No discharge procedures on file.     TIME SPENT ON DISCHARGE: 5 minutes

## 2025-07-30 NOTE — TRANSFER OF CARE
"Anesthesia Transfer of Care Note    Patient: Evelio Pandya    Procedure(s) Performed: Procedure(s) (LRB):  CEIOL OS (Left)    Patient location: PACU    Anesthesia Type: MAC    Transport from OR: Transported from OR on room air with adequate spontaneous ventilation    Post pain: adequate analgesia    Post assessment: no apparent anesthetic complications and tolerated procedure well    Post vital signs: stable    Level of consciousness: awake, alert and oriented    Nausea/Vomiting: no nausea/vomiting    Complications: none    Transfer of care protocol was followed      Last vitals: Visit Vitals  /78 (BP Location: Right arm, Patient Position: Sitting)   Pulse 81   Temp 36.5 °C (97.7 °F) (Tympanic)   Resp 18   Ht 5' 6" (1.676 m)   Wt 93.2 kg (205 lb 7.5 oz)   SpO2 99%   BMI 33.16 kg/m²     "

## 2025-07-30 NOTE — OP NOTE
Operative Date:  07/30/2025    Discharge Date:  07/30/2025    Report Title: Operative Note    SURGEON: Han Peters MD    ASSISTANT: None    PREOPERATIVE DIAGNOSIS: Combined form of senile cataract,  Left Eye    POSTOPERATIVE DIAGNOSIS: same    PROCEDURE PERFORMED: Phacoemulsification of the cataract with posterior chamber intraocular lens Left Eye    IMPLANTS: DCBOO 19.5    ANESTHESIA:  Topical with MAC    COMPLICATIONS: None    ESTIMATED BLOOD LOSS: Minimal    PROCEDURE: The patient was brought to the operating room, time out was performed and implant checked.  The patient was given light sedation, and topical anesthesia was instilled in the left eye.  The left eye was prepped and draped in the usual fashion for eye surgery and lid speculum used to retract the eyelid. The eyelashes were secluded within the drape.  A paracentesis was made inferiorly with a sideport blade. Epishugarcaine was injected in the anterior chamber and dispersive viscoelastic was injected into the anterior chamber. A temporal corneal incision was made with a steel keratome. A cystitome was used to initiate a continuous curvilinear capsulorrhexis and completed using the capsulorrhexis forceps. Hydrodissection of the lens nucleus was performed using balanced salt solution (BSS) on hydrodissection cannula. The lens nucleus was removed using phacoemulsification in the modified stop and chop technique. The lens cortex was removed using the irrigation/aspiration handpiece. The capsular bag was filled with viscoelastic, and the intraocular lens was injected into the capsular bag under direct visualization. Viscoelastic was removed using the irrigation/aspiration handpiece. The wounds were hydrated until watertight.    The wounds were rechecked and no leakage was noted.  The speculum was removed. Topical antibiotic was applied to the eye and shield was placed over the eye. The patient tolerated the procedure well and left the operating room in  good condition.

## 2025-07-30 NOTE — ANESTHESIA POSTPROCEDURE EVALUATION
Anesthesia Post Evaluation    Patient: Evelio Pandya    Procedure(s) Performed: Procedure(s) (LRB):  CEIOL OS (Left)    Final Anesthesia Type: MAC      Patient location during evaluation: PACU  Patient participation: Yes- Able to Participate  Level of consciousness: awake and alert  Post-procedure vital signs: reviewed and stable  Pain management: adequate  Airway patency: patent    PONV status at discharge: No PONV  Anesthetic complications: no      Cardiovascular status: hemodynamically stable  Respiratory status: unassisted and room air  Hydration status: euvolemic  Follow-up not needed.              Vitals Value Taken Time   /85 07/30/25 11:02     07/30/25 11:35   Pulse 78 07/30/25 11:06   Resp 17 07/30/25 11:00   SpO2 99 % 07/30/25 11:06   Vitals shown include unfiled device data.      Event Time   Out of Recovery 11:20:00         Pain/Wilfredo Score: Wilfredo Score: 10 (7/30/2025 11:05 AM)

## 2025-07-31 ENCOUNTER — OFFICE VISIT (OUTPATIENT)
Dept: OPHTHALMOLOGY | Facility: CLINIC | Age: 85
End: 2025-07-31
Payer: MEDICARE

## 2025-07-31 VITALS
HEIGHT: 66 IN | TEMPERATURE: 98 F | OXYGEN SATURATION: 100 % | RESPIRATION RATE: 17 BRPM | SYSTOLIC BLOOD PRESSURE: 153 MMHG | DIASTOLIC BLOOD PRESSURE: 85 MMHG | BODY MASS INDEX: 33.03 KG/M2 | HEART RATE: 78 BPM | WEIGHT: 205.5 LBS

## 2025-07-31 DIAGNOSIS — Z98.42 STATUS POST CATARACT SURGERY, LEFT: Primary | ICD-10-CM

## 2025-07-31 PROCEDURE — 1159F MED LIST DOCD IN RCRD: CPT | Mod: CPTII,HCNC,S$GLB, | Performed by: OPHTHALMOLOGY

## 2025-07-31 PROCEDURE — 3288F FALL RISK ASSESSMENT DOCD: CPT | Mod: CPTII,HCNC,S$GLB, | Performed by: OPHTHALMOLOGY

## 2025-07-31 PROCEDURE — 99999 PR PBB SHADOW E&M-EST. PATIENT-LVL III: CPT | Mod: PBBFAC,HCNC,, | Performed by: OPHTHALMOLOGY

## 2025-07-31 PROCEDURE — 1126F AMNT PAIN NOTED NONE PRSNT: CPT | Mod: CPTII,HCNC,S$GLB, | Performed by: OPHTHALMOLOGY

## 2025-07-31 PROCEDURE — 1101F PT FALLS ASSESS-DOCD LE1/YR: CPT | Mod: CPTII,HCNC,S$GLB, | Performed by: OPHTHALMOLOGY

## 2025-07-31 PROCEDURE — 99024 POSTOP FOLLOW-UP VISIT: CPT | Mod: HCNC,S$GLB,, | Performed by: OPHTHALMOLOGY

## 2025-07-31 NOTE — PROGRESS NOTES
HPI    1 day s/p phaco IOL OS done on 7/30/2025    Pt states OS feels well. Denies pain/ FOL/ floaters.     Compliant with post op gtts. Using pred, moxi, and keto as directed.     Cosopt OU Q12H  Brimonidine OU Q12H   Travaprost  OU QHS     Last edited by Yenifer Haider on 7/31/2025  9:31 AM.            Assessment /Plan     For exam results, see Encounter Report.    Status post cataract surgery, left      Doing well  Post op precautions and instructions reviewed, sheet given  moxi QID  PF QID  Keto qdaily    Continue glaucoma drops unchanged    F/u 1 week

## 2025-08-07 ENCOUNTER — OFFICE VISIT (OUTPATIENT)
Dept: OPHTHALMOLOGY | Facility: CLINIC | Age: 85
End: 2025-08-07
Payer: MEDICARE

## 2025-08-07 DIAGNOSIS — Z98.42 STATUS POST CATARACT SURGERY, LEFT: Primary | ICD-10-CM

## 2025-08-07 PROCEDURE — 99999 PR PBB SHADOW E&M-EST. PATIENT-LVL III: CPT | Mod: PBBFAC,HCNC,, | Performed by: OPHTHALMOLOGY

## 2025-08-07 PROCEDURE — 1126F AMNT PAIN NOTED NONE PRSNT: CPT | Mod: CPTII,HCNC,S$GLB, | Performed by: OPHTHALMOLOGY

## 2025-08-07 PROCEDURE — 1101F PT FALLS ASSESS-DOCD LE1/YR: CPT | Mod: CPTII,HCNC,S$GLB, | Performed by: OPHTHALMOLOGY

## 2025-08-07 PROCEDURE — 1159F MED LIST DOCD IN RCRD: CPT | Mod: CPTII,HCNC,S$GLB, | Performed by: OPHTHALMOLOGY

## 2025-08-07 PROCEDURE — 3288F FALL RISK ASSESSMENT DOCD: CPT | Mod: CPTII,HCNC,S$GLB, | Performed by: OPHTHALMOLOGY

## 2025-08-07 PROCEDURE — 99024 POSTOP FOLLOW-UP VISIT: CPT | Mod: HCNC,S$GLB,, | Performed by: OPHTHALMOLOGY

## 2025-08-07 NOTE — PROGRESS NOTES
HPI        Cosopt OU Q12H  Brimonidine OU Q12H   Travaprost  OU QHS     Last edited by Yenifer Haider on 8/7/2025 10:18 AM.            Assessment /Plan     For exam results, see Encounter Report.    Status post cataract surgery, left      POW1 CEIOL  Doing well  D/c moxi  Continue PF QID with taper  Continue keto qdaily  Post op instructions reviewed    Continue glaucoma drops unchanged      F/u 1 month, refract PRN

## 2025-09-04 ENCOUNTER — OFFICE VISIT (OUTPATIENT)
Dept: OPHTHALMOLOGY | Facility: CLINIC | Age: 85
End: 2025-09-04
Payer: MEDICARE

## 2025-09-04 DIAGNOSIS — Z98.42 STATUS POST CATARACT SURGERY, LEFT: Primary | ICD-10-CM

## 2025-09-04 PROCEDURE — 3288F FALL RISK ASSESSMENT DOCD: CPT | Mod: CPTII,HCNC,S$GLB, | Performed by: OPHTHALMOLOGY

## 2025-09-04 PROCEDURE — 1159F MED LIST DOCD IN RCRD: CPT | Mod: CPTII,HCNC,S$GLB, | Performed by: OPHTHALMOLOGY

## 2025-09-04 PROCEDURE — 1101F PT FALLS ASSESS-DOCD LE1/YR: CPT | Mod: CPTII,HCNC,S$GLB, | Performed by: OPHTHALMOLOGY

## 2025-09-04 PROCEDURE — 1126F AMNT PAIN NOTED NONE PRSNT: CPT | Mod: CPTII,HCNC,S$GLB, | Performed by: OPHTHALMOLOGY

## 2025-09-04 PROCEDURE — 99999 PR PBB SHADOW E&M-EST. PATIENT-LVL III: CPT | Mod: PBBFAC,HCNC,, | Performed by: OPHTHALMOLOGY

## 2025-09-04 PROCEDURE — 99024 POSTOP FOLLOW-UP VISIT: CPT | Mod: HCNC,S$GLB,, | Performed by: OPHTHALMOLOGY

## (undated) DEVICE — TAPE SILICONE 1 X1 1/2

## (undated) DEVICE — SOL BETADINE 5%

## (undated) DEVICE — CYSTOTOME IRRIG 24G 13MM

## (undated) DEVICE — KIT POST CATARACT SURGERY

## (undated) DEVICE — GLOVE SENSICARE PI ALOE 7.5

## (undated) DEVICE — PACK CUSTOM EYE KIT